# Patient Record
Sex: MALE | Race: WHITE | Employment: OTHER | URBAN - METROPOLITAN AREA
[De-identification: names, ages, dates, MRNs, and addresses within clinical notes are randomized per-mention and may not be internally consistent; named-entity substitution may affect disease eponyms.]

---

## 2020-11-22 ENCOUNTER — APPOINTMENT (OUTPATIENT)
Dept: CT IMAGING | Age: 75
DRG: 291 | End: 2020-11-22
Payer: MEDICARE

## 2020-11-22 ENCOUNTER — APPOINTMENT (OUTPATIENT)
Dept: GENERAL RADIOLOGY | Age: 75
DRG: 291 | End: 2020-11-22
Payer: MEDICARE

## 2020-11-22 ENCOUNTER — HOSPITAL ENCOUNTER (INPATIENT)
Age: 75
LOS: 8 days | Discharge: NURSING, REHAB OR LONG-TERM CARE | DRG: 291 | End: 2020-11-30
Attending: EMERGENCY MEDICINE | Admitting: INTERNAL MEDICINE
Payer: MEDICARE

## 2020-11-22 PROBLEM — Z79.4 CONTROLLED TYPE 2 DIABETES MELLITUS, WITH LONG-TERM CURRENT USE OF INSULIN (HCC): Status: ACTIVE | Noted: 2020-11-22

## 2020-11-22 PROBLEM — J18.9 PNEUMONIA: Status: ACTIVE | Noted: 2020-11-22

## 2020-11-22 PROBLEM — J96.20 ACUTE ON CHRONIC RESPIRATORY FAILURE (HCC): Status: ACTIVE | Noted: 2020-11-22

## 2020-11-22 PROBLEM — J44.1 CHRONIC OBSTRUCTIVE PULMONARY DISEASE WITH ACUTE EXACERBATION (HCC): Status: ACTIVE | Noted: 2020-11-22

## 2020-11-22 PROBLEM — I89.0 LYMPHEDEMA: Status: ACTIVE | Noted: 2020-11-22
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
## 2020-11-22 PROBLEM — N50.89 SCROTAL EDEMA: Status: ACTIVE | Noted: 2020-11-22

## 2020-11-22 PROBLEM — R09.02 HYPOXIA: Status: ACTIVE | Noted: 2020-11-22

## 2020-11-22 PROBLEM — E11.9 CONTROLLED TYPE 2 DIABETES MELLITUS, WITH LONG-TERM CURRENT USE OF INSULIN (HCC): Status: ACTIVE | Noted: 2020-11-22

## 2020-11-22 LAB
ADENOVIRUS BY PCR: NOT DETECTED
ADENOVIRUS BY PCR: NOT DETECTED
ALBUMIN SERPL-MCNC: 3.6 G/DL (ref 3.5–5.2)
ALBUMIN SERPL-MCNC: 3.6 G/DL (ref 3.5–5.2)
ALP BLD-CCNC: 77 U/L (ref 40–129)
ALP BLD-CCNC: 78 U/L (ref 40–129)
ALT SERPL-CCNC: 34 U/L (ref 0–40)
ALT SERPL-CCNC: 37 U/L (ref 0–40)
ANION GAP SERPL CALCULATED.3IONS-SCNC: 7 MMOL/L (ref 7–16)
APTT: 33 SEC (ref 24.5–35.1)
AST SERPL-CCNC: 40 U/L (ref 0–39)
AST SERPL-CCNC: 46 U/L (ref 0–39)
B.E.: 5.5 MMOL/L (ref -3–3)
BACTERIA: ABNORMAL /HPF
BASOPHILS ABSOLUTE: 0 E9/L (ref 0–0.2)
BASOPHILS ABSOLUTE: 0.03 E9/L (ref 0–0.2)
BASOPHILS RELATIVE PERCENT: 0 % (ref 0–2)
BASOPHILS RELATIVE PERCENT: 0.4 % (ref 0–2)
BILIRUB SERPL-MCNC: 0.2 MG/DL (ref 0–1.2)
BILIRUB SERPL-MCNC: 0.3 MG/DL (ref 0–1.2)
BILIRUBIN DIRECT: <0.2 MG/DL (ref 0–0.3)
BILIRUBIN URINE: ABNORMAL
BILIRUBIN, INDIRECT: ABNORMAL MG/DL (ref 0–1)
BLOOD, URINE: ABNORMAL
BORDETELLA PARAPERTUSSIS BY PCR: NOT DETECTED
BORDETELLA PARAPERTUSSIS BY PCR: NOT DETECTED
BORDETELLA PERTUSSIS BY PCR: NOT DETECTED
BORDETELLA PERTUSSIS BY PCR: NOT DETECTED
BUN BLDV-MCNC: 10 MG/DL (ref 8–23)
BUN BLDV-MCNC: 10 MG/DL (ref 8–23)
BURR CELLS: ABNORMAL
C-REACTIVE PROTEIN: 2.8 MG/DL (ref 0–0.4)
CALCIUM SERPL-MCNC: 9.2 MG/DL (ref 8.6–10.2)
CHLAMYDOPHILIA PNEUMONIAE BY PCR: NOT DETECTED
CHLAMYDOPHILIA PNEUMONIAE BY PCR: NOT DETECTED
CHLORIDE BLD-SCNC: 95 MMOL/L (ref 98–107)
CLARITY: CLEAR
CO2: 36 MMOL/L (ref 22–29)
COHB: 1.5 % (ref 0–1.5)
COLOR: YELLOW
COMMENT: ABNORMAL
CORONAVIRUS 229E BY PCR: NOT DETECTED
CORONAVIRUS 229E BY PCR: NOT DETECTED
CORONAVIRUS HKU1 BY PCR: NOT DETECTED
CORONAVIRUS HKU1 BY PCR: NOT DETECTED
CORONAVIRUS NL63 BY PCR: NOT DETECTED
CORONAVIRUS NL63 BY PCR: NOT DETECTED
CORONAVIRUS OC43 BY PCR: NOT DETECTED
CORONAVIRUS OC43 BY PCR: NOT DETECTED
CREAT SERPL-MCNC: 0.9 MG/DL (ref 0.7–1.2)
CREAT SERPL-MCNC: 0.9 MG/DL (ref 0.7–1.2)
CRITICAL: ABNORMAL
D DIMER: 315 NG/ML DDU
DATE ANALYZED: ABNORMAL
DATE OF COLLECTION: ABNORMAL
EKG ATRIAL RATE: 92 BPM
EKG P AXIS: 72 DEGREES
EKG P-R INTERVAL: 204 MS
EKG Q-T INTERVAL: 372 MS
EKG QRS DURATION: 76 MS
EKG QTC CALCULATION (BAZETT): 460 MS
EKG R AXIS: 2 DEGREES
EKG T AXIS: 66 DEGREES
EKG VENTRICULAR RATE: 92 BPM
EOSINOPHILS ABSOLUTE: 0 E9/L (ref 0.05–0.5)
EOSINOPHILS ABSOLUTE: 0.01 E9/L (ref 0.05–0.5)
EOSINOPHILS RELATIVE PERCENT: 0 % (ref 0–6)
EOSINOPHILS RELATIVE PERCENT: 0.1 % (ref 0–6)
FIBRINOGEN: 641 MG/DL (ref 225–540)
GFR AFRICAN AMERICAN: >60
GFR AFRICAN AMERICAN: >60
GFR NON-AFRICAN AMERICAN: >60 ML/MIN/1.73
GFR NON-AFRICAN AMERICAN: >60 ML/MIN/1.73
GLUCOSE BLD-MCNC: 212 MG/DL (ref 74–99)
GLUCOSE URINE: NEGATIVE MG/DL
HBA1C MFR BLD: 8.9 % (ref 4–5.6)
HCO3: 36 MMOL/L (ref 22–26)
HCT VFR BLD CALC: 42.9 % (ref 37–54)
HCT VFR BLD CALC: 47.8 % (ref 37–54)
HEMOGLOBIN: 12.5 G/DL (ref 12.5–16.5)
HEMOGLOBIN: 13.9 G/DL (ref 12.5–16.5)
HHB: 8.5 % (ref 0–5)
HUMAN METAPNEUMOVIRUS BY PCR: NOT DETECTED
HUMAN METAPNEUMOVIRUS BY PCR: NOT DETECTED
HUMAN RHINOVIRUS/ENTEROVIRUS BY PCR: NOT DETECTED
HUMAN RHINOVIRUS/ENTEROVIRUS BY PCR: NOT DETECTED
HYALINE CASTS: ABNORMAL /LPF (ref 0–2)
HYPOCHROMIA: ABNORMAL
IMMATURE GRANULOCYTES #: 0.09 E9/L
IMMATURE GRANULOCYTES %: 1.1 % (ref 0–5)
INFLUENZA A BY PCR: NOT DETECTED
INFLUENZA A BY PCR: NOT DETECTED
INFLUENZA B BY PCR: NOT DETECTED
INFLUENZA B BY PCR: NOT DETECTED
INR BLD: 1.1
KETONES, URINE: ABNORMAL MG/DL
LAB: ABNORMAL
LACTATE DEHYDROGENASE: 282 U/L (ref 135–225)
LEUKOCYTE ESTERASE, URINE: NEGATIVE
LYMPHOCYTES ABSOLUTE: 0.27 E9/L (ref 1.5–4)
LYMPHOCYTES ABSOLUTE: 0.7 E9/L (ref 1.5–4)
LYMPHOCYTES RELATIVE PERCENT: 3.5 % (ref 20–42)
LYMPHOCYTES RELATIVE PERCENT: 8.9 % (ref 20–42)
Lab: ABNORMAL
MCH RBC QN AUTO: 25.7 PG (ref 26–35)
MCH RBC QN AUTO: 25.9 PG (ref 26–35)
MCHC RBC AUTO-ENTMCNC: 29.1 % (ref 32–34.5)
MCHC RBC AUTO-ENTMCNC: 29.1 % (ref 32–34.5)
MCV RBC AUTO: 88.3 FL (ref 80–99.9)
MCV RBC AUTO: 89.2 FL (ref 80–99.9)
METER GLUCOSE: 232 MG/DL (ref 74–99)
METER GLUCOSE: 232 MG/DL (ref 74–99)
METHB: 0.3 % (ref 0–1.5)
MODE: ABNORMAL
MONOCYTES ABSOLUTE: 0 E9/L (ref 0.1–0.95)
MONOCYTES ABSOLUTE: 0.74 E9/L (ref 0.1–0.95)
MONOCYTES RELATIVE PERCENT: 0 % (ref 2–12)
MONOCYTES RELATIVE PERCENT: 9.4 % (ref 2–12)
MYCOPLASMA PNEUMONIAE BY PCR: NOT DETECTED
MYCOPLASMA PNEUMONIAE BY PCR: NOT DETECTED
NEUTROPHILS ABSOLUTE: 6.33 E9/L (ref 1.8–7.3)
NEUTROPHILS ABSOLUTE: 6.5 E9/L (ref 1.8–7.3)
NEUTROPHILS RELATIVE PERCENT: 80.1 % (ref 43–80)
NEUTROPHILS RELATIVE PERCENT: 96.5 % (ref 43–80)
NITRITE, URINE: NEGATIVE
NUCLEATED RED BLOOD CELLS: 0 /100 WBC
O2 CONTENT: 17.7 ML/DL
O2 SATURATION: 91.3 % (ref 92–98.5)
O2HB: 89.7 % (ref 94–97)
OPERATOR ID: 1190
OVALOCYTES: ABNORMAL
PARAINFLUENZA VIRUS 1 BY PCR: NOT DETECTED
PARAINFLUENZA VIRUS 1 BY PCR: NOT DETECTED
PARAINFLUENZA VIRUS 2 BY PCR: NOT DETECTED
PARAINFLUENZA VIRUS 2 BY PCR: NOT DETECTED
PARAINFLUENZA VIRUS 3 BY PCR: NOT DETECTED
PARAINFLUENZA VIRUS 3 BY PCR: NOT DETECTED
PARAINFLUENZA VIRUS 4 BY PCR: NOT DETECTED
PARAINFLUENZA VIRUS 4 BY PCR: NOT DETECTED
PATIENT TEMP: 37 C
PCO2: 85.8 MMHG (ref 35–45)
PDW BLD-RTO: 15.1 FL (ref 11.5–15)
PDW BLD-RTO: 15.1 FL (ref 11.5–15)
PH BLOOD GAS: 7.24 (ref 7.35–7.45)
PH UA: 5.5 (ref 5–9)
PLATELET # BLD: 191 E9/L (ref 130–450)
PLATELET # BLD: 224 E9/L (ref 130–450)
PMV BLD AUTO: 11.9 FL (ref 7–12)
PMV BLD AUTO: 12.5 FL (ref 7–12)
PO2: 70.4 MMHG (ref 75–100)
POIKILOCYTES: ABNORMAL
POLYCHROMASIA: ABNORMAL
POTASSIUM REFLEX MAGNESIUM: 4.6 MMOL/L (ref 3.5–5)
PRO-BNP: 1973 PG/ML (ref 0–450)
PROSTATE SPECIFIC ANTIGEN: 0.27 NG/ML (ref 0–4)
PROTEIN UA: >=300 MG/DL
PROTHROMBIN TIME: 12.1 SEC (ref 9.3–12.4)
RBC # BLD: 4.86 E12/L (ref 3.8–5.8)
RBC # BLD: 5.36 E12/L (ref 3.8–5.8)
RBC UA: ABNORMAL /HPF (ref 0–2)
RESPIRATORY SYNCYTIAL VIRUS BY PCR: NOT DETECTED
RESPIRATORY SYNCYTIAL VIRUS BY PCR: NOT DETECTED
SARS-COV-2, PCR: NOT DETECTED
SARS-COV-2, PCR: NOT DETECTED
SEDIMENTATION RATE, ERYTHROCYTE: 19 MM/HR (ref 0–15)
SODIUM BLD-SCNC: 138 MMOL/L (ref 132–146)
SOURCE, BLOOD GAS: ABNORMAL
SPECIFIC GRAVITY UA: >=1.03 (ref 1–1.03)
THB: 14 G/DL (ref 11.5–16.5)
TIME ANALYZED: 1607
TOTAL CK: 713 U/L (ref 20–200)
TOTAL PROTEIN: 7.2 G/DL (ref 6.4–8.3)
TOTAL PROTEIN: 7.4 G/DL (ref 6.4–8.3)
TROPONIN: 0.03 NG/ML (ref 0–0.03)
UROBILINOGEN, URINE: 0.2 E.U./DL
WBC # BLD: 6.7 E9/L (ref 4.5–11.5)
WBC # BLD: 7.9 E9/L (ref 4.5–11.5)
WBC UA: ABNORMAL /HPF (ref 0–5)

## 2020-11-22 PROCEDURE — 36415 COLL VENOUS BLD VENIPUNCTURE: CPT

## 2020-11-22 PROCEDURE — 94640 AIRWAY INHALATION TREATMENT: CPT

## 2020-11-22 PROCEDURE — 82550 ASSAY OF CK (CPK): CPT

## 2020-11-22 PROCEDURE — 85610 PROTHROMBIN TIME: CPT

## 2020-11-22 PROCEDURE — 6370000000 HC RX 637 (ALT 250 FOR IP): Performed by: STUDENT IN AN ORGANIZED HEALTH CARE EDUCATION/TRAINING PROGRAM

## 2020-11-22 PROCEDURE — 86140 C-REACTIVE PROTEIN: CPT

## 2020-11-22 PROCEDURE — 73521 X-RAY EXAM HIPS BI 2 VIEWS: CPT

## 2020-11-22 PROCEDURE — 70450 CT HEAD/BRAIN W/O DYE: CPT

## 2020-11-22 PROCEDURE — G0103 PSA SCREENING: HCPCS

## 2020-11-22 PROCEDURE — 71045 X-RAY EXAM CHEST 1 VIEW: CPT

## 2020-11-22 PROCEDURE — 87088 URINE BACTERIA CULTURE: CPT

## 2020-11-22 PROCEDURE — 85730 THROMBOPLASTIN TIME PARTIAL: CPT

## 2020-11-22 PROCEDURE — 85025 COMPLETE CBC W/AUTO DIFF WBC: CPT

## 2020-11-22 PROCEDURE — 93010 ELECTROCARDIOGRAM REPORT: CPT | Performed by: INTERNAL MEDICINE

## 2020-11-22 PROCEDURE — 51702 INSERT TEMP BLADDER CATH: CPT

## 2020-11-22 PROCEDURE — APPSS45 APP SPLIT SHARED TIME 31-45 MINUTES: Performed by: CLINICAL NURSE SPECIALIST

## 2020-11-22 PROCEDURE — 85384 FIBRINOGEN ACTIVITY: CPT

## 2020-11-22 PROCEDURE — 94660 CPAP INITIATION&MGMT: CPT

## 2020-11-22 PROCEDURE — 81001 URINALYSIS AUTO W/SCOPE: CPT

## 2020-11-22 PROCEDURE — 99284 EMERGENCY DEPT VISIT MOD MDM: CPT

## 2020-11-22 PROCEDURE — 6370000000 HC RX 637 (ALT 250 FOR IP): Performed by: CLINICAL NURSE SPECIALIST

## 2020-11-22 PROCEDURE — 84520 ASSAY OF UREA NITROGEN: CPT

## 2020-11-22 PROCEDURE — 94664 DEMO&/EVAL PT USE INHALER: CPT

## 2020-11-22 PROCEDURE — 84145 PROCALCITONIN (PCT): CPT

## 2020-11-22 PROCEDURE — 82805 BLOOD GASES W/O2 SATURATION: CPT

## 2020-11-22 PROCEDURE — 83880 ASSAY OF NATRIURETIC PEPTIDE: CPT

## 2020-11-22 PROCEDURE — 83036 HEMOGLOBIN GLYCOSYLATED A1C: CPT

## 2020-11-22 PROCEDURE — 87040 BLOOD CULTURE FOR BACTERIA: CPT

## 2020-11-22 PROCEDURE — 80076 HEPATIC FUNCTION PANEL: CPT

## 2020-11-22 PROCEDURE — 2580000003 HC RX 258: Performed by: STUDENT IN AN ORGANIZED HEALTH CARE EDUCATION/TRAINING PROGRAM

## 2020-11-22 PROCEDURE — 0202U NFCT DS 22 TRGT SARS-COV-2: CPT

## 2020-11-22 PROCEDURE — 99223 1ST HOSP IP/OBS HIGH 75: CPT | Performed by: INTERNAL MEDICINE

## 2020-11-22 PROCEDURE — 83520 IMMUNOASSAY QUANT NOS NONAB: CPT

## 2020-11-22 PROCEDURE — 85378 FIBRIN DEGRADE SEMIQUANT: CPT

## 2020-11-22 PROCEDURE — 83615 LACTATE (LD) (LDH) ENZYME: CPT

## 2020-11-22 PROCEDURE — 93005 ELECTROCARDIOGRAM TRACING: CPT | Performed by: STUDENT IN AN ORGANIZED HEALTH CARE EDUCATION/TRAINING PROGRAM

## 2020-11-22 PROCEDURE — 85651 RBC SED RATE NONAUTOMATED: CPT

## 2020-11-22 PROCEDURE — 2500000003 HC RX 250 WO HCPCS: Performed by: CLINICAL NURSE SPECIALIST

## 2020-11-22 PROCEDURE — 6360000002 HC RX W HCPCS: Performed by: STUDENT IN AN ORGANIZED HEALTH CARE EDUCATION/TRAINING PROGRAM

## 2020-11-22 PROCEDURE — 6360000002 HC RX W HCPCS: Performed by: CLINICAL NURSE SPECIALIST

## 2020-11-22 PROCEDURE — 80053 COMPREHEN METABOLIC PANEL: CPT

## 2020-11-22 PROCEDURE — 82565 ASSAY OF CREATININE: CPT

## 2020-11-22 PROCEDURE — 84484 ASSAY OF TROPONIN QUANT: CPT

## 2020-11-22 PROCEDURE — 2060000000 HC ICU INTERMEDIATE R&B

## 2020-11-22 PROCEDURE — 72125 CT NECK SPINE W/O DYE: CPT

## 2020-11-22 PROCEDURE — 82962 GLUCOSE BLOOD TEST: CPT

## 2020-11-22 RX ORDER — DEXTROSE MONOHYDRATE 25 G/50ML
12.5 INJECTION, SOLUTION INTRAVENOUS PRN
Status: DISCONTINUED | OUTPATIENT
Start: 2020-11-22 | End: 2020-11-30 | Stop reason: HOSPADM

## 2020-11-22 RX ORDER — DEXTROSE MONOHYDRATE 50 MG/ML
100 INJECTION, SOLUTION INTRAVENOUS PRN
Status: DISCONTINUED | OUTPATIENT
Start: 2020-11-22 | End: 2020-11-22 | Stop reason: SDUPTHER

## 2020-11-22 RX ORDER — NICOTINE POLACRILEX 4 MG
15 LOZENGE BUCCAL PRN
Status: DISCONTINUED | OUTPATIENT
Start: 2020-11-22 | End: 2020-11-22 | Stop reason: SDUPTHER

## 2020-11-22 RX ORDER — NICOTINE POLACRILEX 4 MG
15 LOZENGE BUCCAL PRN
Status: DISCONTINUED | OUTPATIENT
Start: 2020-11-22 | End: 2020-11-30 | Stop reason: HOSPADM

## 2020-11-22 RX ORDER — FLUCONAZOLE 2 MG/ML
100 INJECTION, SOLUTION INTRAVENOUS EVERY 24 HOURS
Status: DISCONTINUED | OUTPATIENT
Start: 2020-11-22 | End: 2020-11-30 | Stop reason: HOSPADM

## 2020-11-22 RX ORDER — IPRATROPIUM BROMIDE AND ALBUTEROL SULFATE 2.5; .5 MG/3ML; MG/3ML
1 SOLUTION RESPIRATORY (INHALATION)
Status: COMPLETED | OUTPATIENT
Start: 2020-11-22 | End: 2020-11-22

## 2020-11-22 RX ORDER — LOVASTATIN 40 MG/1
40 TABLET ORAL NIGHTLY
COMMUNITY

## 2020-11-22 RX ORDER — METHYLPREDNISOLONE SODIUM SUCCINATE 125 MG/2ML
125 INJECTION, POWDER, LYOPHILIZED, FOR SOLUTION INTRAMUSCULAR; INTRAVENOUS ONCE
Status: COMPLETED | OUTPATIENT
Start: 2020-11-22 | End: 2020-11-22

## 2020-11-22 RX ORDER — DONEPEZIL HYDROCHLORIDE 5 MG/1
10 TABLET, FILM COATED ORAL NIGHTLY
Status: DISCONTINUED | OUTPATIENT
Start: 2020-11-22 | End: 2020-11-30 | Stop reason: HOSPADM

## 2020-11-22 RX ORDER — ATORVASTATIN CALCIUM 10 MG/1
10 TABLET, FILM COATED ORAL DAILY
Status: DISCONTINUED | OUTPATIENT
Start: 2020-11-22 | End: 2020-11-26

## 2020-11-22 RX ORDER — BENAZEPRIL HYDROCHLORIDE 40 MG/1
40 TABLET, FILM COATED ORAL DAILY
COMMUNITY
End: 2021-01-07 | Stop reason: ALTCHOICE

## 2020-11-22 RX ORDER — AMLODIPINE BESYLATE 5 MG/1
5 TABLET ORAL DAILY
Status: DISCONTINUED | OUTPATIENT
Start: 2020-11-22 | End: 2020-11-25

## 2020-11-22 RX ORDER — DEXTROSE MONOHYDRATE 50 MG/ML
100 INJECTION, SOLUTION INTRAVENOUS PRN
Status: DISCONTINUED | OUTPATIENT
Start: 2020-11-22 | End: 2020-11-30 | Stop reason: HOSPADM

## 2020-11-22 RX ORDER — DEXAMETHASONE SODIUM PHOSPHATE 4 MG/ML
6 INJECTION, SOLUTION INTRA-ARTICULAR; INTRALESIONAL; INTRAMUSCULAR; INTRAVENOUS; SOFT TISSUE EVERY 12 HOURS
Status: DISCONTINUED | OUTPATIENT
Start: 2020-11-22 | End: 2020-11-24

## 2020-11-22 RX ORDER — ESCITALOPRAM OXALATE 10 MG/1
10 TABLET ORAL DAILY
COMMUNITY

## 2020-11-22 RX ORDER — DONEPEZIL HYDROCHLORIDE 10 MG/1
10 TABLET, FILM COATED ORAL NIGHTLY
COMMUNITY

## 2020-11-22 RX ORDER — FUROSEMIDE 10 MG/ML
20 INJECTION INTRAMUSCULAR; INTRAVENOUS ONCE
Status: COMPLETED | OUTPATIENT
Start: 2020-11-22 | End: 2020-11-22

## 2020-11-22 RX ORDER — DEXTROSE MONOHYDRATE 25 G/50ML
12.5 INJECTION, SOLUTION INTRAVENOUS PRN
Status: DISCONTINUED | OUTPATIENT
Start: 2020-11-22 | End: 2020-11-22 | Stop reason: SDUPTHER

## 2020-11-22 RX ORDER — AMLODIPINE BESYLATE 5 MG/1
5 TABLET ORAL DAILY
COMMUNITY
End: 2021-01-07 | Stop reason: ALTCHOICE

## 2020-11-22 RX ORDER — GLIMEPIRIDE 2 MG/1
2 TABLET ORAL
Status: DISCONTINUED | OUTPATIENT
Start: 2020-11-23 | End: 2020-11-22

## 2020-11-22 RX ORDER — DEXAMETHASONE SODIUM PHOSPHATE 4 MG/ML
6 INJECTION, SOLUTION INTRA-ARTICULAR; INTRALESIONAL; INTRAMUSCULAR; INTRAVENOUS; SOFT TISSUE EVERY 24 HOURS
Status: DISCONTINUED | OUTPATIENT
Start: 2020-11-23 | End: 2020-11-22

## 2020-11-22 RX ORDER — ESCITALOPRAM OXALATE 10 MG/1
10 TABLET ORAL DAILY
Status: DISCONTINUED | OUTPATIENT
Start: 2020-11-22 | End: 2020-11-30 | Stop reason: HOSPADM

## 2020-11-22 RX ORDER — GLIMEPIRIDE 2 MG/1
2 TABLET ORAL 2 TIMES DAILY
COMMUNITY

## 2020-11-22 RX ORDER — LISINOPRIL 20 MG/1
40 TABLET ORAL DAILY
Status: DISCONTINUED | OUTPATIENT
Start: 2020-11-22 | End: 2020-11-30 | Stop reason: HOSPADM

## 2020-11-22 RX ADMIN — METFORMIN HYDROCHLORIDE 500 MG: 500 TABLET ORAL at 15:29

## 2020-11-22 RX ADMIN — NYSTATIN 500000 UNITS: 100000 SUSPENSION ORAL at 15:53

## 2020-11-22 RX ADMIN — MICONAZOLE NITRATE: 20 POWDER TOPICAL at 21:29

## 2020-11-22 RX ADMIN — ESCITALOPRAM OXALATE 10 MG: 10 TABLET ORAL at 15:29

## 2020-11-22 RX ADMIN — LISINOPRIL 40 MG: 20 TABLET ORAL at 15:29

## 2020-11-22 RX ADMIN — AMLODIPINE BESYLATE 5 MG: 5 TABLET ORAL at 15:29

## 2020-11-22 RX ADMIN — METHYLPREDNISOLONE SODIUM SUCCINATE 125 MG: 125 INJECTION, POWDER, FOR SOLUTION INTRAMUSCULAR; INTRAVENOUS at 10:31

## 2020-11-22 RX ADMIN — Medication 100 MG: at 15:54

## 2020-11-22 RX ADMIN — NYSTATIN 500000 UNITS: 100000 SUSPENSION ORAL at 15:29

## 2020-11-22 RX ADMIN — IPRATROPIUM BROMIDE AND ALBUTEROL SULFATE 1 AMPULE: 2.5; .5 SOLUTION RESPIRATORY (INHALATION) at 09:01

## 2020-11-22 RX ADMIN — IPRATROPIUM BROMIDE AND ALBUTEROL SULFATE 1 AMPULE: 2.5; .5 SOLUTION RESPIRATORY (INHALATION) at 09:00

## 2020-11-22 RX ADMIN — IPRATROPIUM BROMIDE AND ALBUTEROL SULFATE 1 AMPULE: 2.5; .5 SOLUTION RESPIRATORY (INHALATION) at 09:02

## 2020-11-22 RX ADMIN — CEFTRIAXONE 2 G: 2 INJECTION, POWDER, FOR SOLUTION INTRAMUSCULAR; INTRAVENOUS at 11:12

## 2020-11-22 RX ADMIN — ATORVASTATIN CALCIUM 10 MG: 10 TABLET, FILM COATED ORAL at 15:29

## 2020-11-22 RX ADMIN — FUROSEMIDE 20 MG: 10 INJECTION, SOLUTION INTRAVENOUS at 15:53

## 2020-11-22 RX ADMIN — MICONAZOLE NITRATE: 20 POWDER TOPICAL at 15:29

## 2020-11-22 RX ADMIN — AZITHROMYCIN MONOHYDRATE 500 MG: 500 INJECTION, POWDER, LYOPHILIZED, FOR SOLUTION INTRAVENOUS at 11:16

## 2020-11-22 ASSESSMENT — PAIN SCALES - GENERAL
PAINLEVEL_OUTOF10: 0
PAINLEVEL_OUTOF10: 0

## 2020-11-22 ASSESSMENT — PAIN SCALES - PAIN ASSESSMENT IN ADVANCED DEMENTIA (PAINAD)
FACIALEXPRESSION: 0
NEGVOCALIZATION: 0
BREATHING: 0
TOTALSCORE: 0
CONSOLABILITY: 0
BODYLANGUAGE: 0

## 2020-11-22 NOTE — PROGRESS NOTES
Imaging reviewed. Repeat respiratory panel and CT chest wo contrast.    Continue Azithromycin and Ceftriaxone. We will await further lab work and imaging. Case discussed with Dr. Lance Cruz.     Rowe Habermann  11/22/2020  6:13 PM

## 2020-11-22 NOTE — ED PROVIDER NOTES
The patient is a 73-year-old male with a history of COPD chronically on 3 to 4 L nasal cannula oxygen, lymphedema, chronic wounds of his bilateral lower extremities, dementia, and diabetes who presents to the emergency department via EMS after experiencing a fall at home. Patient is a poor historian, but he states that he was sitting in bed watching television today when he slipped out and fell. He states that he laid on the ground for approximately 1 hour before his wife called EMS. The patient denies any pain or injuries at this time. He denies any fever, chills, headache, chest pain, shortness of breath, abdominal pain, lightheadedness, dizziness, syncope, recent hospitalization, recent illness, or other acute symptoms or concerns. Patient states that he does live at home with his wife and typically walks on his own, but he has been experiencing increased weakness and has been unable to ambulate. He states that he does not have a walker at home or use any other assistance tools to walk typically. The history is provided by the patient. Review of Systems   Unable to perform ROS: Dementia   Constitutional: Positive for fatigue. Neurological: Positive for weakness. All other systems reviewed and are negative. Physical Exam  Vitals signs and nursing note reviewed. Constitutional:       General: He is not in acute distress. Appearance: He is well-developed. He is morbidly obese. He is ill-appearing (Currently ill-appearing but no acute distress). He is not toxic-appearing or diaphoretic. Interventions: Nasal cannula in place. Comments: No conversational dyspnea. Audible wheezing. HENT:      Head: Normocephalic and atraumatic. Nose: Nose normal.      Mouth/Throat:      Lips: Pink. No lesions. Mouth: Mucous membranes are moist.   Eyes:      General: Lids are normal.   Neck:      Musculoskeletal: Normal range of motion and neck supple.    Cardiovascular: Rate and Rhythm: Normal rate and regular rhythm. Heart sounds: Normal heart sounds, S1 normal and S2 normal. No murmur. Comments: Pitting edema of the bilateral lower extremities with chronic wounds and vascular venous stasis changes present bilaterally. There is a small amount of bleeding from the left anterior tibial region. Pulmonary:      Effort: Pulmonary effort is normal. No tachypnea or respiratory distress. Breath sounds: Decreased breath sounds (Diminished breath sounds at the bilateral bases), wheezing (Inspiratory and expiratory wheezing bilaterally) and rhonchi (Scattered coarse breath sounds throughout) present. No rales. Abdominal:      General: Abdomen is protuberant. Bowel sounds are normal.      Palpations: Abdomen is soft. Tenderness: There is no abdominal tenderness. There is no guarding or rebound. Genitourinary:     Comments: Scrotal edema  Musculoskeletal:      Right lower le+ Edema present. Left lower le+ Edema present. Skin:     General: Skin is warm and dry. Neurological:      Mental Status: He is alert and oriented to person, place, and time. Motor: No tremor or abnormal muscle tone. Coordination: Coordination normal.          Procedures     MDM  Number of Diagnoses or Management Options  Elevated brain natriuretic peptide (BNP) level:   Elevated CK: Fall, initial encounter:   Hyperglycemia:   Pneumonia due to organism:   Scrotal edema:   Unable to ambulate:   Diagnosis management comments: The patient is a 73-year-old male who presents to the emergency department complaining of a fall. He is hemodynamically stable, nontoxic, chronically ill in appearance, audible wheezing, but in no acute distress.   Urinalysis shows a large amount of blood but no evidence of acute infection, no leukocytosis, no anemia, no electrolyte abnormalities, glucose elevated at 212, AST slightly elevated at 40, CK was elevated at 713, no evidence of acute kidney injury, BNP 1973, troponin 0.03, CT head did not show acute intracranial hemorrhage or other abnormalities, CT cervical spine did not show any acute abnormalities, and chest x-ray shows multifocal bilateral patchy pulmonary infiltrates. X-ray of the bilateral hips and pelvis did not show any fracture or dislocation. Treated patient with a dose of Solu-Medrol and duo nebs due to the audible wheezing initially. Blood cultures were sent and the patient was also treated with empiric antibiotics including azithromycin and Rocephin to cover for community-acquired pneumonia. X-ray also consistent with patchy bilateral infiltrates which may be secondary to Covid. Respiratory panel was sent and is pending. Consulted with hospitalist who accepted the patient for admission. Discussed results and plan of care with patient he verbalized understanding and agreement to treatment plan and admission. ED Course as of Nov 22 1402   Sun Nov 22, 2020   1025 Consulted with Dr. Jah Mcknight, hospitalist, who accepted the patient for admission. [KG]      ED Course User Index  [KG] Dione Bass DO        EKG: This EKG is signed and interpreted by me. Rate: 92  Rhythm: Sinus  Interpretation: Normal sinus rhythm, normal axis, nonspecific ST changes anterior septal leads, intervals within normal limits, QTC is 460  Comparison: no previous EKG available     ED Course as of Nov 22 1402   Sun Nov 22, 2020 1025 Consulted with Dr. Jah Mcknight, hospitalist, who accepted the patient for admission. [KG]      ED Course User Index  [KG] Dione Bass DO       --------------------------------------------- PAST HISTORY ---------------------------------------------  Past Medical History:  has a past medical history of COPD (chronic obstructive pulmonary disease) ([facility] [address] Dementia ([facility] and Diabetes mellitus ([facility] Past Surgical History:  has no past surgical history on file.     Social History:  reports that he has quit smoking. He has never used smokeless tobacco. He reports previous alcohol use. He reports that he does not use drugs. Family History: family history is not on file. The patients home medications have been reviewed. Allergies: Patient has no known allergies.     -------------------------------------------------- RESULTS -------------------------------------------------    LABS:  Results for orders placed or performed during the hospital encounter of 11/22/20   CBC auto differential   Result Value Ref Range    WBC 7.9 4.5 - 11.5 E9/L    RBC 4.86 3.80 - 5.80 E12/L    Hemoglobin 12.5 12.5 - 16.5 g/dL    Hematocrit 42.9 37.0 - 54.0 %    MCV 88.3 80.0 - 99.9 fL    MCH 25.7 (L) 26.0 - 35.0 pg    MCHC 29.1 (L) 32.0 - 34.5 %    RDW 15.1 (H) 11.5 - 15.0 fL    Platelets 725 951 - 483 E9/L    MPV 11.9 7.0 - 12.0 fL    Neutrophils % 80.1 (H) 43.0 - 80.0 %    Immature Granulocytes % 1.1 0.0 - 5.0 %    Lymphocytes % 8.9 (L) 20.0 - 42.0 %    Monocytes % 9.4 2.0 - 12.0 %    Eosinophils % 0.1 0.0 - 6.0 %    Basophils % 0.4 0.0 - 2.0 %    Neutrophils Absolute 6.33 1.80 - 7.30 E9/L    Immature Granulocytes # 0.09 E9/L    Lymphocytes Absolute 0.70 (L) 1.50 - 4.00 E9/L    Monocytes Absolute 0.74 0.10 - 0.95 E9/L    Eosinophils Absolute 0.01 (L) 0.05 - 0.50 E9/L    Basophils Absolute 0.03 0.00 - 0.20 E9/L   Comprehensive Metabolic Panel w/ Reflex to MG   Result Value Ref Range    Sodium 138 132 - 146 mmol/L    Potassium reflex Magnesium 4.6 3.5 - 5.0 mmol/L    Chloride 95 (L) 98 - 107 mmol/L    CO2 36 (H) 22 - 29 mmol/L    Anion Gap 7 7 - 16 mmol/L    Glucose 212 (H) 74 - 99 mg/dL    BUN 10 8 - 23 mg/dL    CREATININE 0.9 0.7 - 1.2 mg/dL    GFR Non-African American >60 >=60 mL/min/1.73    GFR African American >60     Calcium 9.2 8.6 - 10.2 mg/dL    Total Protein 7.2 6.4 - 8.3 g/dL    Alb 3.6 3.5 - 5.2 g/dL    Total Bilirubin 0.3 0.0 - 1.2 mg/dL    Alkaline Phosphatase 77 40 - 129 U/L    ALT 34 0 - 40 U/L    AST 40 (H) 0 - 39 U/L   CK   Result Value Ref Range    Total  (H) 20 - 200 U/L   Brain Natriuretic Peptide   Result Value Ref Range    Pro-BNP 1,973 (H) 0 - 450 pg/mL   Troponin   Result Value Ref Range    Troponin 0.03 0.00 - 0.03 ng/mL   Urinalysis with Microscopic   Result Value Ref Range    Color, UA Yellow Straw/Yellow    Clarity, UA Clear Clear    Glucose, Ur Negative Negative mg/dL    Bilirubin Urine SMALL (A) Negative    Ketones, Urine TRACE (A) Negative mg/dL    Specific Gravity, UA >=1.030 1.005 - 1.030    Blood, Urine LARGE (A) Negative    pH, UA 5.5 5.0 - 9.0    Protein, UA >=300 (A) Negative mg/dL    Urobilinogen, Urine 0.2 <2.0 E.U./dL    Nitrite, Urine Negative Negative    Leukocyte Esterase, Urine Negative Negative    Hyaline Casts, UA 0-2 0 - 2 /LPF    WBC, UA 0-1 0 - 5 /HPF    RBC, UA 1-3 0 - 2 /HPF    Bacteria, UA RARE (A) None Seen /HPF   EKG 12 Lead   Result Value Ref Range    Ventricular Rate 92 BPM    Atrial Rate 92 BPM    P-R Interval 204 ms    QRS Duration 76 ms    Q-T Interval 372 ms    QTc Calculation (Bazett) 460 ms    P Axis 72 degrees    R Axis 2 degrees    T Axis 66 degrees       RADIOLOGY:  CT HEAD WO CONTRAST   Final Result   1. There is no acute intracranial abnormality. Specifically, there is no   intracranial hemorrhage. 2. Atrophy and periventricular leukomalacia,      CT CERVICAL SPINE WO CONTRAST   Final Result   No acute osseous abnormality of the cervical spine. XR CHEST PORTABLE   Final Result   Multifocal bilateral patchy pulmonary infiltrates. XR HIP BILATERAL W AP PELVIS (2 VIEWS)   Final Result   1. There is no right or left hip fracture dislocation   2. Mild degenerative changes of the hips.        ------------------------- NURSING NOTES AND VITALS REVIEWED ---------------------------  Date / Time Roomed:  11/22/2020  8:26 AM  ED Bed Assignment:  [id number]    The nursing notes within the ED encounter and vital signs as below have been reviewed.      Patient

## 2020-11-22 NOTE — ED NOTES
Bed: 20  Expected date:   Expected time:   Means of arrival:   Comments:  ems     Keith Nunn RN  11/22/20 [id number]

## 2020-11-22 NOTE — PROGRESS NOTES
Date: 11/22/2020    Time: 4:28 PM    Patient Placed On BIPAP/CPAP/ Non-Invasive Ventilation? Yes    If no must comment. Facial area red/color change? No           If YES are Blister/Lesion present? No   If yes must notify nursing staff  BIPAP/CPAP skin barrier?   Yes    Skin barrier type:mepilexlite       Comments:        Kar Colbert          11/22/20 6547   NIV Type   $NIV $Daily Charge   Skin Assessment Clean, dry, & intact   Skin Protection for O2 Device Yes   NIV Started/Stopped On   Equipment Type V60   Mode Bilevel   Mask Type Full face mask   Mask Size Large   Settings/Measurements   IPAP 18 cmH20   CPAP/EPAP 8 cmH2O   Rate Ordered 16   Resp 16   FiO2  50 %   Vt Exhaled 600 mL   Minute Volume 9.8 Liters   Mask Leak (lpm) 53 lpm   Comfort Level Good   Using Accessory Muscles No   SpO2 95

## 2020-11-22 NOTE — H&P
[facility] History and Physical      CHIEF COMPLAINT: Fall at home, unresponsiveness, ongoing weakness shortness of breath in the setting of COPD. History of Present Illness: This is a 52-year-old  man with a past medical history of COPD, venous stasis, lymphedema, chronic wounds, dementia, morbid obesity, chronic hypoxia, history of alcohol and nicotine use, JACEK. Admitted in from the ER today coming in via EMS services apparently the patient was at home resting in the chair watching TV he had been found by his wife on the floor after a fall I am unclear if he actually lost consciousness or fell asleep obtunded. He is got a history of COPD that is oxygen dependent about 3 L follows up at local pulmonary's. As I examined the patient in the ER he is obtunded mainly awake for just a few seconds and falling back asleep. I spoke with the ER provider and the nurse about the above-mentioned story. I did place 2 calls out to the wife but did not make contact just yet. Upon arrival to the ER as above, CT the spine and CT the head unremarkable for acute findings bilateral hip x-rays unremarkable for acute pathology does show degenerative changes. Chest x-ray concerning for bilateral lower infiltrates and groundglass. Patient had blood culture sent and a COVID-19 array sent. BNP shows 1972 CK total 713. He was given azithromycin, ceftriaxone, Solu-Medrol 125 mg and a DuoNeb treatment. He is currently on 6 L nasal cannula oxygen.    -spoke to wife about him over the last few weeks to months -she brought me up to speed on some of the listed comorbidities he had below and the issues brought in today when she had to call 911. She gave the ER nurse the bed list and they are trying to get it corrected at this time.     Informant(s) for H&P: Medical chart, RN present    REVIEW OF SYSTEMS:  A comprehensive review of systems was negative except for: what is in the HPI  -An otherwise very limited review of systems due to the patient's obtunded this and sleepiness    PMH:  Past Medical History:   Diagnosis Date    COPD (chronic obstructive pulmonary disease) ([facility])     Dementia ([facility] [address] [facility]     Diabetes mellitus ([facility]       · Dementia  · Diabetes type 2  · COPD  · History of alcohol tobacco use  · JACEK  · Lymphedema  · Stasis  · Left lower leg wounds    Surgical History:  History reviewed. No pertinent surgical history. · Lower extremity wound debridement    Medications Prior to Admission:    Prior to Admission medications    Not on File       Allergies:    Patient has no known allergies. Social History:    reports that he has quit smoking. He has never used smokeless tobacco. He reports previous alcohol use. He reports that he does not use drugs. Patient is  with 2 children he is a former smoker quit many years ago he only smoked for about 10 years according to his wife. Former alcohol use but quit many years ago during a stressful job he is retired from he was still and retired from [employer] as a supervisor. Family History:   family history is not on file. Mother- around age [de-identified] complications of diabetes  Father- around age [de-identified] with pneumonia and sepsis      PHYSICAL EXAM:  Vitals:  BP (!) 145/92   Pulse 85   Temp 96.7 °F (35.9 °C) (Axillary)   Resp 20   Ht 5' 11\" (1.803 m)   Wt (!) 318 lb 6 oz (144.4 kg)   SpO2 96%   BMI 44.40 kg/m²     General Appearance: Obtunded, awakes briefly for tactile stimulation verbal stimulation then falls right back to sleep. Skin: warm and dry  -Excoriated perigluteal folds abdominal folds.   Cutaneous candidiasis  Head: normocephalic and atraumatic  Eyes: pupils equal, round, and reactive to light, extraocular eye movements intact, conjunctivae normal  Neck: neck supple and non tender without mass   Pulmonary/Chest: clear to auscultation bilaterally- no wheezes, rales or rhonchi, normal air movement, no respiratory distress. 2 L nasal cannula with poor air exchange  Cardiovascular: normal rate, normal S1 and S2 and no carotid bruits  Abdomen: Distended, normal bowel sounds, no masses or organomegaly  -Penis and scrotum 4+ edema with Simms catheter concentrated yellow urine. Penis and scrotum with erythema  Extremities: no cyanosis, no clubbing 4+ lymphedema bilateral lower extremities   -Left second toe amputated. Lower extremities with scattered venous stasis discoloration, scattered superficial venous stasis ulcerations with cracked fissured skin and weeping  -Right second toe metatarsal head 2 cm circular ulceration. Venous stasis discoloration along with fissured venous stasis ulcerations throughout the lower remedy. Neurologic: no cranial nerve deficit and speech normal  Hep-Lock      LABS:  Recent Labs     20  0904      K 4.6   CL 95*   CO2 36*   BUN 10   CREATININE 0.9   GLUCOSE 212*   CALCIUM 9.2       Recent Labs     20  0904   WBC 7.9   RBC 4.86   HGB 12.5   HCT 42.9   MCV 88.3   MCH 25.7*   MCHC 29.1*   RDW 15.1*      MPV 11.9       No results for input(s): POCGLU in the last 72 hours. CK total 713  BNP 1973  Blood culture sent  COVID-19 PCR sent    Radiology:   CT HEAD WO CONTRAST   Final Result   1. There is no acute intracranial abnormality. Specifically, there is no   intracranial hemorrhage. 2. Atrophy and periventricular leukomalacia,      CT CERVICAL SPINE WO CONTRAST   Final Result   No acute osseous abnormality of the cervical spine. XR CHEST PORTABLE   Final Result   Multifocal bilateral patchy pulmonary infiltrates. XR HIP BILATERAL W AP PELVIS (2 VIEWS)   Final Result   1. There is no right or left hip fracture dislocation   2. Mild degenerative changes of the hips.           EK2020 10:03 AM - Miles, Mhy Incoming Ekg Results From [facility]     Component  Value  Ref Range & Units  Status  Collected  Lab    Ventricular Rate  92  BPM  Incomplete 11/22/2020  9:28 AM  [facility]    Atrial Rate  92  BPM  Incomplete  11/22/2020  9:28 AM  [facility]    P-R Interval  204  ms  Incomplete  11/22/2020  9:28 AM  [facility]    QRS Duration  76  ms  Incomplete  11/22/2020  9:28 AM  [facility]    Q-T Interval  372  ms  Incomplete  11/22/2020  9:28 AM  [facility]    QTc Calculation (Bazett)  460  ms  Incomplete  11/22/2020  9:28 AM  [facility]    P Axis  72  degrees  Incomplete  11/22/2020  9:28 AM  [facility]    R Axis  2  degrees  Incomplete  11/22/2020  9:28 AM  [facility]    T Axis  66  degrees  Incomplete  11/22/2020  9:28 AM  [facility]    Testing Performed By     Lab - Abbreviation  Name  Director  Address  Valid Date Range    360-[facility]  [facility]  Unknown  Unknown  04/18/16 0721-Present    Narrative & Impression     Sinus rhythm with premature atrial complexes  Septal infarct , age undetermined  Abnormal ECG  No previous ECGs available         ASSESSMENT:      Active Problems:    Pneumonia    Chronic obstructive pulmonary disease with acute exacerbation (HCC)    Controlled type 2 diabetes mellitus, with long-term current use of insulin (HCC)    Hypoxia    Scrotal edema    Lymphedema    Acute on chronic respiratory failure ([facility])  Resolved Problems:    * No resolved hospital problems. *      PLAN:    1. Acute on chronic hypoxic respiratory failure -history of COPD  -Comes in EMS services with questionable loss of consciousness, fall on the floor while watching TV that was reported.   -Chronic hypoxia  -Differential included COVID-19, community-acquired pneumonia, aspiration pneumonia, viral pneumonitis, fluid volume overload, COPD exacerbation      -Chest x-ray with groundglass and bilateral infiltrates  -Already received azithromycin and ceftriaxone  -Received IV steroids  -DuoNeb  -COVID-19 protocol labs sent  -Consult pulmonary          Fall -apparently was at home sitting in the chair watching TV he had fallen to sleep was noted to been found by his wife on the floor questionable loss of consciousness versus up tenderness and fall. Elevated CK total 713 secondary to above  -          COPD with probable COPD exacerbation  -Consult pulmonary  -ABGs  -Continue IV steroids        Concern for community-acquired pneumonia versus viral pneumonia  -X-ray with multifocal bilateral patchy pulmonary infiltrates with patchy groundglass infiltrates. -ceftriaxone and azithromycin in the ER  -Placed on 3 to 6 L nasal cannula  -      Bilateral lower extremity venous stasis, lymphedema and chronic wounds  -Epic chart shows he has seen local podiatry Dr. Yasmine Howard  -Consult wound care      Fluid volume overload  -Patient BNP 1973  -I will give a one-time dose Lasix for now        Dementia - ongoing 7 + yrs  Had formal neuro opt per wife  -Continue Aricept          Morbid obesity  - diet and liefestyle      Diabetes type 2  Continue home regimen of Metformin and glimepiride      Scrotal edema  -Concerning for scrotal cellulitis, severe lymphedema  -Catheter was placed  -Send UA, cultures        Edwar, gluteal, abdominal excoriation and candidiasis  -Supportive care, nystatin powder  -Start Diflucan            Likely  underlying urine retention   Concern for urinary tract infection  -Send UA, CS        HTN  - home reg Norvasc, lisinopril        Hyperlipidemia  -Home statin        Spoke to wife Nolvia Blum on issues as above best I could   -COVID-19 labs discussed with attending physician here on the admitting team  -Admission med reconciliation with no medications I have the floor call me once ER places         Code Status: Full code---wife wants    DVT prophylaxis: Start Lovenox  Diet low sod, diabatic   Will need SW for placement           NOTE: This report was transcribed using voice recognition software. Every effort was made to ensure accuracy; however, inadvertent computerized transcription errors may be present.   Electronically signed by IKE Carrasco on 11/22/2020 at 12:44 PM

## 2020-11-22 NOTE — PLAN OF CARE
Problem: Falls - Risk of:  Goal: Will remain free from falls  Description: Will remain free from falls  Outcome: Met This Shift     Problem: Skin Integrity:  Goal: Will show no infection signs and symptoms  Description: Will show no infection signs and symptoms  Outcome: Met This Shift     Problem: Injury - Risk of, Physical Injury:  Goal: Will remain free from falls  Description: Will remain free from falls  Outcome: Met This Shift     Problem: Cardiac:  Goal: Hemodynamic stability will improve  Description: Hemodynamic stability will improve  Outcome: Met This Shift     Problem: Airway Clearance - Ineffective:  Goal: Ability to maintain a clear airway will improve  Description: Ability to maintain a clear airway will improve  Outcome: Met This Shift     Problem: Confusion - Acute:  Goal: Absence of continued neurological deterioration signs and symptoms  Description: Absence of continued neurological deterioration signs and symptoms  Outcome: Ongoing

## 2020-11-23 ENCOUNTER — APPOINTMENT (OUTPATIENT)
Dept: CT IMAGING | Age: 75
DRG: 291 | End: 2020-11-23
Payer: MEDICARE

## 2020-11-23 LAB
ALBUMIN SERPL-MCNC: 3.2 G/DL (ref 3.5–5.2)
ALP BLD-CCNC: 68 U/L (ref 40–129)
ALT SERPL-CCNC: 22 U/L (ref 0–40)
ANION GAP SERPL CALCULATED.3IONS-SCNC: 7 MMOL/L (ref 7–16)
AST SERPL-CCNC: 28 U/L (ref 0–39)
BASOPHILIC STIPPLING: ABNORMAL
BASOPHILS ABSOLUTE: 0 E9/L (ref 0–0.2)
BASOPHILS RELATIVE PERCENT: 0 % (ref 0–2)
BILIRUB SERPL-MCNC: 0.3 MG/DL (ref 0–1.2)
BUN BLDV-MCNC: 13 MG/DL (ref 8–23)
CALCIUM SERPL-MCNC: 8.6 MG/DL (ref 8.6–10.2)
CHLORIDE BLD-SCNC: 94 MMOL/L (ref 98–107)
CK MB: 16 NG/ML (ref 0–7.7)
CK MB: 19.6 NG/ML (ref 0–7.7)
CO2: 38 MMOL/L (ref 22–29)
CREAT SERPL-MCNC: 0.9 MG/DL (ref 0.7–1.2)
EOSINOPHILS ABSOLUTE: 0.01 E9/L (ref 0.05–0.5)
EOSINOPHILS RELATIVE PERCENT: 0.2 % (ref 0–6)
GFR AFRICAN AMERICAN: >60
GFR NON-AFRICAN AMERICAN: >60 ML/MIN/1.73
GLUCOSE BLD-MCNC: 270 MG/DL (ref 74–99)
HBA1C MFR BLD: 8.9 % (ref 4–5.6)
HCT VFR BLD CALC: 41.1 % (ref 37–54)
HEMOGLOBIN: 11.8 G/DL (ref 12.5–16.5)
IMMATURE GRANULOCYTES #: 0.05 E9/L
IMMATURE GRANULOCYTES %: 1 % (ref 0–5)
LYMPHOCYTES ABSOLUTE: 0.33 E9/L (ref 1.5–4)
LYMPHOCYTES RELATIVE PERCENT: 6.5 % (ref 20–42)
MAGNESIUM: 2.1 MG/DL (ref 1.6–2.6)
MCH RBC QN AUTO: 25.5 PG (ref 26–35)
MCHC RBC AUTO-ENTMCNC: 28.7 % (ref 32–34.5)
MCV RBC AUTO: 88.8 FL (ref 80–99.9)
METER GLUCOSE: 223 MG/DL (ref 74–99)
METER GLUCOSE: 241 MG/DL (ref 74–99)
METER GLUCOSE: 246 MG/DL (ref 74–99)
MONOCYTES ABSOLUTE: 0.13 E9/L (ref 0.1–0.95)
MONOCYTES RELATIVE PERCENT: 2.6 % (ref 2–12)
NEUTROPHILS ABSOLUTE: 4.56 E9/L (ref 1.8–7.3)
NEUTROPHILS RELATIVE PERCENT: 89.7 % (ref 43–80)
PDW BLD-RTO: 15.1 FL (ref 11.5–15)
PHOSPHORUS: 5.4 MG/DL (ref 2.5–4.5)
PLATELET # BLD: 204 E9/L (ref 130–450)
PMV BLD AUTO: 11.9 FL (ref 7–12)
POTASSIUM SERPL-SCNC: 5 MMOL/L (ref 3.5–5)
POTASSIUM SERPL-SCNC: 5.3 MMOL/L (ref 3.5–5)
RBC # BLD: 4.63 E12/L (ref 3.8–5.8)
RBC # BLD: NORMAL 10*6/UL
SODIUM BLD-SCNC: 139 MMOL/L (ref 132–146)
TOTAL CK: 428 U/L (ref 20–200)
TOTAL CK: 484 U/L (ref 20–200)
TOTAL PROTEIN: 6.4 G/DL (ref 6.4–8.3)
TROPONIN: 0.02 NG/ML (ref 0–0.03)
TROPONIN: 0.02 NG/ML (ref 0–0.03)
WBC # BLD: 5.1 E9/L (ref 4.5–11.5)

## 2020-11-23 PROCEDURE — 87186 SC STD MICRODIL/AGAR DIL: CPT

## 2020-11-23 PROCEDURE — 6360000002 HC RX W HCPCS: Performed by: CLINICAL NURSE SPECIALIST

## 2020-11-23 PROCEDURE — 71275 CT ANGIOGRAPHY CHEST: CPT

## 2020-11-23 PROCEDURE — 82550 ASSAY OF CK (CPK): CPT

## 2020-11-23 PROCEDURE — 2700000000 HC OXYGEN THERAPY PER DAY

## 2020-11-23 PROCEDURE — 94660 CPAP INITIATION&MGMT: CPT

## 2020-11-23 PROCEDURE — 83036 HEMOGLOBIN GLYCOSYLATED A1C: CPT

## 2020-11-23 PROCEDURE — 84100 ASSAY OF PHOSPHORUS: CPT

## 2020-11-23 PROCEDURE — 85025 COMPLETE CBC W/AUTO DIFF WBC: CPT

## 2020-11-23 PROCEDURE — 6370000000 HC RX 637 (ALT 250 FOR IP): Performed by: INTERNAL MEDICINE

## 2020-11-23 PROCEDURE — 6370000000 HC RX 637 (ALT 250 FOR IP): Performed by: CLINICAL NURSE SPECIALIST

## 2020-11-23 PROCEDURE — 6360000002 HC RX W HCPCS: Performed by: INTERNAL MEDICINE

## 2020-11-23 PROCEDURE — APPSS30 APP SPLIT SHARED TIME 16-30 MINUTES: Performed by: NURSE PRACTITIONER

## 2020-11-23 PROCEDURE — 87070 CULTURE OTHR SPECIMN AEROBIC: CPT

## 2020-11-23 PROCEDURE — 84145 PROCALCITONIN (PCT): CPT

## 2020-11-23 PROCEDURE — 6360000004 HC RX CONTRAST MEDICATION: Performed by: RADIOLOGY

## 2020-11-23 PROCEDURE — 36415 COLL VENOUS BLD VENIPUNCTURE: CPT

## 2020-11-23 PROCEDURE — 82553 CREATINE MB FRACTION: CPT

## 2020-11-23 PROCEDURE — 99232 SBSQ HOSP IP/OBS MODERATE 35: CPT | Performed by: INTERNAL MEDICINE

## 2020-11-23 PROCEDURE — 2580000003 HC RX 258: Performed by: INTERNAL MEDICINE

## 2020-11-23 PROCEDURE — 2580000003 HC RX 258

## 2020-11-23 PROCEDURE — 80053 COMPREHEN METABOLIC PANEL: CPT

## 2020-11-23 PROCEDURE — 84132 ASSAY OF SERUM POTASSIUM: CPT

## 2020-11-23 PROCEDURE — 94640 AIRWAY INHALATION TREATMENT: CPT

## 2020-11-23 PROCEDURE — 84484 ASSAY OF TROPONIN QUANT: CPT

## 2020-11-23 PROCEDURE — 82962 GLUCOSE BLOOD TEST: CPT

## 2020-11-23 PROCEDURE — 2060000000 HC ICU INTERMEDIATE R&B

## 2020-11-23 PROCEDURE — 2500000003 HC RX 250 WO HCPCS: Performed by: CLINICAL NURSE SPECIALIST

## 2020-11-23 PROCEDURE — 83735 ASSAY OF MAGNESIUM: CPT

## 2020-11-23 RX ORDER — ARFORMOTEROL TARTRATE 15 UG/2ML
15 SOLUTION RESPIRATORY (INHALATION) 2 TIMES DAILY
Status: DISCONTINUED | OUTPATIENT
Start: 2020-11-23 | End: 2020-11-30 | Stop reason: HOSPADM

## 2020-11-23 RX ORDER — SODIUM CHLORIDE 0.9 % (FLUSH) 0.9 %
SYRINGE (ML) INJECTION
Status: COMPLETED
Start: 2020-11-23 | End: 2020-11-23

## 2020-11-23 RX ORDER — SODIUM CHLORIDE 0.9 % (FLUSH) 0.9 %
10 SYRINGE (ML) INJECTION 2 TIMES DAILY
Status: DISCONTINUED | OUTPATIENT
Start: 2020-11-23 | End: 2020-11-30 | Stop reason: HOSPADM

## 2020-11-23 RX ORDER — FLUTICASONE FUROATE AND VILANTEROL 100; 25 UG/1; UG/1
1 POWDER RESPIRATORY (INHALATION) DAILY
Status: DISCONTINUED | OUTPATIENT
Start: 2020-11-23 | End: 2020-11-23

## 2020-11-23 RX ORDER — SODIUM CHLORIDE 0.9 % (FLUSH) 0.9 %
10 SYRINGE (ML) INJECTION PRN
Status: DISCONTINUED | OUTPATIENT
Start: 2020-11-23 | End: 2020-11-30 | Stop reason: HOSPADM

## 2020-11-23 RX ORDER — BUDESONIDE 0.25 MG/2ML
250 INHALANT ORAL 2 TIMES DAILY
Status: DISCONTINUED | OUTPATIENT
Start: 2020-11-23 | End: 2020-11-30 | Stop reason: HOSPADM

## 2020-11-23 RX ORDER — FUROSEMIDE 10 MG/ML
20 INJECTION INTRAMUSCULAR; INTRAVENOUS DAILY
Status: DISCONTINUED | OUTPATIENT
Start: 2020-11-23 | End: 2020-11-26

## 2020-11-23 RX ADMIN — ESCITALOPRAM OXALATE 10 MG: 10 TABLET ORAL at 10:32

## 2020-11-23 RX ADMIN — DEXAMETHASONE SODIUM PHOSPHATE 6 MG: 4 INJECTION, SOLUTION INTRAMUSCULAR; INTRAVENOUS at 10:29

## 2020-11-23 RX ADMIN — INSULIN LISPRO 2 UNITS: 100 INJECTION, SOLUTION INTRAVENOUS; SUBCUTANEOUS at 17:07

## 2020-11-23 RX ADMIN — DONEPEZIL HYDROCHLORIDE 10 MG: 5 TABLET, FILM COATED ORAL at 21:24

## 2020-11-23 RX ADMIN — SODIUM CHLORIDE, PRESERVATIVE FREE 10 ML: 5 INJECTION INTRAVENOUS at 17:02

## 2020-11-23 RX ADMIN — AZITHROMYCIN 500 MG: 500 INJECTION, POWDER, LYOPHILIZED, FOR SOLUTION INTRAVENOUS at 10:28

## 2020-11-23 RX ADMIN — MICONAZOLE NITRATE: 20 POWDER TOPICAL at 21:30

## 2020-11-23 RX ADMIN — IOPAMIDOL 100 ML: 755 INJECTION, SOLUTION INTRAVENOUS at 14:04

## 2020-11-23 RX ADMIN — CEFTRIAXONE 1 G: 1 INJECTION, POWDER, FOR SOLUTION INTRAMUSCULAR; INTRAVENOUS at 10:27

## 2020-11-23 RX ADMIN — NYSTATIN 500000 UNITS: 100000 SUSPENSION ORAL at 21:24

## 2020-11-23 RX ADMIN — NYSTATIN 500000 UNITS: 100000 SUSPENSION ORAL at 17:02

## 2020-11-23 RX ADMIN — DEXAMETHASONE SODIUM PHOSPHATE 6 MG: 4 INJECTION, SOLUTION INTRAMUSCULAR; INTRAVENOUS at 00:08

## 2020-11-23 RX ADMIN — AMLODIPINE BESYLATE 5 MG: 5 TABLET ORAL at 10:32

## 2020-11-23 RX ADMIN — NYSTATIN 500000 UNITS: 100000 SUSPENSION ORAL at 10:32

## 2020-11-23 RX ADMIN — LISINOPRIL 40 MG: 20 TABLET ORAL at 10:32

## 2020-11-23 RX ADMIN — FUROSEMIDE 20 MG: 10 INJECTION, SOLUTION INTRAMUSCULAR; INTRAVENOUS at 17:01

## 2020-11-23 RX ADMIN — SODIUM CHLORIDE, PRESERVATIVE FREE 10 ML: 5 INJECTION INTRAVENOUS at 10:27

## 2020-11-23 RX ADMIN — INSULIN LISPRO 2 UNITS: 100 INJECTION, SOLUTION INTRAVENOUS; SUBCUTANEOUS at 12:10

## 2020-11-23 RX ADMIN — ARFORMOTEROL TARTRATE 15 MCG: 15 SOLUTION RESPIRATORY (INHALATION) at 21:47

## 2020-11-23 RX ADMIN — MICONAZOLE NITRATE: 20 POWDER TOPICAL at 10:33

## 2020-11-23 RX ADMIN — INSULIN LISPRO 1 UNITS: 100 INJECTION, SOLUTION INTRAVENOUS; SUBCUTANEOUS at 21:32

## 2020-11-23 RX ADMIN — DEXAMETHASONE SODIUM PHOSPHATE 6 MG: 4 INJECTION, SOLUTION INTRAMUSCULAR; INTRAVENOUS at 21:30

## 2020-11-23 RX ADMIN — BUDESONIDE 250 MCG: 0.25 SUSPENSION RESPIRATORY (INHALATION) at 21:47

## 2020-11-23 RX ADMIN — ATORVASTATIN CALCIUM 10 MG: 10 TABLET, FILM COATED ORAL at 10:32

## 2020-11-23 RX ADMIN — Medication 100 MG: at 11:42

## 2020-11-23 RX ADMIN — Medication 10 ML: at 21:25

## 2020-11-23 RX ADMIN — NYSTATIN 500000 UNITS: 100000 SUSPENSION ORAL at 12:15

## 2020-11-23 ASSESSMENT — PAIN SCALES - GENERAL
PAINLEVEL_OUTOF10: 0
PAINLEVEL_OUTOF10: 0

## 2020-11-23 ASSESSMENT — PAIN SCALES - PAIN ASSESSMENT IN ADVANCED DEMENTIA (PAINAD)
TOTALSCORE: 0
FACIALEXPRESSION: 0
CONSOLABILITY: 0
NEGVOCALIZATION: 0
BREATHING: 0
BODYLANGUAGE: 0

## 2020-11-23 NOTE — PROGRESS NOTES
[facility] Progress Note    Admitting Date and Time: 11/22/2020  8:26 AM  Admit Dx: Pneumonia [J18.9]  Pneumonia [J18.9]    Subjective:  Patient is being followed for Pneumonia [J18.9]  Pneumonia [J18.9]     Patient pleasantly confused  Denies complaints  On NC  On 4 L NC           ROS: denies fever, chills, cp, sob, n/v, HA unless stated above     sodium chloride flush  10 mL Intravenous BID    nystatin  5 mL Oral 4x Daily    miconazole   Topical BID    fluconazole  100 mg Intravenous Q24H    amLODIPine  5 mg Oral Daily    donepezil  10 mg Oral Nightly    lisinopril  40 mg Oral Daily    escitalopram  10 mg Oral Daily    atorvastatin  10 mg Oral Daily    dexamethasone  6 mg Intravenous Q12H    cefTRIAXone (ROCEPHIN) IV  1 g Intravenous Q24H    azithromycin  500 mg Intravenous Q24H    insulin lispro  0-6 Units Subcutaneous TID WC    insulin lispro  0-3 Units Subcutaneous Nightly     sodium chloride flush, 10 mL, PRN  iopamidol, 100 mL, ONCE PRN  glucose, 15 g, PRN  dextrose, 12.5 g, PRN  glucagon (rDNA), 1 mg, PRN  dextrose, 100 mL/hr, PRN         Objective:    /84   Pulse 86   Temp 98 °F (36.7 °C) (Oral)   Resp 20   Ht 5' 11\" (1.803 m)   Wt (!) 318 lb 6 oz (144.4 kg)   SpO2 96%   BMI 44.40 kg/m²     General Appearance: alert and oriented to person and place.  Confused to person  Skin: warm and dry  Head: normocephalic and atraumatic  Neck: neck supple and non tender without mass   Pulmonary/Chest: clear to auscultation bilaterally  Cardiovascular: normal rate, normal S1 and S2 and no carotid bruits  Abdomen: soft, non-tender, non-distended, normal bowel sounds  Extremities: no cyanosis, no clubbing and no edema  Neurologic: speech normal   Simms      Recent Labs     11/22/20  0904 11/22/20  1555 11/23/20  0600 11/23/20  1305     --  139  --    K 4.6  --  5.3* 5.0   CL 95*  --  94*  --    CO2 36*  --  38*  --    BUN 10 10 13  --    CREATININE 0.9 0.9 0.9  -- GLUCOSE 212*  --  270*  --    CALCIUM 9.2  --  8.6  --        Recent Labs     11/22/20  0904 11/22/20  1555 11/23/20  0600   WBC 7.9 6.7 5.1   RBC 4.86 5.36 4.63   HGB 12.5 13.9 11.8*   HCT 42.9 47.8 41.1   MCV 88.3 89.2 88.8   MCH 25.7* 25.9* 25.5*   MCHC 29.1* 29.1* 28.7*   RDW 15.1* 15.1* 15.1*    191 204   MPV 11.9 12.5* 11.9       Assessment:    Active Problems:    Pneumonia    Chronic obstructive pulmonary disease with acute exacerbation (HCC)    Controlled type 2 diabetes mellitus, with long-term current use of insulin ([facility])    Hypoxia    Scrotal edema    Lymphedema    Acute on chronic respiratory failure ([facility])    Fall    COPD exacerbation ([facility]    Uncontrolled type 2 diabetes mellitus with hyperglycemia ([facility] [facility]    Dementia without behavioral disturbance ([facility]  Resolved Problems:    * No resolved hospital problems. *      Plan:  1. Acute on chronic respiratory failure with hypoxia/ hypercapnia: pt came in by EMS with questionable LOC. Per wife pt fell on floor while watching TV. Pt wears 3-4 L at baseline. Noted to have hypercapnia on arrival. C02 85. Pt placed on biPAP. Currently on baseline 4 L NC.    2. Pneumonia bacterial vs viral pneumonia: cxr showing multifocal infiltrates. Concern initially for COVID-19. Viral panel x 2 negative. CTA chest ordered and pending. Pulmonology following- appreciate input. Check procal. Continue IV antibiotics for now. Continue IV azithromycin/ IV rocepin. Started on decadron. Continue droplet plus isolation for now. 3. S/p fall: apparently pt was sitting in chair watching TV- he had fallen asleep in chair and was noted to be on the floor. Questionable LOC vs fall. CT head/ neck with no acute findings. 4. Rhabdomyolysis- mild: ? To fall. Noted to be 713 on arrival- trend    5. COPD with probable COPD exacerbation; pulmonology consulted. Steroids/ nebs    6. Bilateral lower ext venous stasis, lymphedema and chronic wounds    7. Fluid overload: probnp 1973. Received lasix x1 on admission    8. Dementia: ongoing 7+ years. On aricept    9. Morbid obesity: per family pt is always wanting to ear     10. DM; continue meds. Check hg a1c    11. Scrotal edema: catheter placed    12. Candidiasis skin fold: pt was previously stated on diflucan    13. Possible UTI; UA / UC sent    14. HLd: statin    15. Metabolic encephalopathy: likely related to infection/ hypercarbia: pt awake/ alert. Oriented to person and place    16. Deconditioning: pt/ ot    17. Foot wounds; podiatry      NOTE: This report was transcribed using voice recognition software. Every effort was made to ensure accuracy; however, inadvertent computerized transcription errors may be present. Electronically signed by JENNIFER Berrios on 11/23/2020 at 1:55 PM  HOSPITALIST ATTENDING PHYSICIAN NOTE 11/23/2020 2323PM:    Details of the evaluation - subjective assessment (including medication profile, past medical, family and social history when applicable), examination, review of lab and test data, diagnostic impressions and medical decision making - performed by JENNIFER Berrios, were discussed with me on the date of service and I agree with clinical information herein unless otherwise noted. The patient has been evaluated by me personally earlier today. Pt reports no fevers, chills,n/v.     Exam: heart reg at rate of 88,lungs cta, abd pos bs soft nt, ext neg for le edema    I agree with the assessment and plan of JENNIFER Berrios. Acute respiratory failure with hypercapnia and hypoxia  Copd exacerbation  Metabolic encephaloapthy  Abnormal cxr pneumonia bacterial vs viral  Dm type 2 uncontrolled  dementia      Electronically signed by Jeneal Halsted, D.O.   Hospitalist  [facility]

## 2020-11-23 NOTE — PROGRESS NOTES
Date: 11/22/2020    Time: 10:46 PM    Patient Placed On BIPAP/CPAP/ Non-Invasive Ventilation? No    If no must comment. Facial area red/color change? No           If YES are Blister/Lesion present? No   If yes must notify nursing staff  BIPAP/CPAP skin barrier? Yes    Skin barrier type:mepilexlite       Comments: Pt remains on BiPAP at this time.         Foster Ely      11/22/20 4904   NIV Type   Mode Bilevel   Mask Type Full face mask   Mask Size Large   Settings/Measurements   IPAP 18 cmH20   CPAP/EPAP 8 cmH2O   Rate Ordered 16   Resp 16   FiO2  50 %   Vt Exhaled 540 mL   Minute Volume 8.8 Liters   Mask Leak (lpm) 73 lpm   Comfort Level Good   Using Accessory Muscles No   SpO2 99

## 2020-11-23 NOTE — PROGRESS NOTES
Per phone call with Dr Sotero Burgos, he is aware of negative COVID-19 test x2 and has reviewed today's CTA chest. Dr Sotero Burgos states that he believes the patient not to have COVID-19 and recommends moving patient to other inpatient unit. Lenard Ingram, hospitalist NP and [facility] informed.

## 2020-11-23 NOTE — CONSULTS
Department of Podiatry   Consult Note        Reason for Consult:  Pre-ulcerative lesion to right first digit    CHIEF COMPLAINT:  B/L LE edema and ulcer to right first digit     HISTORY OF PRESENT ILLNESS:                 is a 77year old male who was evaluated at bedside for a pre-ulcerative lesion to the right first digit. He has a past medical history of CHF, DM COPD, and dementia.  is a poor historian but believes he was watching television at home but when attempting to get out of bed, he fell. He waited for his wife to call 911 after which, he was transported to the hospital. Patient denies any N/V/D/F/C/SOB/CP and has no other pedal complaints at this time. Past Medical History:        Diagnosis Date    COPD (chronic obstructive pulmonary disease) ([facility]     Dementia ([facility]     Diabetes mellitus ([facility]    ·     Past Surgical History:    · History reviewed. No pertinent surgical history. Medications Prior to Admission:    · Medications Prior to Admission: metFORMIN (GLUCOPHAGE) 500 MG tablet, Take 500 mg by mouth 2 times daily (with meals)  · glimepiride (AMARYL) 2 MG tablet, Take 2 mg by mouth every morning (before breakfast)  · amLODIPine (NORVASC) 5 MG tablet, Take 5 mg by mouth daily  · donepezil (ARICEPT) 10 MG tablet, Take 10 mg by mouth nightly  · lovastatin (MEVACOR) 40 MG tablet, Take 40 mg by mouth nightly  · escitalopram (LEXAPRO) 10 MG tablet, Take 10 mg by mouth daily  · benazepril (LOTENSIN) 40 MG tablet, Take 40 mg by mouth daily    Allergies:  Patient has no known allergies. Social History:   · TOBACCO:   reports that he has quit smoking. He has never used smokeless tobacco.  · ETOH:   reports previous alcohol use. DRUGS:   Social History     Substance and Sexual Activity   Drug Use Never   ·     Family History:   · History reviewed. No pertinent family history.       REVIEW OF SYSTEMS:    All pertinent positives and negatives as stated in the HPI       LOWER EXTREMITY EXAMINATION     VASCULAR:  DP and PT pulses are  faint. CFT delayed B/L. Warm to warm from the tibial tuberosity to the distal aspect of the digits dorsally. No hair growth noted to the distal aspects dorsally. NEUROLOGIC:  Protective sensation is diminished b/l     DERM:  Small, pre-ulcerative lesion measuring about 0.5x0.5x0.2cm was noted to the plantar aspect of the first right digit. Hyperkeratotic skin noted surrounding the lesion. No noted tunneling, active bleeding or purulence expressed. Does not probe to bone. No noted edema or erythema surrounding the wound. Pitting edema noted diffusely to the LEs L>R. Hyperpigmentation noted to the LLE and ankle of RLE. Superficial abrasion with minor drainage noted         MUSCULOSKELETAL:  4/5 Gross Muscle strength in all 4 quadrants.  No posterior calf pain b/l        Wound Care:   Wound #1: right plantar first digit    Size - 0.5x0.5x0.2cm    Appearance - stable/ pre-ulcerative    Drainage - None   Odor - None        CONSULTS:  IP CONSULT TO PRIMARY CARE PROVIDER  IP CONSULT TO PULMONOLOGY  IP CONSULT TO IV TEAM  IP CONSULT TO PODIATRY  IP CONSULT TO SOCIAL WORK    MEDICATION:  Scheduled Meds:   sodium chloride flush  10 mL Intravenous BID    nystatin  5 mL Oral 4x Daily    miconazole   Topical BID    fluconazole  100 mg Intravenous Q24H    amLODIPine  5 mg Oral Daily    donepezil  10 mg Oral Nightly    lisinopril  40 mg Oral Daily    escitalopram  10 mg Oral Daily    atorvastatin  10 mg Oral Daily    dexamethasone  6 mg Intravenous Q12H    cefTRIAXone (ROCEPHIN) IV  1 g Intravenous Q24H    azithromycin  500 mg Intravenous Q24H    insulin lispro  0-6 Units Subcutaneous TID     insulin lispro  0-3 Units Subcutaneous Nightly     Continuous Infusions:   dextrose       PRN Meds:.sodium chloride flush **AND** sodium chloride flush, iopamidol, glucose, dextrose, glucagon (rDNA), dextrose    RADIOLOGY:  CT HEAD WO CONTRAST   Final Result 1. There is no acute intracranial abnormality. Specifically, there is no   intracranial hemorrhage. 2. Atrophy and periventricular leukomalacia,      CT CERVICAL SPINE WO CONTRAST   Final Result   No acute osseous abnormality of the cervical spine. XR CHEST PORTABLE   Final Result   Multifocal bilateral patchy pulmonary infiltrates. XR HIP BILATERAL W AP PELVIS (2 VIEWS)   Final Result   1. There is no right or left hip fracture dislocation   2. Mild degenerative changes of the hips. CTA CHEST W CONTRAST    (Results Pending)       Vitals:    /84   Pulse 86   Temp 98 °F (36.7 °C) (Oral)   Resp 20   Ht 5' 11\" (1.803 m)   Wt (!) 318 lb 6 oz (144.4 kg)   SpO2 96%   BMI 44.40 kg/m²     LABS:   Recent Labs     11/22/20  1555 11/23/20  0600   WBC 6.7 5.1   HGB 13.9 11.8*   HCT 47.8 41.1    204     Recent Labs     11/23/20  0600 11/23/20  1305     --    K 5.3* 5.0   CL 94*  --    CO2 38*  --    PHOS 5.4*  --    BUN 13  --    CREATININE 0.9  --      Recent Labs     11/22/20  1555 11/23/20  0600   PROT 7.4 6.4   INR 1.1  --    APTT 33.0  --        ASSESSMENTS:   Active Problems:  1. Venous stasis  2. Pre-ulcerative lesion to right first digit  3. Chronic edema  4. DM II  5. Lymphedema        Pneumonia    Chronic obstructive pulmonary disease with acute exacerbation (HCC)    Controlled type 2 diabetes mellitus, with long-term current use of insulin ([facility])    Hypoxia    Scrotal edema    Lymphedema    Acute on chronic respiratory failure ([address]    Fall    COPD exacerbation ([facility]    Uncontrolled type 2 diabetes mellitus with hyperglycemia ([facility] [address]    Dementia without behavioral disturbance (HCC)         PLAN:  - Patient's labs, charts and images were reviewed today   - Antibiotics: Azithromycin, Ceftriaxone, Fluconazole   - Cultures were obtained from left leg. Results pending  - Dressing was applied today: Adaptic, DSD, ACE b/l. Daily dressing changes.   - WBC- 5.1, SED RATE -19  - Discussed patient with Dr. Vianey Jose   - Will continue to follow patient while they are in-house. Thank you for the opportunity to take part in the patient's care. Please do not hesitate to call for any questions or concerns.

## 2020-11-23 NOTE — CARE COORDINATION
CASE MANAGEMENT. .. COVID NEG 11/22/2020. Chart reviewed. Patient with dementia. Called wife, Manisha Orantes [phone], to discuss hospital stay and discharge needs. Had an extensive conversation regarding patients worsening dementia, food addiction (50# wt gain), increased weakness. Mrs Justine Vargas states that she patient requires assistance with his ADL's. Voices having a step in shower. Denies having walker or cane, but does have transport wheelchair to use prn. She is interested in [facility] at discharge. Choices provided. PT/OT have been ordered. Await input. Currently requiring 4lnc. Mrs Justine Vargas states that patient wears 3lnc cont. Has concentrator and portable tank through [address] ([facility]). Confirmed with Crystal from [facility] [place] that order reads 2lnc cont. Nursing to wean as tolerated. Patient continues on iv zithromax, iv rocephin, iv decadron and iv diflucan. Will follow along with ss and assist with needs accordingly. The Plan for Transition of Care is related to the following treatment goals: DISCHARGE PLAN    The Patient and/or patient representative was provided with a choice of provider and agrees   with the discharge plan. [x] Yes [] No    Freedom of choice list was provided with basic dialogue that supports the patient's individualized plan of care/goals, treatment preferences and shares the quality data associated with the providers.  [x] Yes [] No

## 2020-11-23 NOTE — CONSULTS
Pulmonary Consultation    Admit Date: 11/22/2020  Requesting Physician: Susan Jimenez MD    CC:  Acute on chronic respiratory failure  HPI:  77years old obese male well-known to our practice due to history of severe COPD, morbid obesity, JACEK, and current respiratory failure 3 to 5 L of O2 and noncompliant with noninvasive ventilation. Present after episode of fall and loss of consciousness while at home. Patient was taken to ER by his family members and patient was found to have acute on chronic respiratory acidosis. The patient is  Unfortunately a very poor  Historian. Denies fever, chills, hemoptysis or chest discomfort    PMH:    Past Medical History:   Diagnosis Date    COPD (chronic obstructive pulmonary disease) (HCC)     Dementia (HCC)     Diabetes mellitus ([facility] [address]      PSH: History reviewed. No pertinent surgical history. Respiratory ROS: no cough, shortness of breath, or wheezing Otherwise, a complete review of systems is undertaken and is negative.     Social History:  · Alcohol:   Social drinker History of Alcohol abuse  · Tobacco: Former heavy smoker  · Employment: no silica or asbestos exposure  Medications:     dextrose        sodium chloride flush  10 mL Intravenous BID    nystatin  5 mL Oral 4x Daily    miconazole   Topical BID    fluconazole  100 mg Intravenous Q24H    amLODIPine  5 mg Oral Daily    donepezil  10 mg Oral Nightly    lisinopril  40 mg Oral Daily    escitalopram  10 mg Oral Daily    atorvastatin  10 mg Oral Daily    dexamethasone  6 mg Intravenous Q12H    cefTRIAXone (ROCEPHIN) IV  1 g Intravenous Q24H    azithromycin  500 mg Intravenous Q24H    insulin lispro  0-6 Units Subcutaneous TID WC    insulin lispro  0-3 Units Subcutaneous Nightly         Vitals:  Tmax:  VITALS:  /84   Pulse 86   Temp 98 °F (36.7 °C) (Oral)   Resp 20   Ht 5' 11\" (1.803 m)   Wt (!) 318 lb 6 oz (144.4 kg)   SpO2 96%   BMI 44.40 kg/m²   24HR INTAKE/OUTPUT:      Intake/Output Summary (Last 24 hours) at 2020 1534  Last data filed at 2020 1028  Gross per 24 hour   Intake 0 ml   Output 1325 ml   Net -1325 ml     CURRENT PULSE OXIMETRY:  SpO2: 96 %  24HR PULSE OXIMETRY RANGE:  SpO2  Av %  Min: 96 %  Max: 99 %        EXAM:  General: No distress. Alert. Morbid obese  Eyes: PERRL. No sclera icterus. No conjunctival injection. ENT: No discharge. Pharynx clear. Neck: Trachea midline. Normal thyroid. Resp: No accessory muscle use. No crackles. No wheezing. No rhonchi. Decreased breath sounds at bases  CV: Regular rate. Regular rhythm. No mumur or rub. ABD: Non-tender. Non-distended. No masses. No organmegaly. Normal bowel sounds. Skin: Warm and dry. No nodule on exposed extremities. No rash on exposed extremities. Surgical dressing both legs  Lymph: No cervical LAD. No supraclavicular LAD. Ext: No joint deformity. No clubbing. No cyanosis. Lymphedema  Neuro: Awake. Follows commands. Positive pupils/gag/corneals. Normal pain response. Lab Results:  CBC:   Recent Labs     20  0904 20  1555 20  0600   WBC 7.9 6.7 5.1   HGB 12.5 13.9 11.8*   HCT 42.9 47.8 41.1   MCV 88.3 89.2 88.8    191 204     BMP:   Recent Labs     20  0904 20  1555 20  0600 20  1305     --  139  --    K 4.6  --  5.3* 5.0   CL 95*  --  94*  --    CO2 36*  --  38*  --    PHOS  --   --  5.4*  --    BUN 10 10 13  --    CREATININE 0.9 0.9 0.9  --       ALB:3,BILIDIR:3,BILITOT:3,ALKPHOS:3)@  PT/INR:   Recent Labs     20  1555   PROTIME 12.1   INR 1.1     Cultures:  -  Respiratory panel negative  ABG: noted  Films:  CXR : Questionable congestion, body habitus may affect view. CT scan of the chest with contrast: Did not show pulmonary embolism. Showed bilateral mild congestion with groundglass. Left upper lobe groundglass infiltrate. Pleural base left upper aspect of the chest opacity. Bilateral small pleural effusions.   No old films to

## 2020-11-23 NOTE — PROGRESS NOTES
Date: 11/22/2020    Time: 8:51 PM    Patient Placed On BIPAP/CPAP/ Non-Invasive Ventilation? No    If no must comment. Facial area red/color change? No           If YES are Blister/Lesion present? No   If yes must notify nursing staff  BIPAP/CPAP skin barrier? Yes    Skin barrier type:mepilexlite       Comments: Pt remains on BiPAP at this time, mepielx in place.         Pepito Valerio      11/22/20 2050   NIV Type   Mode Bilevel   Mask Type Full face mask   Mask Size Large   Settings/Measurements   IPAP 18 cmH20   CPAP/EPAP 8 cmH2O   Rate Ordered 16   Resp 18   FiO2  50 %   Vt Exhaled 545 mL   Minute Volume 10.8 Liters   Mask Leak (lpm) 66 lpm   Comfort Level Good   Using Accessory Muscles No

## 2020-11-24 LAB
ALBUMIN SERPL-MCNC: 3.1 G/DL (ref 3.5–5.2)
ALP BLD-CCNC: 63 U/L (ref 40–129)
ALT SERPL-CCNC: 25 U/L (ref 0–40)
ANION GAP SERPL CALCULATED.3IONS-SCNC: 5 MMOL/L (ref 7–16)
AST SERPL-CCNC: 21 U/L (ref 0–39)
B.E.: 10.6 MMOL/L (ref -3–0)
BASOPHILS ABSOLUTE: 0 E9/L (ref 0–0.2)
BASOPHILS RELATIVE PERCENT: 0 % (ref 0–2)
BILIRUB SERPL-MCNC: 0.2 MG/DL (ref 0–1.2)
BUN BLDV-MCNC: 24 MG/DL (ref 8–23)
CALCIUM SERPL-MCNC: 8.7 MG/DL (ref 8.6–10.2)
CHLORIDE BLD-SCNC: 94 MMOL/L (ref 98–107)
CO2: 37 MMOL/L (ref 22–29)
CREAT SERPL-MCNC: 0.9 MG/DL (ref 0.7–1.2)
CRITICAL NOTIFICATION: YES
DELIVERY SYSTEMS: ABNORMAL
DEVICE: ABNORMAL
EOSINOPHILS ABSOLUTE: 0.01 E9/L (ref 0.05–0.5)
EOSINOPHILS RELATIVE PERCENT: 0.1 % (ref 0–6)
FIO2 ARTERIAL: 3
GFR AFRICAN AMERICAN: >60
GFR NON-AFRICAN AMERICAN: >60 ML/MIN/1.73
GLUCOSE BLD-MCNC: 361 MG/DL (ref 74–99)
HCO3 ARTERIAL: 38.7 MMOL/L (ref 22–26)
HCT VFR BLD CALC: 38.1 % (ref 37–54)
HEMOGLOBIN: 11.3 G/DL (ref 12.5–16.5)
IMMATURE GRANULOCYTES #: 0.07 E9/L
IMMATURE GRANULOCYTES %: 0.9 % (ref 0–5)
LV EF: 38 %
LVEF MODALITY: NORMAL
LYMPHOCYTES ABSOLUTE: 0.4 E9/L (ref 1.5–4)
LYMPHOCYTES RELATIVE PERCENT: 5 % (ref 20–42)
MCH RBC QN AUTO: 25.7 PG (ref 26–35)
MCHC RBC AUTO-ENTMCNC: 29.7 % (ref 32–34.5)
MCV RBC AUTO: 86.6 FL (ref 80–99.9)
METER GLUCOSE: 284 MG/DL (ref 74–99)
METER GLUCOSE: 289 MG/DL (ref 74–99)
METER GLUCOSE: 300 MG/DL (ref 74–99)
METER GLUCOSE: 314 MG/DL (ref 74–99)
MONOCYTES ABSOLUTE: 0.22 E9/L (ref 0.1–0.95)
MONOCYTES RELATIVE PERCENT: 2.7 % (ref 2–12)
NEUTROPHILS ABSOLUTE: 7.38 E9/L (ref 1.8–7.3)
NEUTROPHILS RELATIVE PERCENT: 91.3 % (ref 43–80)
O2 SATURATION: 90.1 % (ref 92–98.5)
OPERATOR ID: 1067
OVALOCYTES: ABNORMAL
PCO2 ARTERIAL: 67.4 MMHG (ref 35–45)
PDW BLD-RTO: 15.4 FL (ref 11.5–15)
PH BLOOD GAS: 7.37 (ref 7.35–7.45)
PLATELET # BLD: 216 E9/L (ref 130–450)
PMV BLD AUTO: 12.3 FL (ref 7–12)
PO2 ARTERIAL: 63.6 MMHG (ref 60–80)
POIKILOCYTES: ABNORMAL
POTASSIUM SERPL-SCNC: 4.7 MMOL/L (ref 3.5–5)
PROCALCITONIN: 0.06 NG/ML (ref 0–0.08)
PROCALCITONIN: 0.07 NG/ML (ref 0–0.08)
RBC # BLD: 4.4 E12/L (ref 3.8–5.8)
SODIUM BLD-SCNC: 136 MMOL/L (ref 132–146)
SOURCE, BLOOD GAS: ABNORMAL
TOTAL PROTEIN: 6.2 G/DL (ref 6.4–8.3)
URINE CULTURE, ROUTINE: NORMAL
WBC # BLD: 8.1 E9/L (ref 4.5–11.5)

## 2020-11-24 PROCEDURE — 6360000002 HC RX W HCPCS: Performed by: INTERNAL MEDICINE

## 2020-11-24 PROCEDURE — 94660 CPAP INITIATION&MGMT: CPT

## 2020-11-24 PROCEDURE — 82962 GLUCOSE BLOOD TEST: CPT

## 2020-11-24 PROCEDURE — 36415 COLL VENOUS BLD VENIPUNCTURE: CPT

## 2020-11-24 PROCEDURE — 94640 AIRWAY INHALATION TREATMENT: CPT

## 2020-11-24 PROCEDURE — 6370000000 HC RX 637 (ALT 250 FOR IP): Performed by: INTERNAL MEDICINE

## 2020-11-24 PROCEDURE — 85025 COMPLETE CBC W/AUTO DIFF WBC: CPT

## 2020-11-24 PROCEDURE — 6370000000 HC RX 637 (ALT 250 FOR IP): Performed by: CLINICAL NURSE SPECIALIST

## 2020-11-24 PROCEDURE — 99232 SBSQ HOSP IP/OBS MODERATE 35: CPT | Performed by: INTERNAL MEDICINE

## 2020-11-24 PROCEDURE — 6360000004 HC RX CONTRAST MEDICATION: Performed by: NURSE PRACTITIONER

## 2020-11-24 PROCEDURE — 6360000002 HC RX W HCPCS: Performed by: CLINICAL NURSE SPECIALIST

## 2020-11-24 PROCEDURE — 2580000003 HC RX 258: Performed by: INTERNAL MEDICINE

## 2020-11-24 PROCEDURE — 84145 PROCALCITONIN (PCT): CPT

## 2020-11-24 PROCEDURE — 97161 PT EVAL LOW COMPLEX 20 MIN: CPT

## 2020-11-24 PROCEDURE — 80053 COMPREHEN METABOLIC PANEL: CPT

## 2020-11-24 PROCEDURE — 82803 BLOOD GASES ANY COMBINATION: CPT

## 2020-11-24 PROCEDURE — 2060000000 HC ICU INTERMEDIATE R&B

## 2020-11-24 PROCEDURE — 97530 THERAPEUTIC ACTIVITIES: CPT

## 2020-11-24 PROCEDURE — 93306 TTE W/DOPPLER COMPLETE: CPT

## 2020-11-24 PROCEDURE — 2700000000 HC OXYGEN THERAPY PER DAY

## 2020-11-24 RX ORDER — DOXYCYCLINE HYCLATE 100 MG/1
100 CAPSULE ORAL EVERY 12 HOURS SCHEDULED
Status: DISCONTINUED | OUTPATIENT
Start: 2020-11-24 | End: 2020-11-30 | Stop reason: HOSPADM

## 2020-11-24 RX ADMIN — ESCITALOPRAM OXALATE 10 MG: 10 TABLET ORAL at 09:07

## 2020-11-24 RX ADMIN — LISINOPRIL 40 MG: 20 TABLET ORAL at 09:07

## 2020-11-24 RX ADMIN — NYSTATIN 500000 UNITS: 100000 SUSPENSION ORAL at 16:17

## 2020-11-24 RX ADMIN — BUDESONIDE 250 MCG: 0.25 SUSPENSION RESPIRATORY (INHALATION) at 21:25

## 2020-11-24 RX ADMIN — DONEPEZIL HYDROCHLORIDE 10 MG: 5 TABLET, FILM COATED ORAL at 20:28

## 2020-11-24 RX ADMIN — FUROSEMIDE 20 MG: 10 INJECTION, SOLUTION INTRAMUSCULAR; INTRAVENOUS at 09:06

## 2020-11-24 RX ADMIN — DOXYCYCLINE HYCLATE 100 MG: 100 CAPSULE ORAL at 20:28

## 2020-11-24 RX ADMIN — INSULIN LISPRO 4 UNITS: 100 INJECTION, SOLUTION INTRAVENOUS; SUBCUTANEOUS at 06:39

## 2020-11-24 RX ADMIN — ARFORMOTEROL TARTRATE 15 MCG: 15 SOLUTION RESPIRATORY (INHALATION) at 21:25

## 2020-11-24 RX ADMIN — Medication 100 MG: at 13:00

## 2020-11-24 RX ADMIN — AZITHROMYCIN 500 MG: 500 INJECTION, POWDER, LYOPHILIZED, FOR SOLUTION INTRAVENOUS at 11:46

## 2020-11-24 RX ADMIN — NYSTATIN 500000 UNITS: 100000 SUSPENSION ORAL at 20:28

## 2020-11-24 RX ADMIN — INSULIN LISPRO 4 UNITS: 100 INJECTION, SOLUTION INTRAVENOUS; SUBCUTANEOUS at 16:09

## 2020-11-24 RX ADMIN — MICONAZOLE NITRATE: 20 POWDER TOPICAL at 09:07

## 2020-11-24 RX ADMIN — INSULIN LISPRO 2 UNITS: 100 INJECTION, SOLUTION INTRAVENOUS; SUBCUTANEOUS at 20:28

## 2020-11-24 RX ADMIN — PERFLUTREN 1.65 MG: 6.52 INJECTION, SUSPENSION INTRAVENOUS at 15:45

## 2020-11-24 RX ADMIN — Medication 10 ML: at 09:09

## 2020-11-24 RX ADMIN — MICONAZOLE NITRATE: 20 POWDER TOPICAL at 20:28

## 2020-11-24 RX ADMIN — CEFTRIAXONE 1 G: 1 INJECTION, POWDER, FOR SOLUTION INTRAMUSCULAR; INTRAVENOUS at 11:17

## 2020-11-24 RX ADMIN — NYSTATIN 500000 UNITS: 100000 SUSPENSION ORAL at 09:08

## 2020-11-24 RX ADMIN — BUDESONIDE 250 MCG: 0.25 SUSPENSION RESPIRATORY (INHALATION) at 10:09

## 2020-11-24 RX ADMIN — DEXAMETHASONE SODIUM PHOSPHATE 6 MG: 4 INJECTION, SOLUTION INTRAMUSCULAR; INTRAVENOUS at 09:08

## 2020-11-24 RX ADMIN — AMLODIPINE BESYLATE 5 MG: 5 TABLET ORAL at 09:07

## 2020-11-24 RX ADMIN — ARFORMOTEROL TARTRATE 15 MCG: 15 SOLUTION RESPIRATORY (INHALATION) at 10:09

## 2020-11-24 RX ADMIN — ATORVASTATIN CALCIUM 10 MG: 10 TABLET, FILM COATED ORAL at 09:07

## 2020-11-24 RX ADMIN — Medication 10 ML: at 20:28

## 2020-11-24 RX ADMIN — INSULIN LISPRO 3 UNITS: 100 INJECTION, SOLUTION INTRAVENOUS; SUBCUTANEOUS at 11:18

## 2020-11-24 RX ADMIN — NYSTATIN 500000 UNITS: 100000 SUSPENSION ORAL at 11:17

## 2020-11-24 ASSESSMENT — PAIN SCALES - GENERAL
PAINLEVEL_OUTOF10: 0

## 2020-11-24 NOTE — PROGRESS NOTES
Date: 11/23/2020    Time: 11:39 PM    Patient Placed On BIPAP/CPAP/ Non-Invasive Ventilation? Yes    If no must comment. Facial area red/color change? No           If YES are Blister/Lesion present? No   If yes must notify nursing staff  BIPAP/CPAP skin barrier?   Yes    Skin barrier type:mepilexlite       Comments:        Lizet Estrada

## 2020-11-24 NOTE — PLAN OF CARE
Problem: Falls - Risk of:  Goal: Will remain free from falls  Description: Will remain free from falls  11/24/2020 1208 by He Durham  Outcome: Met This Shift  11/24/2020 0005 by Gautam Flores RN  Outcome: Met This Shift  Goal: Absence of physical injury  Description: Absence of physical injury  11/24/2020 1208 by He Durham  Outcome: Met This Shift  11/24/2020 0005 by Gautam Flores RN  Outcome: Met This Shift     Problem: Skin Integrity:  Goal: Will show no infection signs and symptoms  Description: Will show no infection signs and symptoms  11/24/2020 1208 by He Durham  Outcome: Met This Shift  11/24/2020 0005 by Gautam Flores RN  Outcome: Met This Shift  Goal: Absence of new skin breakdown  Description: Absence of new skin breakdown  11/24/2020 1208 by He Durham  Outcome: Met This Shift  11/24/2020 0005 by Gautam Flores RN  Outcome: Met This Shift     Problem: Confusion - Acute:  Goal: Absence of continued neurological deterioration signs and symptoms  Description: Absence of continued neurological deterioration signs and symptoms  11/24/2020 1208 by He Durham  Outcome: Met This Shift  11/24/2020 0005 by Gautam Flores RN  Outcome: Met This Shift  Goal: Mental status will be restored to baseline  Description: Mental status will be restored to baseline  11/24/2020 1208 by He Durham  Outcome: Met This Shift  11/24/2020 0005 by Gautam Flores RN  Outcome: Met This Shift     Problem: Discharge Planning:  Goal: Ability to perform activities of daily living will improve  Description: Ability to perform activities of daily living will improve  11/24/2020 1208 by He Durham  Outcome: Met This Shift  11/24/2020 0005 by Gautam Flores RN  Outcome: Met This Shift  Goal: Participates in care planning  Description: Participates in care planning  11/24/2020 1208 by He Durham  Outcome: Met This Shift  11/24/2020 0005 by Gautam Flores RN  Outcome: Met This Shift     Problem: Injury - Risk of, Physical Injury:  Goal: Will remain free from falls  Description: Will remain free from falls  11/24/2020 1208 by Arlene Floress  Outcome: Met This Shift  11/24/2020 0005 by Sinai Clark RN  Outcome: Met This Shift  Goal: Absence of physical injury  Description: Absence of physical injury  11/24/2020 1208 by Arlene Floress  Outcome: Met This Shift  11/24/2020 0005 by Sinai Clark RN  Outcome: Met This Shift     Problem: Mood - Altered:  Goal: Mood stable  Description: Mood stable  11/24/2020 1208 by Arlene Hipps  Outcome: Met This Shift  11/24/2020 0005 by Sinai Clark RN  Outcome: Met This Shift  Goal: Absence of abusive behavior  Description: Absence of abusive behavior  11/24/2020 1208 by Arlene Hipps  Outcome: Met This Shift  11/24/2020 0005 by Sinai Clark RN  Outcome: Met This Shift  Goal: Verbalizations of feeling emotionally comfortable while being cared for will increase  Description: Verbalizations of feeling emotionally comfortable while being cared for will increase  11/24/2020 1208 by Arlene Hipps  Outcome: Met This Shift  11/24/2020 0005 by Sinai Clark RN  Outcome: Met This Shift     Problem: Psychomotor Activity - Altered:  Goal: Absence of psychomotor disturbance signs and symptoms  Description: Absence of psychomotor disturbance signs and symptoms  11/24/2020 1208 by Arlene Hipps  Outcome: Met This Shift  11/24/2020 0005 by Sinai Clark RN  Outcome: Met This Shift     Problem: Sensory Perception - Impaired:  Goal: Demonstrations of improved sensory functioning will increase  Description: Demonstrations of improved sensory functioning will increase  11/24/2020 1208 by Arlene Hipps  Outcome: Met This Shift  11/24/2020 0005 by Sinai Clark RN  Outcome: Met This Shift  Goal: Decrease in sensory misperception frequency  Description: Decrease in sensory misperception frequency  11/24/2020 1208 by Arlene Hipps  Outcome: Met This Shift  11/24/2020 0005 by Marie Araujo RN  Outcome: Met This Shift  Goal: Able to refrain from responding to false sensory perceptions  Description: Able to refrain from responding to false sensory perceptions  11/24/2020 1208 by Tj Marvin  Outcome: Met This Shift  11/24/2020 0005 by Marie Araujo RN  Outcome: Met This Shift  Goal: Demonstrates accurate environmental perceptions  Description: Demonstrates accurate environmental perceptions  11/24/2020 1208 by Tj Marvin  Outcome: Met This Shift  11/24/2020 0005 by Marie Araujo RN  Outcome: Met This Shift  Goal: Able to distinguish between reality-based and nonreality-based thinking  Description: Able to distinguish between reality-based and nonreality-based thinking  11/24/2020 1208 by Tj Marvin  Outcome: Met This Shift  11/24/2020 0005 by Marie Araujo RN  Outcome: Met This Shift  Goal: Able to interrupt nonreality-based thinking  Description: Able to interrupt nonreality-based thinking  11/24/2020 1208 by Tj Marvin  Outcome: Met This Shift  11/24/2020 0005 by Marie Araujo RN  Outcome: Met This Shift     Problem: Sleep Pattern Disturbance:  Goal: Appears well-rested  Description: Appears well-rested  11/24/2020 1208 by Tj Marvin  Outcome: Met This Shift  11/24/2020 0005 by Marie Araujo RN  Outcome: Met This Shift     Problem:  Activity:  Goal: Ability to tolerate increased activity will improve  Description: Ability to tolerate increased activity will improve  11/24/2020 1208 by Tj Marvin  Outcome: Met This Shift  11/24/2020 0005 by Marie Araujo RN  Outcome: Met This Shift     Problem: Cardiac:  Goal: Hemodynamic stability will improve  Description: Hemodynamic stability will improve  11/24/2020 1208 by Tj Marvin  Outcome: Met This Shift  11/24/2020 0005 by Marie Araujo RN  Outcome: Met This Shift  Goal: Complications related to the disease process, condition or treatment will be avoided or minimized  Description: Complications related to the disease process, condition or treatment will be avoided or minimized  11/24/2020 1208 by Anabel Simpson  Outcome: Met This Shift  11/24/2020 0005 by Tamela Mireles RN  Outcome: Met This Shift  Goal: Cerebral tissue perfusion will improve  Description: Cerebral tissue perfusion will improve  11/24/2020 1208 by Anabel Simpson  Outcome: Met This Shift  11/24/2020 0005 by Tamela Mireles RN  Outcome: Met This Shift     Problem: Coping:  Goal: Ability to identify and develop effective coping behavior will improve  Description: Ability to identify and develop effective coping behavior will improve  11/24/2020 1208 by Anabel Simpson  Outcome: Met This Shift  11/24/2020 0005 by Tamela Mireles RN  Outcome: Met This Shift     Problem: Health Behavior:  Goal: Identification of resources available to assist in meeting health care needs will improve  Description: Identification of resources available to assist in meeting health care needs will improve  11/24/2020 1208 by Anabel Simpson  Outcome: Met This Shift  11/24/2020 0005 by Tamela Mireles RN  Outcome: Met This Shift     Problem: Nutritional:  Goal: Ability to identify appropriate dietary choices will improve  Description: Ability to identify appropriate dietary choices will improve  11/24/2020 1208 by Anabel Simpson  Outcome: Met This Shift  11/24/2020 0005 by Tamela Mireles RN  Outcome: Met This Shift     Problem: Airway Clearance - Ineffective:  Goal: Ability to maintain a clear airway will improve  Description: Ability to maintain a clear airway will improve  11/24/2020 1208 by Anabel Simpson  Outcome: Met This Shift  11/24/2020 0005 by Tamela Mireles RN  Outcome: Met This Shift

## 2020-11-24 NOTE — PROGRESS NOTES
[facility] Progress Note    Admitting Date and Time: 11/22/2020  8:26 AM  Admit Dx: Pneumonia [J18.9]  Pneumonia [J18.9]    Subjective:  Patient is being followed for Pneumonia [J18.9]  Pneumonia [J18.9]     Patient awake, alert, resting in bed in no acute distress watching TV  Pleasantly confused  On 3 L NC  Denies sob  No issues per nursing            ROS: denies fever, chills, cp, sob, n/v, HA unless stated above.  sodium chloride flush  10 mL Intravenous BID    furosemide  20 mg Intravenous Daily    budesonide  250 mcg Nebulization BID    Arformoterol Tartrate  15 mcg Nebulization BID    nystatin  5 mL Oral 4x Daily    miconazole   Topical BID    fluconazole  100 mg Intravenous Q24H    amLODIPine  5 mg Oral Daily    donepezil  10 mg Oral Nightly    lisinopril  40 mg Oral Daily    escitalopram  10 mg Oral Daily    atorvastatin  10 mg Oral Daily    cefTRIAXone (ROCEPHIN) IV  1 g Intravenous Q24H    azithromycin  500 mg Intravenous Q24H    insulin lispro  0-6 Units Subcutaneous TID WC    insulin lispro  0-3 Units Subcutaneous Nightly     sodium chloride flush, 10 mL, PRN  glucose, 15 g, PRN  dextrose, 12.5 g, PRN  glucagon (rDNA), 1 mg, PRN  dextrose, 100 mL/hr, PRN         Objective:    BP (!) 147/83   Pulse 84   Temp 97.4 °F (36.3 °C) (Oral)   Resp 18   Ht 5' 11\" (1.803 m)   Wt (!) 365 lb 8 oz (165.8 kg)   SpO2 94%   BMI 50.98 kg/m²   General Appearance: alert and oriented to person and place.  Confused to person  Skin: warm and dry  Head: normocephalic and atraumatic  Neck: neck supple and non tender without mass   Pulmonary/Chest: clear to auscultation bilaterally  Cardiovascular: normal rate, normal S1 and S2 and no carotid bruits  Abdomen: soft, non-tender, non-distended, normal bowel sounds  Extremities: no cyanosis, no clubbing and no edema, foot wounds with DSd  Neurologic: speech normal   Simms      Recent Labs     11/22/20  0904 11/22/20  1555 11/23/20  0600 11/23/20  1305 11/24/20  0405     --  139  --  136   K 4.6  --  5.3* 5.0 4.7   CL 95*  --  94*  --  94*   CO2 36*  --  38*  --  37*   BUN 10 10 13  --  24*   CREATININE 0.9 0.9 0.9  --  0.9   GLUCOSE 212*  --  270*  --  361*   CALCIUM 9.2  --  8.6  --  8.7       Recent Labs     11/22/20  1555 11/23/20  0600 11/24/20  0405   WBC 6.7 5.1 8.1   RBC 5.36 4.63 4.40   HGB 13.9 11.8* 11.3*   HCT 47.8 41.1 38.1   MCV 89.2 88.8 86.6   MCH 25.9* 25.5* 25.7*   MCHC 29.1* 28.7* 29.7*   RDW 15.1* 15.1* 15.4*    204 216   MPV 12.5* 11.9 12.3*           Assessment:    Active Problems:    Pneumonia    Chronic obstructive pulmonary disease with acute exacerbation (HCC)    Controlled type 2 diabetes mellitus, with long-term current use of insulin (HCC)    Hypoxia    Scrotal edema    Lymphedema    Acute on chronic respiratory failure (HCC)    Fall    COPD exacerbation ([facility] [address]    Uncontrolled type 2 diabetes mellitus with hyperglycemia ([facility])    Dementia without behavioral disturbance ([facility] [address]  Resolved Problems:    * No resolved hospital problems. *      Plan:  1. Acute on chronic respiratory failure with hypoxia/ hypercapnia: pt came in by EMS with questionable LOC. Per wife pt fell on floor while watching TV. Pt wears 3-4 L at baseline. Noted to have hypercapnia on arrival. C02 85. Pulmonology consulted. Pt placed on biPAP. Currently on baseline 4 L NC. BiPAP prn. On 3 L NC this am.     2. Pneumonia vs fluid overload:  cxr showing multifocal infiltrates. Concern initially for COVID-19. Viral panel x 2 negative. CTA chest ordered and reviewed. Pulmonology following- appreciate input. Check procal-pending. Continue IV antibiotics for now. Continue IV azithromycin/ IV rocephin. Stop decadron. Pulmonology feel low suspicion for COVID- 19- droplet plus isolation discontinued. Check echo. Started on IV diuresis.      3. S/p fall- unwitnessed: apparently pt was sitting in chair watching TV- he had fallen asleep in chair and was noted to be on the floor. Questionable LOC vs fall. CT head/ neck with no acute findings.     4. Rhabdomyolysis- mild: ? To fall. Noted to be 713 on arrival- trend     5. COPD with probable COPD exacerbation; pulmonology consulted. nebs     6. Bilateral lower ext venous stasis, lymphedema and chronic wounds: wound care     7. Fluid overload: probnp 1973. On furosemide IV daily. Intake./output FR. Per wife pt had been gaining weight at home. Check echo. Neg 5 L     8. Dementia: ongoing 7+ years. On aricept     9. Morbid obesity: per family pt is always wanting to ear      10. DM; continue meds. Check hg a1c 8.9     11. Scrotal edema: catheter placed     12. Candidiasis skin fold: pt was previously stated on diflucan     13. Possible UTI; UA / UC sent     14. HLd: statin      15. Metabolic encephalopathy: likely related to infection/ hypercarbia: pt awake/ alert. Oriented to person and place     16. Deconditioning: pt/ ot     17. Foot wounds; podiatry      Dispo: per nursing wife has voided to staff that she feels overwhelmed with care of pt. PT/OT- ? JEFFREY on dc      Discussed with attending. Pt follows with Dr. Vera Bearden. He was admitted to [facility] due to conern for COVID-19 which was ruled out. Nursing did touch base with Dr. Scotty Carey who is covering Dr. Vera Bearden. They will resume care of pt tomorrow. NOTE: This report was transcribed using voice recognition software. Every effort was made to ensure accuracy; however, inadvertent computerized transcription errors may be present.   Electronically signed by JENNIFER Paz on 11/24/2020 at 12:41 PM  HOSPITALIST ATTENDING PHYSICIAN NOTE 11/24/2020 2332PM:    Details of the evaluation - subjective assessment (including medication profile, past medical, family and social history when applicable), examination, review of lab and test data, diagnostic impressions and medical decision making - performed by JENNIFER Paz, were discussed with me on the date of service and I agree with clinical information herein unless otherwise noted. The patient has been evaluated by me personally earlier today. Pt reports no fevers, chills,n/v.     Exam: heart reg at rate of 88,lungs cta, abd pos bs soft nt, ext neg for le edmea    I agree with the assessment and plan of JENNIFER Simpson. Acute respiratory failure with hypercapnia and hypoxia  Copd exacerbation  Metabolic encephaloapthy  Abnormal cxr pneumonia bacterial vs viral  Dm type 2 uncontrolled  dementia        Electronically signed by Arsalan Sheets D.O.   Hospitalist  [facility] Hospitalist [facility] at [facility]

## 2020-11-24 NOTE — PROGRESS NOTES
Alert.  Morbid obese  Eyes: PERRL. No sclera icterus. No conjunctival injection. ENT: No discharge. Pharynx clear. Neck: Trachea midline. Normal thyroid. Resp: No accessory muscle use. Faint exp wheeze bibasilar. No crackles. No rhonchi. Decreased breath sounds at bases  CV: Regular rate. Regular rhythm. No mumur or rub. ABD: Non-tender. Non-distended. No masses. No organmegaly. Normal bowel sounds. Skin: Warm and dry. No nodule on exposed extremities. No rash on exposed extremities. Surgical dressing both legs  Lymph: No cervical LAD. No supraclavicular LAD. Ext: No joint deformity. No clubbing. No cyanosis. Lymphedema  Neuro: Awake. Follows commands. Positive pupils/gag/corneals. Normal pain response. Lab Results:  CBC:   Recent Labs     11/22/20  1555 11/23/20  0600 11/24/20  0405   WBC 6.7 5.1 8.1   HGB 13.9 11.8* 11.3*   HCT 47.8 41.1 38.1   MCV 89.2 88.8 86.6    204 216     BMP:   Recent Labs     11/22/20  0904 11/22/20  1555 11/23/20  0600 11/23/20  1305 11/24/20  0405     --  139  --  136   K 4.6  --  5.3* 5.0 4.7   CL 95*  --  94*  --  94*   CO2 36*  --  38*  --  37*   PHOS  --   --  5.4*  --   --    BUN 10 10 13  --  24*   CREATININE 0.9 0.9 0.9  --  0.9      ALB:3,BILIDIR:3,BILITOT:3,ALKPHOS:3)@  PT/INR:   Recent Labs     11/22/20  1555   PROTIME 12.1   INR 1.1     Cultures:  -  Respiratory panel negative  ABG: noted  Films:  CXR : Questionable congestion, body habitus may affect view. CT scan of the chest with contrast: Did not show pulmonary embolism. Showed bilateral mild congestion with groundglass. Left upper lobe groundglass infiltrate. Pleural base left upper aspect of the chest opacity. Bilateral small pleural effusions.   No old films to compare      Assessment:  #1 acute on chronic respiratory failure, hypercapnia  #2 volume overload  #3 less likely to be an infectious process  #4 history of severe COPD  #5 history of JACEK  #6 history of morbid obesity  #7 abnormal CT scan of the chest with left-sided pleural-based opacity and small left upper lobe groundglass opacity      Plan:  · Will order procalcitonin. If within normal limits, D/C the Rocephin. continue Zithromax  · Diuretics as indicated, ProBNP 1973  · Continue Brovana/Pulmicort nebs  · Noninvasive ventilation 4 hours on and 4 hours off, ABG with respiratory acidosis but pt refusing BIPAP. CO2 improved 85-->67   · CT/PET scan as an outpatient 6 to 8 weeks unless able to find prior CT scan of the chest and  compare. Chris Polk CNP    Seen and evaluated, agree with above assessment and plan  Follow procalcitonin. Because staph aureus infection in the wound will DC Zithromax and change to doxycycline.   May change BiPAP to nightly and as needed

## 2020-11-24 NOTE — PROGRESS NOTES
Occupational Therapy  Date:11/24/2020  Patient Name: Myranda Condon  Room: 6820/1268-E     Occupational Therapy (OT) order received, patient's medical record reviewed, and OT evaluation attempted this date; patient unavailable secondary to undergoing bedside test. OT evaluation to be re-attempted at later date, as able/appropriate. Massiel Shanks, OTR/L  License Number: [id number]

## 2020-11-24 NOTE — PLAN OF CARE
Problem: Falls - Risk of:  Goal: Will remain free from falls  Description: Will remain free from falls  Outcome: Met This Shift  Goal: Absence of physical injury  Description: Absence of physical injury  Outcome: Met This Shift     Problem: Skin Integrity:  Goal: Will show no infection signs and symptoms  Description: Will show no infection signs and symptoms  Outcome: Met This Shift  Goal: Absence of new skin breakdown  Description: Absence of new skin breakdown  Outcome: Met This Shift     Problem: Confusion - Acute:  Goal: Absence of continued neurological deterioration signs and symptoms  Description: Absence of continued neurological deterioration signs and symptoms  Outcome: Met This Shift  Goal: Mental status will be restored to baseline  Description: Mental status will be restored to baseline  Outcome: Met This Shift     Problem: Discharge Planning:  Goal: Ability to perform activities of daily living will improve  Description: Ability to perform activities of daily living will improve  Outcome: Met This Shift  Goal: Participates in care planning  Description: Participates in care planning  Outcome: Met This Shift     Problem: Injury - Risk of, Physical Injury:  Goal: Will remain free from falls  Description: Will remain free from falls  Outcome: Met This Shift  Goal: Absence of physical injury  Description: Absence of physical injury  Outcome: Met This Shift     Problem: Mood - Altered:  Goal: Mood stable  Description: Mood stable  Outcome: Met This Shift  Goal: Absence of abusive behavior  Description: Absence of abusive behavior  Outcome: Met This Shift  Goal: Verbalizations of feeling emotionally comfortable while being cared for will increase  Description: Verbalizations of feeling emotionally comfortable while being cared for will increase  Outcome: Met This Shift     Problem: Psychomotor Activity - Altered:  Goal: Absence of psychomotor disturbance signs and symptoms  Description: Absence of psychomotor disturbance signs and symptoms  Outcome: Met This Shift     Problem: Sensory Perception - Impaired:  Goal: Demonstrations of improved sensory functioning will increase  Description: Demonstrations of improved sensory functioning will increase  Outcome: Met This Shift  Goal: Decrease in sensory misperception frequency  Description: Decrease in sensory misperception frequency  Outcome: Met This Shift  Goal: Able to refrain from responding to false sensory perceptions  Description: Able to refrain from responding to false sensory perceptions  Outcome: Met This Shift  Goal: Demonstrates accurate environmental perceptions  Description: Demonstrates accurate environmental perceptions  Outcome: Met This Shift  Goal: Able to distinguish between reality-based and nonreality-based thinking  Description: Able to distinguish between reality-based and nonreality-based thinking  Outcome: Met This Shift  Goal: Able to interrupt nonreality-based thinking  Description: Able to interrupt nonreality-based thinking  Outcome: Met This Shift     Problem: Sleep Pattern Disturbance:  Goal: Appears well-rested  Description: Appears well-rested  Outcome: Met This Shift     Problem:  Activity:  Goal: Ability to tolerate increased activity will improve  Description: Ability to tolerate increased activity will improve  Outcome: Met This Shift     Problem: Cardiac:  Goal: Hemodynamic stability will improve  Description: Hemodynamic stability will improve  Outcome: Met This Shift  Goal: Complications related to the disease process, condition or treatment will be avoided or minimized  Description: Complications related to the disease process, condition or treatment will be avoided or minimized  Outcome: Met This Shift  Goal: Cerebral tissue perfusion will improve  Description: Cerebral tissue perfusion will improve  Outcome: Met This Shift     Problem: Coping:  Goal: Ability to identify and develop effective coping behavior will improve  Description: Ability to identify and develop effective coping behavior will improve  Outcome: Met This Shift     Problem: Health Behavior:  Goal: Identification of resources available to assist in meeting health care needs will improve  Description: Identification of resources available to assist in meeting health care needs will improve  Outcome: Met This Shift     Problem: Nutritional:  Goal: Ability to identify appropriate dietary choices will improve  Description: Ability to identify appropriate dietary choices will improve  Outcome: Met This Shift     Problem: Airway Clearance - Ineffective:  Goal: Ability to maintain a clear airway will improve  Description: Ability to maintain a clear airway will improve  Outcome: Met This Shift

## 2020-11-24 NOTE — PROGRESS NOTES
0.5x0.5x0.2cm was noted to the plantar aspect of the first right digit. Hyperkeratotic skin noted surrounding the lesion. No noted tunneling, active bleeding or purulence expressed. Does not probe to bone. No noted edema or erythema surrounding the wound. Pitting edema noted diffusely to the LEs L>R. Hyperpigmentation noted to the LLE and ankle of RLE. Superficial abrasion with minor drainage noted         MUSCULOSKELETAL:  4/5 Gross Muscle strength in all 4 quadrants. No posterior calf pain b/l        Wound Care:   Wound #1: right plantar first digit    Size - 0.5x0.5x0.2cm    Appearance - stable/ pre-ulcerative    Drainage - None   Odor - None        CONSULTS:  IP CONSULT TO PRIMARY CARE PROVIDER  IP CONSULT TO PULMONOLOGY  IP CONSULT TO IV TEAM  IP CONSULT TO PODIATRY  IP CONSULT TO SOCIAL WORK    MEDICATION:  Scheduled Meds:   sodium chloride flush  10 mL Intravenous BID    furosemide  20 mg Intravenous Daily    budesonide  250 mcg Nebulization BID    Arformoterol Tartrate  15 mcg Nebulization BID    nystatin  5 mL Oral 4x Daily    miconazole   Topical BID    fluconazole  100 mg Intravenous Q24H    amLODIPine  5 mg Oral Daily    donepezil  10 mg Oral Nightly    lisinopril  40 mg Oral Daily    escitalopram  10 mg Oral Daily    atorvastatin  10 mg Oral Daily    cefTRIAXone (ROCEPHIN) IV  1 g Intravenous Q24H    azithromycin  500 mg Intravenous Q24H    insulin lispro  0-6 Units Subcutaneous TID WC    insulin lispro  0-3 Units Subcutaneous Nightly     Continuous Infusions:   dextrose       PRN Meds:.sodium chloride flush **AND** sodium chloride flush, glucose, dextrose, glucagon (rDNA), dextrose    RADIOLOGY:  CTA CHEST W CONTRAST   Final Result   1. Very vague ground-glass diffuse pulmonary infiltration suggestive of CHF. 2. 1.5 cm opacity within the left upper lobe noted on axial image 37. This   finding could represent pneumonia.   Follow-up CT scan is recommended in 4-6   weeks to exclude an underlying lesion. 3. Bilateral pleural effusions, left greater than right small in size. 4. Minimal fluid is seen within the right major fissure. 5. 4.2 x 1.9 cm fatty lobule seen along the anterior chest wall. This could   represent focal intercostal fatty hyperplasia or lipoma. RECOMMENDATIONS:   Follow-up unenhanced CT scan of the thorax in 4-6 weeks. CT HEAD WO CONTRAST   Final Result   1. There is no acute intracranial abnormality. Specifically, there is no   intracranial hemorrhage. 2. Atrophy and periventricular leukomalacia,      CT CERVICAL SPINE WO CONTRAST   Final Result   No acute osseous abnormality of the cervical spine. XR CHEST PORTABLE   Final Result   Multifocal bilateral patchy pulmonary infiltrates. XR HIP BILATERAL W AP PELVIS (2 VIEWS)   Final Result   1. There is no right or left hip fracture dislocation   2. Mild degenerative changes of the hips. Vitals:    BP (!) 147/83   Pulse 84   Temp 97.4 °F (36.3 °C) (Oral)   Resp 18   Ht 5' 11\" (1.803 m)   Wt (!) 365 lb 8 oz (165.8 kg)   SpO2 94%   BMI 50.98 kg/m²     LABS:   Recent Labs     11/23/20  0600 11/24/20  0405   WBC 5.1 8.1   HGB 11.8* 11.3*   HCT 41.1 38.1    216     Recent Labs     11/23/20  0600 11/24/20  0405     --  136   K 5.3*   < > 4.7   CL 94*  --  94*   CO2 38*  --  37*   PHOS 5.4*  --   --    BUN 13  --  24*   CREATININE 0.9  --  0.9    < > = values in this interval not displayed. Recent Labs     11/22/20  1555 11/23/20  0600 11/24/20  0405   PROT 7.4 6.4 6.2*   INR 1.1  --   --    APTT 33.0  --   --        ASSESSMENTS:   Active Problems:  1. Venous stasis  2. Pre-ulcerative lesion to right first digit  3. Chronic edema  4. DM II  5.  Lymphedema        Pneumonia    Chronic obstructive pulmonary disease with acute exacerbation (HCC)    Controlled type 2 diabetes mellitus, with long-term current use of insulin (HCC)    Hypoxia    Scrotal edema    Lymphedema    Acute on chronic respiratory failure ([facility] [address]    Fall    COPD exacerbation ([facility] [address]    Uncontrolled type 2 diabetes mellitus with hyperglycemia ([facility] [address]    Dementia without behavioral disturbance (HCC)         PLAN:  - Patient's labs, charts and images were reviewed today   - Wound cultures show heavy growth of staph aureus.  - Antibiotics: azithromycin and rocephin   - Dressings: xeroform, DSD, ACE b/l. Daily dressing changes.  - No immediate podiatric surgical intervention indicated at this time. - Continue local wound care   - Patient is stable for discharge from podiatric standpoint once medically clear. - Patient to follow up with Dr. Olivia Torres in clinic 1 week after discharge     - Discussed patient with Dr. Olivia Torres   - Will continue to follow patient while they are in-house. Thank you for the opportunity to take part in the patient's care. Please do not hesitate to call for any questions or concerns.

## 2020-11-24 NOTE — PROGRESS NOTES
BIPAP alarm going off in pt room; pt had removed BIPAP. States \"I can't wear that. \". Allowed nasal cannula to be put on.

## 2020-11-24 NOTE — CARE COORDINATION
11/24/2020  Social Work Discharge Planning:SW  Discussed JEFFREY choices with spouse and spouse chose either [facility] or [facility]. Referral was made to [facility]. Waiting reply. Electronically signed by WILMAR Bocanegra on 11/24/2020 at 2:20 PM

## 2020-11-24 NOTE — PROGRESS NOTES
Physical Therapy    Facility/Department: [address] INTERNAL MEDICINE 2  Initial Assessment    NAME: Lawrence Norman  : 1945  MRN: [id number]    Date of Service: 2020      Attending Provider:  Melanie Yap DO    Evaluating PT:  Toribio Hsu P.T. Room #:  0891/7666-J  Diagnosis:  PNA, fell at home  Pertinent PMHx/PSHx:  dementia  Precautions:  falls  Equipment Needs: Wide wheeled walker    SUBJECTIVE:    Pt lives with wife in a 1 story home with 2-3 stairs and 1 rail to enter. Pt ambulated with no AD PTA. OBJECTIVE:   Initial Evaluation  Date: 20 Treatment Short Term/ Long Term   Goals   Was pt agreeable to Eval/treatment? yes     Does pt have pain? No c/o pain     Bed Mobility  Rolling: SBA  Supine to sit: MIN A  Sit to supine: MIN A  Scooting: MIN A  Independent    Transfers Sit to stand: MIN A  Stand to sit: MIN A  Stand pivot: MIN A with ww  supervision   Ambulation   15 feet x 2 reps with wide ww MIN A  150 feet with wide ww supervision   Stair negotiation: ascended and descended NA  4 steps with 1 rail SBA   AM-PAC 6 Clicks 98/20       BLE ROM is WFL. BLE strength is grossly 4-/5 to 4/5. Sensation:  Pt denies numbness and tingling to extremities  Balance: sitting is supervision and standing with ww is SBA  Endurance: fair    Patient education  Pt educated on gait with ww    Patient response to education:   Pt verbalized understanding Pt demonstrated skill Pt requires further education in this area   yes yes yes     ASSESSMENT:    Comments:  Pt was in bed and agreeable to PT. He sat EOB and had no c/o light headedness. He was on 3L O2 throughout PT session. Pt stood with wide ww and was steady on his feet. He side stepped 4 steps to R with MIN A and then 1 step back to the bed and sat down. While sitting EOB he reported the need to use BR. He stood up with wide ww and walked to the BR and transferred on/off commode with MIN A using grab bar.   Pt was successful at [facility] minutes  [] Neuromuscular reeducation [id number] ** minutes     Jose Alberto Cardenas., P.T.   License Number: [id number]

## 2020-11-24 NOTE — PROGRESS NOTES
Consult for Bilateral lower leg venous stasis ulcerations and lymphedema   Podiatry following . Dressings and ace wrap ordered. Will defer to them  Olvin Arguello.  Meek Taylor, CNS, Wound Care

## 2020-11-25 ENCOUNTER — APPOINTMENT (OUTPATIENT)
Dept: INTERVENTIONAL RADIOLOGY/VASCULAR | Age: 75
DRG: 291 | End: 2020-11-25
Payer: MEDICARE

## 2020-11-25 LAB
ALBUMIN SERPL-MCNC: 3.5 G/DL (ref 3.5–5.2)
ALP BLD-CCNC: 67 U/L (ref 40–129)
ALT SERPL-CCNC: 32 U/L (ref 0–40)
ANION GAP SERPL CALCULATED.3IONS-SCNC: 6 MMOL/L (ref 7–16)
AST SERPL-CCNC: 27 U/L (ref 0–39)
BASOPHILS ABSOLUTE: 0 E9/L (ref 0–0.2)
BASOPHILS RELATIVE PERCENT: 0 % (ref 0–2)
BILIRUB SERPL-MCNC: 0.3 MG/DL (ref 0–1.2)
BUN BLDV-MCNC: 23 MG/DL (ref 8–23)
CALCIUM SERPL-MCNC: 9.1 MG/DL (ref 8.6–10.2)
CHLORIDE BLD-SCNC: 92 MMOL/L (ref 98–107)
CO2: 39 MMOL/L (ref 22–29)
CREAT SERPL-MCNC: 1 MG/DL (ref 0.7–1.2)
EOSINOPHILS ABSOLUTE: 0.04 E9/L (ref 0.05–0.5)
EOSINOPHILS RELATIVE PERCENT: 0.4 % (ref 0–6)
GFR AFRICAN AMERICAN: >60
GFR NON-AFRICAN AMERICAN: >60 ML/MIN/1.73
GLUCOSE BLD-MCNC: 260 MG/DL (ref 74–99)
HCT VFR BLD CALC: 43 % (ref 37–54)
HEMOGLOBIN: 12.3 G/DL (ref 12.5–16.5)
HYPOCHROMIA: ABNORMAL
IMMATURE GRANULOCYTES #: 0.16 E9/L
IMMATURE GRANULOCYTES %: 1.6 % (ref 0–5)
LYMPHOCYTES ABSOLUTE: 1.01 E9/L (ref 1.5–4)
LYMPHOCYTES RELATIVE PERCENT: 10.1 % (ref 20–42)
MCH RBC QN AUTO: 25.5 PG (ref 26–35)
MCHC RBC AUTO-ENTMCNC: 28.6 % (ref 32–34.5)
MCV RBC AUTO: 89.2 FL (ref 80–99.9)
METER GLUCOSE: 199 MG/DL (ref 74–99)
METER GLUCOSE: 227 MG/DL (ref 74–99)
METER GLUCOSE: 242 MG/DL (ref 74–99)
METER GLUCOSE: 250 MG/DL (ref 74–99)
MONOCYTES ABSOLUTE: 0.81 E9/L (ref 0.1–0.95)
MONOCYTES RELATIVE PERCENT: 8.1 % (ref 2–12)
NEUTROPHILS ABSOLUTE: 7.95 E9/L (ref 1.8–7.3)
NEUTROPHILS RELATIVE PERCENT: 79.8 % (ref 43–80)
PDW BLD-RTO: 15.3 FL (ref 11.5–15)
PLATELET # BLD: 234 E9/L (ref 130–450)
PMV BLD AUTO: 12.1 FL (ref 7–12)
POTASSIUM SERPL-SCNC: 4.5 MMOL/L (ref 3.5–5)
RBC # BLD: 4.82 E12/L (ref 3.8–5.8)
SODIUM BLD-SCNC: 137 MMOL/L (ref 132–146)
TOTAL PROTEIN: 6.9 G/DL (ref 6.4–8.3)
VACUOLATED NEUTROPHILS: ABNORMAL
WBC # BLD: 10 E9/L (ref 4.5–11.5)

## 2020-11-25 PROCEDURE — 6360000002 HC RX W HCPCS: Performed by: INTERNAL MEDICINE

## 2020-11-25 PROCEDURE — 92526 ORAL FUNCTION THERAPY: CPT | Performed by: SPEECH-LANGUAGE PATHOLOGIST

## 2020-11-25 PROCEDURE — 6360000002 HC RX W HCPCS: Performed by: CLINICAL NURSE SPECIALIST

## 2020-11-25 PROCEDURE — 94640 AIRWAY INHALATION TREATMENT: CPT

## 2020-11-25 PROCEDURE — 85025 COMPLETE CBC W/AUTO DIFF WBC: CPT

## 2020-11-25 PROCEDURE — 36415 COLL VENOUS BLD VENIPUNCTURE: CPT

## 2020-11-25 PROCEDURE — 82962 GLUCOSE BLOOD TEST: CPT

## 2020-11-25 PROCEDURE — 6370000000 HC RX 637 (ALT 250 FOR IP): Performed by: NURSE PRACTITIONER

## 2020-11-25 PROCEDURE — 6370000000 HC RX 637 (ALT 250 FOR IP): Performed by: INTERNAL MEDICINE

## 2020-11-25 PROCEDURE — 93923 UPR/LXTR ART STDY 3+ LVLS: CPT

## 2020-11-25 PROCEDURE — 2580000003 HC RX 258: Performed by: INTERNAL MEDICINE

## 2020-11-25 PROCEDURE — 99223 1ST HOSP IP/OBS HIGH 75: CPT | Performed by: INTERNAL MEDICINE

## 2020-11-25 PROCEDURE — 6370000000 HC RX 637 (ALT 250 FOR IP): Performed by: CLINICAL NURSE SPECIALIST

## 2020-11-25 PROCEDURE — 2060000000 HC ICU INTERMEDIATE R&B

## 2020-11-25 PROCEDURE — 80053 COMPREHEN METABOLIC PANEL: CPT

## 2020-11-25 PROCEDURE — 92610 EVALUATE SWALLOWING FUNCTION: CPT | Performed by: SPEECH-LANGUAGE PATHOLOGIST

## 2020-11-25 PROCEDURE — 94660 CPAP INITIATION&MGMT: CPT

## 2020-11-25 PROCEDURE — APPSS60 APP SPLIT SHARED TIME 46-60 MINUTES: Performed by: NURSE PRACTITIONER

## 2020-11-25 PROCEDURE — 2700000000 HC OXYGEN THERAPY PER DAY

## 2020-11-25 RX ORDER — CARVEDILOL 6.25 MG/1
6.25 TABLET ORAL 2 TIMES DAILY WITH MEALS
Status: DISCONTINUED | OUTPATIENT
Start: 2020-11-25 | End: 2020-11-27

## 2020-11-25 RX ADMIN — NYSTATIN 500000 UNITS: 100000 SUSPENSION ORAL at 10:58

## 2020-11-25 RX ADMIN — LISINOPRIL 40 MG: 20 TABLET ORAL at 10:48

## 2020-11-25 RX ADMIN — DOXYCYCLINE HYCLATE 100 MG: 100 CAPSULE ORAL at 10:49

## 2020-11-25 RX ADMIN — ARFORMOTEROL TARTRATE 15 MCG: 15 SOLUTION RESPIRATORY (INHALATION) at 20:55

## 2020-11-25 RX ADMIN — BUDESONIDE 250 MCG: 0.25 SUSPENSION RESPIRATORY (INHALATION) at 20:55

## 2020-11-25 RX ADMIN — Medication 10 ML: at 10:49

## 2020-11-25 RX ADMIN — SODIUM CHLORIDE, PRESERVATIVE FREE 10 ML: 5 INJECTION INTRAVENOUS at 12:45

## 2020-11-25 RX ADMIN — CARVEDILOL 6.25 MG: 6.25 TABLET, FILM COATED ORAL at 10:50

## 2020-11-25 RX ADMIN — MICONAZOLE NITRATE: 20 POWDER TOPICAL at 10:50

## 2020-11-25 RX ADMIN — INSULIN LISPRO 2 UNITS: 100 INJECTION, SOLUTION INTRAVENOUS; SUBCUTANEOUS at 12:45

## 2020-11-25 RX ADMIN — Medication 100 MG: at 12:44

## 2020-11-25 RX ADMIN — ATORVASTATIN CALCIUM 10 MG: 10 TABLET, FILM COATED ORAL at 10:49

## 2020-11-25 RX ADMIN — INSULIN LISPRO 2 UNITS: 100 INJECTION, SOLUTION INTRAVENOUS; SUBCUTANEOUS at 06:28

## 2020-11-25 RX ADMIN — MICONAZOLE NITRATE: 20 POWDER TOPICAL at 20:19

## 2020-11-25 RX ADMIN — ARFORMOTEROL TARTRATE 15 MCG: 15 SOLUTION RESPIRATORY (INHALATION) at 09:07

## 2020-11-25 RX ADMIN — ESCITALOPRAM OXALATE 10 MG: 10 TABLET ORAL at 10:49

## 2020-11-25 RX ADMIN — NYSTATIN 500000 UNITS: 100000 SUSPENSION ORAL at 17:38

## 2020-11-25 RX ADMIN — CARVEDILOL 6.25 MG: 6.25 TABLET, FILM COATED ORAL at 17:45

## 2020-11-25 RX ADMIN — BUDESONIDE 250 MCG: 0.25 SUSPENSION RESPIRATORY (INHALATION) at 09:07

## 2020-11-25 RX ADMIN — NYSTATIN 500000 UNITS: 100000 SUSPENSION ORAL at 12:44

## 2020-11-25 RX ADMIN — NYSTATIN 500000 UNITS: 100000 SUSPENSION ORAL at 20:18

## 2020-11-25 RX ADMIN — FUROSEMIDE 20 MG: 10 INJECTION, SOLUTION INTRAMUSCULAR; INTRAVENOUS at 10:49

## 2020-11-25 RX ADMIN — INSULIN LISPRO 1 UNITS: 100 INJECTION, SOLUTION INTRAVENOUS; SUBCUTANEOUS at 17:39

## 2020-11-25 RX ADMIN — Medication 10 ML: at 20:18

## 2020-11-25 RX ADMIN — DONEPEZIL HYDROCHLORIDE 10 MG: 5 TABLET, FILM COATED ORAL at 20:18

## 2020-11-25 RX ADMIN — INSULIN LISPRO 2 UNITS: 100 INJECTION, SOLUTION INTRAVENOUS; SUBCUTANEOUS at 20:23

## 2020-11-25 RX ADMIN — DOXYCYCLINE HYCLATE 100 MG: 100 CAPSULE ORAL at 20:18

## 2020-11-25 ASSESSMENT — PAIN SCALES - GENERAL
PAINLEVEL_OUTOF10: 0

## 2020-11-25 NOTE — PROGRESS NOTES
Occupational Therapy  Date:11/25/2020  Patient Name: Amy Pickard  Room: 5553/9022-E     Occupational Therapy (OT) order received, patient's medical record reviewed, and OT evaluation attempted this date; patient unavailable due to being off of unit. OT evaluation to be re-attempted at later time/date, as able/appropriate. Massiel López, OTR/L  License Number: [id number]

## 2020-11-25 NOTE — CONSULTS
Inpatient Cardiology Consultation      Reason for Consult: Cardiomyopathy, volume overload    Consulting Physician: Dr. Reid Degree    Requesting Physician:  Dr. Dioni Gandhi    Date of Consultation: 11/25/2020    HISTORY OF PRESENT ILLNESS:   Mr. Niraj Rahman is a 77year old male who is not known to [facility] cardiology physicians. Denies prior stress test, ECHO or cardiac catheterization. PMH: Diabetes mellitus, dementia, COPD (on chronic O2 3-4L NC), former smoker, morbid obesity, Hx of JACEK (noncompliant with BiPAP), HTN, HLD, depression and possible chronic lymphedema. [facility]-ED on 11/22/2020 with complaints of mechanical fall. Patient is a limited historian but stated that he was sitting at the side of his bed watching TV when he slid off\" and fell. He believes he laid on the ground for approximately 1 hour before his wife called for an ambulance. He denied any symptoms prior to the fall. He denies using a walker or cane for ambulation normally and has been in his normal state of health. Per records on admission, he was noted to be ill-appearing and had an audible wheeze, with 1+ lower extremity edema along with scrotal edema. Upon arrival to the ED: Blood pressure 119/94, heart rate 94, afebrile, 93% on 3 L nasal cannula. Labs on admission: Sodium 138, potassium 4.6, BUN 10, creatinine 0.9, CO2 36, blood glucose 212, proBNP 1973 (no prior proBNP for comparison), troponin 0.03 (total ), second troponin  0.02 (total , CK-MB 19.6), third troponin 0.02 (total , CK-MB 16.0). WBC 7.9, H/H stable. Blood cultures: No 24-hour growth. Respiratory panel: Negative. Covid-19: Negative. UA: Rare bacteria. Procalcitonin 0.07. Hemoglobin A1c 8.9.     CTA chest with contrast: Very vague ground glass diffuse pulmonary infiltration suggestive of CHF, 1.5 cm opacity within the left upper lobe, could represent pneumonia, bilateral pleural effusions, left greater than right small in size, minimal fluid is seen within the right major fissure, 4.2 x 1.9 cm fatty lobule seen along the anterior chest.  Patient was started on antibiotics for pneumonia per primary. Wound care consulted for lower extremity venous stasis changes, lymphedema and chronic wounds. Pulmonary was consulted for acute on chronic respiratory failure with hypercapnia and volume overload --> questionable pneumonia. Due to abnormal CT scan of the chest with left-sided pleural-based opacity and small left upper lobe groundglass opacity CT/PET scan as outpatient in 6 to 8 weeks was recommended. Patient was placed on IV Lasix 20 mg daily on 11/23/2020 (responding well with net output -7 L )and cardiology was consulted for cardiomyopathy (LVEF 35-40% on TTE 11/24/2020). Currently, patient is sitting up in bed and appears to be in no acute distress. He remains on 3 L nasal cannula with an oxygen saturation of 95%. Patient remains afebrile and a blood pressure ranging from 141//97. Please note: past medical records were reviewed per electronic medical record (EMR) - see detailed reports under Past Medical/ Surgical History. Past Medical History:    1. COPD with chronic home oxygen (3-4 L). 2.  Former smoker  3. Dementia: On Aricept  4. NIDDM   5.  Morbid obesity: BMI 50.9  6. History of JACEK: Refuses BiPAP  7. Cardiomyopathy: Ischemic versus nonischemic ? · TTE: 11/24/2020: No wall motion abnormality, moderately reduced left ventricular systolic dysfunction, EF 74-13%, indeterminate diastolic function, moderately dilated right ventricle, right ventricle global systolic function is reduced (TAPSE 1.6 cm). Intra-atrial septum appears intact, mild MR, mild TR, mild pulmonary hypertension, RVSP 45 mmHg, no evidence of pericardial effusion. Dilatation of ascending aorta (3.9 cm)  8. Hyperlipidemia: On Lipitor  9. Hypertension (on Norvasc and Benzapril prior to admission)  10.   Depression    Medications Prior to admit:  Prior to Admission medications    Medication Sig Start Date End Date Taking?  Authorizing Provider   metFORMIN (GLUCOPHAGE) 500 MG tablet Take 500 mg by mouth 2 times daily (with meals)   Yes Historical Provider, MD   glimepiride (AMARYL) 2 MG tablet Take 2 mg by mouth every morning (before breakfast)   Yes Historical Provider, MD   amLODIPine (NORVASC) 5 MG tablet Take 5 mg by mouth daily   Yes Historical Provider, MD   donepezil (ARICEPT) 10 MG tablet Take 10 mg by mouth nightly   Yes Historical Provider, MD   lovastatin (MEVACOR) 40 MG tablet Take 40 mg by mouth nightly   Yes Historical Provider, MD   escitalopram (LEXAPRO) 10 MG tablet Take 10 mg by mouth daily   Yes Historical Provider, MD   benazepril (LOTENSIN) 40 MG tablet Take 40 mg by mouth daily   Yes Historical Provider, MD       Current Medications:    Current Facility-Administered Medications: doxycycline hyclate (VIBRAMYCIN) capsule 100 mg, 100 mg, Oral, 2 times per day  sodium chloride flush 0.9 % injection 10 mL, 10 mL, Intravenous, BID **AND** sodium chloride flush 0.9 % injection 10 mL, 10 mL, Intravenous, PRN  furosemide (LASIX) injection 20 mg, 20 mg, Intravenous, Daily  budesonide (PULMICORT) nebulizer suspension 250 mcg, 250 mcg, Nebulization, BID  Arformoterol Tartrate (BROVANA) nebulizer solution 15 mcg, 15 mcg, Nebulization, BID  nystatin (MYCOSTATIN) 936926 UNIT/ML suspension 500,000 Units, 5 mL, Oral, 4x Daily  miconazole (MICOTIN) 2 % powder, , Topical, BID  fluconazole (DIFLUCAN) in 0.9 % sodium chloride IVPB 100 mg, 100 mg, Intravenous, Q24H  amLODIPine (NORVASC) tablet 5 mg, 5 mg, Oral, Daily  donepezil (ARICEPT) tablet 10 mg, 10 mg, Oral, Nightly  lisinopril (PRINIVIL;ZESTRIL) tablet 40 mg, 40 mg, Oral, Daily  escitalopram (LEXAPRO) tablet 10 mg, 10 mg, Oral, Daily  atorvastatin (LIPITOR) tablet 10 mg, 10 mg, Oral, Daily  cefTRIAXone (ROCEPHIN) 1 g in sterile water 10 mL IV syringe, 1 g, Intravenous, Q24H  insulin lispro (HUMALOG) injection vial 0-6 Units, 0-6 Units, Subcutaneous, TID WC  insulin lispro (HUMALOG) injection vial 0-3 Units, 0-3 Units, Subcutaneous, Nightly  glucose (GLUTOSE) 40 % oral gel 15 g, 15 g, Oral, PRN  dextrose 50 % IV solution, 12.5 g, Intravenous, PRN  glucagon (rDNA) injection 1 mg, 1 mg, Intramuscular, PRN  dextrose 5 % solution, 100 mL/hr, Intravenous, PRN    Allergies:  Patient has no known allergies. Social History: Former smoker: 2 PPD x 20 years; quit 30 years ago. Denies alcohol and illicit drug use. Lives with his wife. Family History:   Noncontributory due to advanced age. REVIEW OF SYSTEMS:   Unable to fully assess due to the patient is a limited historian. · Constitutional: +Fatigue. Denies fevers, chills or night sweats  · Eyes: Denies visual changes or drainage  · ENT: Denies headaches or hearing loss. No mouth sores or sore throat. No epistaxis   · Cardiovascular: Denies chest pain, pressure or palpitations. + lower extremity swelling. · Respiratory: + DE SANTIAGO, + dry cough, +orthopnea + PND. No hemoptysis   · Gastrointestinal: Denies hematemesis or anorexia. No hematochezia or melena    · Genitourinary: Denies urgency, dysuria or hematuria. · Musculoskeletal: Denies gait disturbance. Generalized weakness . Denies joint complaints  · Integumentary: Denies rash, hives or pruritis   · Neurological: Denies dizziness, headaches or seizures. No numbness or tingling  · Psychiatric: Denies anxiety or depression. · Endocrine: Denies temperature intolerance. No recent weight change. .  · Hematologic/Lymphatic: Denies abnormal bruising or bleeding. No swollen lymph nodes    PHYSICAL EXAM:   BP (!) 141/85   Pulse 88   Temp 98 °F (36.7 °C) (Oral)   Resp 16   Ht 5' 11\" (1.803 m)   Wt (!) 365 lb 8 oz (165.8 kg)   SpO2 95%   BMI 50.98 kg/m²   CONST:  Well developed, well nourished elderly male who appears of stated age. Awake, alert and cooperative but appears to be confused. No apparent distress.    HEENT: Head- Normocephalic, atraumatic   Eyes- Conjunctivae pink, anicteric  Throat- Oral mucosa pink and moist  Neck-  No stridor, trachea midline, no jugular venous distention. No carotid bruit. CHEST: Chest symmetrical and non-tender to palpation. No accessory muscle use or intercostal retractions  RESPIRATORY: Lung sounds - coarse breath sounds. On supplemental O2 NC 3 L nasal cannula). CARDIOVASCULAR:     Heart Inspection- shows no noted pulsations  Heart Palpation- no heaves or thrills; PMI is non-displaced   Heart Ausculation- Regular rate and rhythm, no murmur. No s3, s4 or rub   PV: + Bilateral lower extremity Ace wraps, chronic lymphedema. No varicosities. Pedal pulses palpable, no clubbing or cyanosis   ABDOMEN: Soft, obese, non-tender to light palpation. Bowel sounds present. No palpable masses no organomegaly; no abdominal bruit  MS: Good muscle strength and tone. No atrophy or abnormal movements. : Simms catheter draining clear yellow urine. SKIN: Warm and dry no statis dermatitis or ulcers   NEURO / PSYCH: Oriented to person only. Patient is awake and alert and able to follow commands. He is sitting up in bed eating breakfast and appears to be confused. DATA:    ECG: See HPI. Tele strips: Sinus rhythm. Diagnostic:      Intake/Output Summary (Last 24 hours) at 11/25/2020 0730  Last data filed at 11/25/2020 0636  Gross per 24 hour   Intake 240 ml   Output 3625 ml   Net -3385 ml       Labs:   CBC:   Recent Labs     11/24/20  0405 11/25/20  0450   WBC 8.1 10.0   HGB 11.3* 12.3*   HCT 38.1 43.0    234     BMP:   Recent Labs     11/24/20  0405 11/25/20  0450    137   K 4.7 4.5   CO2 37* 39*   BUN 24* 23   CREATININE 0.9 1.0   LABGLOM >60 >60   CALCIUM 8.7 9.1     Mag:   Recent Labs     11/23/20  0600   MG 2.1     Phos:   Recent Labs     11/23/20  0600   PHOS 5.4*     TSH: No results for input(s): TSH in the last 72 hours.   HgA1c:   Lab Results   Component Value Date    LABA1C 8.9 (H) 11/23/2020     No results found for: EAG  proBNP:   Recent Labs     11/22/20  0904   PROBNP 1,973*     PT/INR:   Recent Labs     11/22/20  1555   PROTIME 12.1   INR 1.1     APTT:  Recent Labs     11/22/20  1555   APTT 33.0     CARDIAC ENZYMES:  Recent Labs     11/22/20  0904 11/23/20  0010 11/23/20  0600   CKTOTAL 713* 484* 428*   CKMB  --  19.6* 16.0*   TROPONINI 0.03 0.02 0.02     LIVER PROFILE:  Recent Labs     11/24/20  0405 11/25/20  0450   AST 21 27   ALT 25 32   LABALBU 3.1* 3.5       X-ray bilateral hip: 11/22/2020  1. There is no right or left hip fracture dislocation    2. Mild degenerative changes of the hips. Chest x-ray: 11/22/2020 multifocal bilateral patchy pulmonary infiltrates    CT head without contrast: 11/22/2020  1. There is no acute intracranial abnormality.  Specifically, there is no    intracranial hemorrhage. 2. Atrophy and periventricular leukomalacia,      CT cervical spine: 11/22/2020  No acute osseous abnormality of the cervical spine.           CTA chest with contrast: 11/23/2020  1. Very vague ground-glass diffuse pulmonary infiltration suggestive of CHF. 2. 1.5 cm opacity within the left upper lobe noted on axial image 37.  This    finding could represent pneumonia.  Follow-up CT scan is recommended in 4-6    weeks to exclude an underlying lesion. 3. Bilateral pleural effusions, left greater than right small in size. 4. Minimal fluid is seen within the right major fissure. 5. 4.2 x 1.9 cm fatty lobule seen along the anterior chest wall.  This could    represent focal intercostal fatty hyperplasia or lipoma. RECOMMENDATIONS:    Follow-up unenhanced CT scan of the thorax in 4-6 weeks. Assessment:  1. Admitted with mechanical fall   2. Acute combined systolic and diastolic HFrEF: Ischemic vs nonischemic etiology ? Chris Draper  Probable Ischemic etiology with evidence of coronary calcifications on CTA Chest.   · proBNP 1973   · Bilateral pleural effusions L > R on CTA chest.  · Diuresing well. Net output -7 L since admission with no rise in renal function. 3.  Acute on chronic respiratory failure with hypercapnia. 4.  Abnormal CT scan of chest with left-sided pleural-based opacity and small left upper lobe groundglass opacity --pulmonary recommending outpatient CT/PET scan 6 to 8 weeks. 5.  Mild MR, Mild TR, mild pulmonary hypertension on TTE 11/24/2020  6. Hypertension  7. Hyperlipidemia  8. Morbid obesity: BMI 50.9  9. COPD on chronic home oxygen  10. Former smoker: 40 pack years; 30 years ago  6. Dementia  12. NIDDM  13. Wound culture: + Staph aureus  14. ? UTI    Plan:  1. Continue IV diuresis   2. Monitor renal function, strict I/O's, daily weights. 3.  Stop Norvasc   4. Start Coreg 6.25 mg twice daily  5. Continue lisinopril /consider changing to Restrepo-Florian  6. Consider starting Aldactone  7. Consider cardiac catheterization once adequately diuresed. Will discuss with patient and family. 8.  Recommend outpatient sleep evaluation  9. Further recommendations pending above clinical course    Above assessment and plan discussed with Dr. Renetta Price. Electronically signed by IKE Leger CNP on 11/25/20 at 10:22 AM EST    ______________________________________________________________________  Cardiology attending attestation:  I have independently interviewed and examined the patient. I personally reviewed pertinent laboratory values and diagnostic testing (including, if applicable, chest xray, electrocardiogram, telemetry, echocardiogram, stress testing, and coronary angiography). I have reviewed the above documentation completed by IKE Sanchez CNP including past medical, social, and family history and agree with the findings, assessment and plan except where noted. Plan formulated under my direct supervision. I participated in all aspects of the medical decision making.   Please see my additional contributions to the HPI, physical exam, and assessment / medical decision making:  _______________________________________________________________________    Morbidly obese 52-year-old male, dementia, poor historian, lethargic during interview and cannot provide much history. JACEK intolerant to CPAP. Came in with a fall, found to have evidence of respiratory failure and evidence of CHF. Echo showed moderately reduced LV systolic function with an EF of 35 to 40%. He was diuresed 7 L with low-dose furosemide. He denies complaints at this time. CT chest personally reviewed showing severe three-vessel coronary calcification. Physical Exam:  BP (!) 140/75   Pulse 81   Temp 97.5 °F (36.4 °C) (Oral)   Resp 18   Ht 5' 11\" (1.803 m)   Wt (!) 365 lb 8 oz (165.8 kg)   SpO2 97%   BMI 50.98 kg/m²   General appearance: Morbidly obese, lethargic  Skin: Intact, no rash  ENMT: Moist mucous membranes  Neck: Supple, no carotid bruits. Normal jugular venous pressure  Lungs: Diminished  Cardiac: Regular rhythm with a normal rate, normal S1&S2, no murmurs apparent  Abdomen: Soft, positive bowel sounds, nontender  Extremities: Moves all extremities x 4, 1+ lower extremity edema  Neurologic: No focal motor deficits apparent, normal mood and affect    Telemetry: Sinus rhythm  EKG: Sinus rhythm 92 bpm with PACs. Normal axis normal intervals. Nonspecific ST changes. Possible prior septal infarct. Impression:   1. New onset heart failure with reduced ejection fraction  2. Cardiomyopathy EF 35 to 40%, likely ischemic  3. CAD with three-vessel coronary calcification noted on chest CT  4. Morbid obese  5.  Remainder as above    Recommendations:     Continue diuresis, probably transition to oral furosemide tomorrow   Consider ischemic evaluation, depends on CODE STATUS which is not documented or readily apparent from discussion with patient as he continuously falls asleep during evaluation; not sure how functional he is or if he will be a good candidate for any sort of invasive evaluation   Add beta-blocker therapy   Change diet to low-sodium as he was eating sparks this morning   Risk factor modification, sleep apnea treatment    Thank you for the consultation. Please do not hesitate to call with questions.     Kiarra Locke MD, [id number] Cardiology

## 2020-11-25 NOTE — PROGRESS NOTES
Internal Medicine  Progress Note  Marlyn Lanes, MD     Subjective:     Patient is alert. Patient reports OK. Tolerating diet well no other c/o.     Scheduled Meds:   doxycycline hyclate  100 mg Oral 2 times per day    sodium chloride flush  10 mL Intravenous BID    furosemide  20 mg Intravenous Daily    budesonide  250 mcg Nebulization BID    Arformoterol Tartrate  15 mcg Nebulization BID    nystatin  5 mL Oral 4x Daily    miconazole   Topical BID    fluconazole  100 mg Intravenous Q24H    amLODIPine  5 mg Oral Daily    donepezil  10 mg Oral Nightly    lisinopril  40 mg Oral Daily    escitalopram  10 mg Oral Daily    atorvastatin  10 mg Oral Daily    cefTRIAXone (ROCEPHIN) IV  1 g Intravenous Q24H    insulin lispro  0-6 Units Subcutaneous TID WC    insulin lispro  0-3 Units Subcutaneous Nightly     Continuous Infusions:   dextrose       PRN Meds:sodium chloride flush **AND** sodium chloride flush, glucose, dextrose, glucagon (rDNA), dextrose    Objective:      Physical Exam:  Vitals:   BP (!) 141/85   Pulse 88   Temp 98 °F (36.7 °C) (Oral)   Resp 16   Ht 5' 11\" (1.803 m)   Wt (!) 365 lb 8 oz (165.8 kg)   SpO2 95%   BMI 50.98 kg/m²   I/O's    Intake/Output Summary (Last 24 hours) at 11/25/2020 8213  Last data filed at 11/24/2020 2247  Gross per 24 hour   Intake 240 ml   Output 2475 ml   Net -2235 ml     Exam:  General appearance: alert, appears stated age and cooperative  Head: Normocephalic, without obvious abnormality, atraumatic  Eyes: negative  Throat: normal findings: lips normal without lesions  Neck: no adenopathy, no carotid bruit, no JVD and supple, symmetrical, trachea midline  Back: negative  Lungs: clear to auscultation bilaterally  Chest wall: no tenderness  Heart: regular rate and rhythm  Abdomen: soft, non-tender; bowel sounds normal; no masses,  no organomegaly  Extremities: extremities normal, atraumatic, no cyanosis or edema  Pulses: 2+ and symmetric  Skin: Skin color, texture, turgor normal. No rashes or lesions  Lymph nodes: Cervical, supraclavicular, and axillary nodes normal.  Neurologic: Grossly normal      Imaging     Chest  Xray:  No results found for this or any previous visit. Results for orders placed during the hospital encounter of 11/22/20   XR CHEST PORTABLE    Narrative EXAMINATION:  ONE XRAY VIEW OF THE CHEST  11/22/2020 9:45 am  COMPARISON:  None. HISTORY:  ORDERING SYSTEM PROVIDED HISTORY: Shortness of breath  TECHNOLOGIST PROVIDED HISTORY:  Reason for exam:->Shortness of breath  FINDINGS:  The heart is enlarged. Multifocal bilateral patchy ground-glass infiltrates are noted. There is no  pleural effusion. There is no pneumothorax. Impression Multifocal bilateral patchy pulmonary infiltrates.        Additional Imaging:  none    Lab Review   Recent Results (from the past 24 hour(s))   POCT Glucose    Collection Time: 11/24/20  6:37 AM   Result Value Ref Range    Meter Glucose 314 (H) 74 - 99 mg/dL   Procalcitonin    Collection Time: 11/24/20  7:06 AM   Result Value Ref Range    Procalcitonin 0.06 0.00 - 0.08 ng/mL   POCT Glucose    Collection Time: 11/24/20 11:06 AM   Result Value Ref Range    Meter Glucose 284 (H) 74 - 99 mg/dL   Procalcitonin    Collection Time: 11/24/20  1:37 PM   Result Value Ref Range    Procalcitonin 0.06 0.00 - 0.08 ng/mL   POCT Glucose    Collection Time: 11/24/20  4:02 PM   Result Value Ref Range    Meter Glucose 300 (H) 74 - 99 mg/dL   POCT Glucose    Collection Time: 11/24/20  8:26 PM   Result Value Ref Range    Meter Glucose 289 (H) 74 - 99 mg/dL   CBC Auto Differential    Collection Time: 11/25/20  4:50 AM   Result Value Ref Range    WBC 10.0 4.5 - 11.5 E9/L    RBC 4.82 3.80 - 5.80 E12/L    Hemoglobin 12.3 (L) 12.5 - 16.5 g/dL    Hematocrit 43.0 37.0 - 54.0 %    MCV 89.2 80.0 - 99.9 fL    MCH 25.5 (L) 26.0 - 35.0 pg    MCHC 28.6 (L) 32.0 - 34.5 %    RDW 15.3 (H) 11.5 - 15.0 fL    Platelets 568 013 - 710 E9/L    MPV 12.1 (H) 7.0 - 12.0 fL    Vacuolated Neutrophils 1+     Hypochromia 1+    Comprehensive Metabolic Panel    Collection Time: 11/25/20  4:50 AM   Result Value Ref Range    Sodium 137 132 - 146 mmol/L    Potassium 4.5 3.5 - 5.0 mmol/L    Chloride 92 (L) 98 - 107 mmol/L    CO2 39 (H) 22 - 29 mmol/L    Anion Gap 6 (L) 7 - 16 mmol/L    Glucose 260 (H) 74 - 99 mg/dL    BUN 23 8 - 23 mg/dL    CREATININE 1.0 0.7 - 1.2 mg/dL    GFR Non-African American >60 >=60 mL/min/1.73    GFR African American >60     Calcium 9.1 8.6 - 10.2 mg/dL    Total Protein 6.9 6.4 - 8.3 g/dL    Alb 3.5 3.5 - 5.2 g/dL    Total Bilirubin 0.3 0.0 - 1.2 mg/dL    Alkaline Phosphatase 67 40 - 129 U/L    ALT 32 0 - 40 U/L    AST 27 0 - 39 U/L     Assessment:     Active Problems:    Pneumonia    Chronic obstructive pulmonary disease with acute exacerbation (HCC)    Controlled type 2 diabetes mellitus, with long-term current use of insulin (HCC)    Hypoxia    Scrotal edema    Lymphedema    Acute on chronic respiratory failure (HCC)    Fall    COPD exacerbation ([facility] [address]    Uncontrolled type 2 diabetes mellitus with hyperglycemia (HCC)    Dementia without behavioral disturbance (HCC)  Resolved Problems:    * No resolved hospital problems. *      Plan:   Initial COVID R/O pt of Dr Rianna Jewell was (-) X2  [facility] staff asked us if we'd like to assume car and we did  Looks as thouh we got to the party as it was just ending and everyone going home. .. including pt  Procalcitonin (-) looks as joseph maybe was volume overload and not pneumonia  Seems as though he can go real soon when OK with all others involved  Farzad Jim  11/25/2020  6:27 AM

## 2020-11-25 NOTE — CARE COORDINATION
11/25/2020  Social Work Discharge Planning:SW was informed by Misha Padilla that [facility] is able to accept Pt when medically ready,. SW notified nurse. N-17 generated, hens completed and transport form is in chart. Electronically signed by WILMAR Ruiz on 11/25/2020 at 8:26 AM

## 2020-11-25 NOTE — DISCHARGE INSTR - COC
Continuity of Care Form    Patient Name: Adam Alegre   :    MRN:  [id number]    Admit date:  2020  Discharge date:  2020    Code Status Order: No Order   Advance Directives:   Advance Care Flowsheet Documentation       Date/Time Healthcare Directive Type of Healthcare Directive Copy in [address] Agent's Name Healthcare Agent's Phone Number    20 1984  Yes, patient has an advance directive for healthcare treatment  Living will;Health care treatment directive  No, copy requested from [facility] power of   Richelle Barthel  [phone]            Admitting Physician:  Tracie Jordan MD  PCP: Grace Beavers MD    Discharging Nurse: ABELARDO iGllis RN  [address] Unit/Room#: 5164/9073-P  Discharging Unit Phone Number: [phone]    Emergency Contact:   Extended Emergency Contact Information  Primary Emergency Contact: Isa Feliz  Address: [address] [place] [address] Phone: [phone]  Mobile Phone: [phone]  Relation: Spouse    Past Surgical History:  History reviewed. No pertinent surgical history. Immunization History: There is no immunization history on file for this patient. Active Problems:  Patient Active Problem List   Diagnosis Code    Pneumonia J18.9    Chronic obstructive pulmonary disease with acute exacerbation (HCC) J44.1    Controlled type 2 diabetes mellitus, with long-term current use of insulin ([address] E11.9, Z79.4    Hypoxia R09.02    Scrotal edema N50.89    Lymphedema I89.0    Acute on chronic respiratory failure ([address] J96.20    Fall [facility]. [facility]    COPD exacerbation ([address] J44.1    Uncontrolled type 2 diabetes mellitus with hyperglycemia (HCC) E11.65    Dementia without behavioral disturbance (HCC) F03.90       Isolation/Infection:   Isolation            No Isolation          Patient Infection Status       Infection Onset Added Last Indicated Last Indicated By Review Planned Expiration Resolved Resolved By    None active    Resolved    COVID-19 Rule Out 11/22/20 11/22/20 11/22/20 Respiratory Panel, Molecular, with COVID-19 (Restricted: peds pts or suitable admitted adults) (Ordered)   11/22/20 Rule-Out Test Resulted    COVID-19 Rule Out 11/22/20 11/22/20 11/22/20 Respiratory Panel, Molecular, with COVID-19 (Restricted: peds pts or suitable admitted adults) (Ordered)   11/22/20 Rule-Out Test Resulted            Nurse Assessment:  Last Vital Signs: BP (!) 141/85   Pulse 88   Temp 98 °F (36.7 °C) (Oral)   Resp 16   Ht 5' 11\" (1.803 m)   Wt (!) 365 lb 8 oz (165.8 kg)   SpO2 95%   BMI 50.98 kg/m²     Last documented pain score (0-10 scale): Pain Level: 0  Last Weight:   Wt Readings from Last 1 Encounters:   11/24/20 (!) 365 lb 8 oz (165.8 kg)     Mental Status:  disoriented and alert    IV Access:  - None    Nursing Mobility/ADLs:  Walking   Assisted  Transfer  Assisted  Bathing  Assisted  Dressing  Assisted  Toileting  Assisted  Feeding  Independent  Med Admin  Independent  Med Delivery   whole    Wound Care Documentation and Therapy:  Wound 11/23/20 Toe (Comment  which one) Anterior;Right;Plantar dry, off-white center with black surround (Active)   Dressing Status Clean;Dry; Intact 11/24/20 2015   Dressing/Treatment Ace wrap 11/24/20 2015   Wound Length (cm) 1.6 cm 11/23/20 1000   Wound Width (cm) 1.8 cm 11/23/20 1000   Wound Surface Area (cm^2) 2.88 cm^2 11/23/20 1000   Drainage Amount None 11/24/20 2015   Odor None 11/24/20 2015   Lianne-wound Assessment Other (Comment) 11/24/20 2015   Number of days: 1       Wound 11/23/20 Leg Left (Active)   Dressing Status Clean;Dry; Intact 11/24/20 2015   Dressing/Treatment Ace wrap 11/24/20 2015   Wound Assessment Other (Comment) 11/24/20 2015   Drainage Amount None 11/24/20 2015   Lianne-wound Assessment Other (Comment) 11/24/20 2015   Number of days: 1        Elimination:  Continence:   · Bowel: no  · Bladder: no  Urinary Catheter: Removal Date 11/30/2020 [id number]  Colostomy/Ileostomy/Ileal Conduit: No       Date of Last BM: 11/30/2020    Intake/Output Summary (Last 24 hours) at 11/25/2020 0626  Last data filed at 11/24/2020 2247  Gross per 24 hour   Intake 240 ml   Output 2475 ml   Net -2235 ml     I/O last 3 completed shifts: In: 540 [P.O.:540]  Out: 3625 [Urine:3625]    Safety Concerns:     History of Falls (last 30 days)    Impairments/Disabilities:      None    Nutrition Therapy:  Current Nutrition Therapy:   - Oral Diet:  Carb Control 4 carbs/meal (1800kcals/day) and Low Sodium (2gm)    Routes of Feeding: Oral  Liquids: Thin Liquids  Daily Fluid Restriction: yes - amount 2000 ml  Last Modified Barium Swallow with Video (Video Swallowing Test): not done    Treatments at the Time of Hospital Discharge:   Respiratory Treatments: See [facility]  Oxygen Therapy:  is on oxygen at 3 L/min per nasal cannula.   Ventilator:    - No ventilator support    Rehab Therapies: Physical Therapy and Occupational Therapy  Weight Bearing Status/Restrictions: No weight bearing restirctions  Other Medical Equipment (for information only, NOT a DME order):  walker  Other Treatments: bilateral lower dressing changes daily (Xeroform,4x4, kerlix and ace wrap)    Patient's personal belongings (please select all that are sent with patient):  None    RN SIGNATURE:  Electronically signed by Altagracia Flood RN on 11/30/20 at 10:10 AM EST    CASE MANAGEMENT/SOCIAL WORK SECTION    Inpatient Status Date: ***    Readmission Risk Assessment Score:  Readmission Risk              Risk of Unplanned Readmission:        12           Discharging to Facility/ Agency   · Name: [facility] [id number] [facility]  · [address]  · Phone:[phone]  · Fax:[phone]    Dialysis Facility (if applicable)   · Name:  · Address:  · Dialysis Schedule:  · Phone:  · Fax:    / signature: Electronically signed by WILMAR Dillon on 11/25/2020 at 8:28 AM      PHYSICIAN SECTION    Prognosis: Fair    Condition at Discharge: Stable    Rehab Potential (if transferring to Rehab): Fair    Recommended Labs or Other Treatments After Discharge: ***    Physician Certification: I certify the above information and transfer of Rekha Real  is necessary for the continuing treatment of the diagnosis listed and that he requires skilled snf for {LESS:[id number]} 30 days. Update Admission H&P: No change in H&P    PHYSICIAN SIGNATURE:  Electronically signed by Dakotah Mays MD on 11/25/20 at 6:26 AM EST.

## 2020-11-25 NOTE — PROGRESS NOTES
[facility] Pulmonary Progress Note    Admit Date: 2020  Requesting Physician: Ino Calderon MD     SUBJECTIVE:  Pt feels the same, no SOB. Not wearing Bipap. PMH:    Past Medical History:   Diagnosis Date    COPD (chronic obstructive pulmonary disease) ([facility] [address]     Dementia (HCC)     Diabetes mellitus ([facility] [address]      PSH: History reviewed. No pertinent surgical history. Respiratory ROS: no cough, shortness of breath, or wheezing. Otherwise, a complete review of systems is undertaken and is negative. Social History:  · Alcohol:   Social drinker History of Alcohol abuse  · Tobacco: Former heavy smoker  · Employment: no silica or asbestos exposure  Medications:     dextrose        carvedilol  6.25 mg Oral BID WC    doxycycline hyclate  100 mg Oral 2 times per day    sodium chloride flush  10 mL Intravenous BID    furosemide  20 mg Intravenous Daily    budesonide  250 mcg Nebulization BID    Arformoterol Tartrate  15 mcg Nebulization BID    nystatin  5 mL Oral 4x Daily    miconazole   Topical BID    fluconazole  100 mg Intravenous Q24H    donepezil  10 mg Oral Nightly    lisinopril  40 mg Oral Daily    escitalopram  10 mg Oral Daily    atorvastatin  10 mg Oral Daily    cefTRIAXone (ROCEPHIN) IV  1 g Intravenous Q24H    insulin lispro  0-6 Units Subcutaneous TID WC    insulin lispro  0-3 Units Subcutaneous Nightly         Vitals:  Tmax:  VITALS:  BP (!) 140/75   Pulse 81   Temp 97.5 °F (36.4 °C) (Oral)   Resp 18   Ht 5' 11\" (1.803 m)   Wt (!) 365 lb 8 oz (165.8 kg)   SpO2 97%   BMI 50.98 kg/m²   24HR INTAKE/OUTPUT:      Intake/Output Summary (Last 24 hours) at 2020 0957  Last data filed at 2020 0636  Gross per 24 hour   Intake 120 ml   Output 3625 ml   Net -3505 ml     CURRENT PULSE OXIMETRY:  SpO2: 97 %  24HR PULSE OXIMETRY RANGE:  SpO2  Av %  Min: 92 %  Max: 97 %        EXAM:  General: No distress. Alert. Morbid obese  Eyes: PERRL. No sclera icterus.  No conjunctival injection. ENT: No discharge. Pharynx clear. Neck: Trachea midline. Normal thyroid. Resp: No accessory muscle use. Diminished, no wheezing. No crackles. No rhonchi. Decreased breath sounds at bases  CV: Regular rate. Regular rhythm. No mumur or rub. ABD: Non-tender. Non-distended. No masses. No organmegaly. Normal bowel sounds. Skin: Warm and dry. No nodule on exposed extremities. No rash on exposed extremities. Surgical dressing both legs  Lymph: No cervical LAD. No supraclavicular LAD. Ext: No joint deformity. No clubbing. No cyanosis. Lymphedema  Neuro: Awake. Follows commands. Positive pupils/gag/corneals. Normal pain response. Lab Results:  CBC:   Recent Labs     11/23/20  0600 11/24/20  0405 11/25/20  0450   WBC 5.1 8.1 10.0   HGB 11.8* 11.3* 12.3*   HCT 41.1 38.1 43.0   MCV 88.8 86.6 89.2    216 234     BMP:   Recent Labs     11/23/20  0600 11/23/20  1305 11/24/20  0405 11/25/20  0450     --  136 137   K 5.3* 5.0 4.7 4.5   CL 94*  --  94* 92*   CO2 38*  --  37* 39*   PHOS 5.4*  --   --   --    BUN 13  --  24* 23   CREATININE 0.9  --  0.9 1.0      ALB:3,BILIDIR:3,BILITOT:3,ALKPHOS:3)@  PT/INR:   Recent Labs     11/22/20  1555   PROTIME 12.1   INR 1.1     Cultures:  -  Respiratory panel negative  ABG: noted  Films:  CXR : Questionable congestion, body habitus may affect view. CT scan of the chest with contrast: Did not show pulmonary embolism. Showed bilateral mild congestion with groundglass. Left upper lobe groundglass infiltrate. Pleural base left upper aspect of the chest opacity. Bilateral small pleural effusions.   No old films to compare      Assessment:  #1 acute on chronic respiratory failure, hypercapnia  #2 volume overload  #3 less likely to be an infectious process  #4 history of severe COPD  #5 history of AJCEK  #6 history of morbid obesity  #7 abnormal CT scan of the chest with left-sided pleural-based opacity and small left upper lobe groundglass opacity      Plan:  · Procal remains low 0.06,  D/C Rocephin  · Zithromax changed to Doxy  · Diflucan for Candidiasis skin fold  · Lasix IV daily , ProBNP 1973  · Continue Brovana/Pulmicort nebs  · Bipap HS PRN  · CT/PET scan as an outpatient 6 to 8 weeks unless able to find prior CT scan of the chest and  compare.       Tim Centeno CNP    Seen as evaluated, agree with above assessment and plan  Pulmonary will sign off  Okay to be discharged for rehab  Follow-up as an outpatient in 6 to 8 weeks with CT PET scan

## 2020-11-25 NOTE — PROGRESS NOTES
SPEECH/LANGUAGE PATHOLOGY  CLINICAL ASSESSMENT OF SWALLOWING FUNCTION    PATIENT NAME:  Bacilio Avitia      :  1945      TODAY'S DATE:  2020  ROOM:  2733/6118-W    SUMMARY OF EVALUATION    Chart reviewed including chest radiograph/CT. Pt denies swallowing dificulty. DYSPHAGIA DIAGNOSIS:  Difficult to assess bedside due to cough noted before, during and after evaluation. However, consistent cough noted after thin liquid presentations. Recommend video swallow eval to further assess possible aspiration. DIET RECOMMENDATIONS: regular diet, nectar thick liquids until video swallow eval completed to further assess     FEEDING RECOMMENDATIONS:       Assistance level:  No assistance is needed      Compensatory strategies recommended: Small bites/sips    THERAPY RECOMMENDATIONS:        A Video Swallow Study (MBSS) is recommended and requires a physician order                 PROCEDURE     Consistencies Administered During the Evaluation   Liquids: thin liquid and nectar thick liquid   Solids:  pureed foods and solid foods      Method of Intake:   cup, straw, spoon  Self fed, Fed by clinician      Position:   Seated, upright                  RESULTS     Oral Stage: The oral stage of swallowing was within functional limits      Pharyngeal Stage:      Inconsistent throat clearing note throughout evaluation, Immediate wet cough was noted after presentation of thin liquid and Latent wet cough was noted after presentation of all consistencies administered, however cough noted before eval initiated. The Speech Language Pathologist (SLP) completed education with the patient regarding results of evaluation. Explained that Speech Pathology intervention is warranted  at this time   Prognosis for improvements is fair +     This plan will be re-evaluated and revised in 1 week  if warranted.     Patient stated goals: Agreed with above,   Treatment goals discussed with Patient   The Patient understand(s) the diagnosis, prognosis and plan of care         INTERVENTION/EDUCATION    Pt educated on above results and plan of care. Pt trained on compensatory strategies for safe swallow with good outcome. Pt encouraged to engage in question/answer session. All questions answered and pt verbalized understanding of above. CPT code:  98692  bedside swallow eval, 25713 dysphagia therapy 15 mins      [x]The admitting diagnosis and active problem list, as listed below have been reviewed prior to initiation of this evaluation.      ADMITTING DIAGNOSIS: Pneumonia [J18.9]  Pneumonia [J18.9]     ACTIVE PROBLEM LIST:   Patient Active Problem List   Diagnosis    Pneumonia    Chronic obstructive pulmonary disease with acute exacerbation ([address]    Controlled type 2 diabetes mellitus, with long-term current use of insulin ([address]    Hypoxia    Scrotal edema    Lymphedema    Acute on chronic respiratory failure ([address]    Fall    COPD exacerbation ([address]    Uncontrolled type 2 diabetes mellitus with hyperglycemia ([address]    Dementia without behavioral disturbance ([address]

## 2020-11-25 NOTE — PROGRESS NOTES
Spoke with Dr Isai Carlin, states if patient is not agreeable to a peg if needed then Barium swallow not needed.

## 2020-11-26 LAB
ALBUMIN SERPL-MCNC: 3.3 G/DL (ref 3.5–5.2)
ALP BLD-CCNC: 65 U/L (ref 40–129)
ALT SERPL-CCNC: 41 U/L (ref 0–40)
ANION GAP SERPL CALCULATED.3IONS-SCNC: 2 MMOL/L (ref 7–16)
AST SERPL-CCNC: 30 U/L (ref 0–39)
BASOPHILIC STIPPLING: ABNORMAL
BASOPHILS ABSOLUTE: 0.01 E9/L (ref 0–0.2)
BASOPHILS RELATIVE PERCENT: 0.1 % (ref 0–2)
BILIRUB SERPL-MCNC: 0.4 MG/DL (ref 0–1.2)
BUN BLDV-MCNC: 22 MG/DL (ref 8–23)
CALCIUM SERPL-MCNC: 8.9 MG/DL (ref 8.6–10.2)
CHLORIDE BLD-SCNC: 93 MMOL/L (ref 98–107)
CO2: 45 MMOL/L (ref 22–29)
CREAT SERPL-MCNC: 0.9 MG/DL (ref 0.7–1.2)
EOSINOPHILS ABSOLUTE: 0.11 E9/L (ref 0.05–0.5)
EOSINOPHILS RELATIVE PERCENT: 1.1 % (ref 0–6)
GFR AFRICAN AMERICAN: >60
GFR NON-AFRICAN AMERICAN: >60 ML/MIN/1.73
GLUCOSE BLD-MCNC: 183 MG/DL (ref 74–99)
HCT VFR BLD CALC: 44.8 % (ref 37–54)
HEMOGLOBIN: 12.7 G/DL (ref 12.5–16.5)
IMMATURE GRANULOCYTES #: 0.07 E9/L
IMMATURE GRANULOCYTES %: 0.7 % (ref 0–5)
LYMPHOCYTES ABSOLUTE: 1.59 E9/L (ref 1.5–4)
LYMPHOCYTES RELATIVE PERCENT: 16.1 % (ref 20–42)
MCH RBC QN AUTO: 25.3 PG (ref 26–35)
MCHC RBC AUTO-ENTMCNC: 28.3 % (ref 32–34.5)
MCV RBC AUTO: 89.4 FL (ref 80–99.9)
METER GLUCOSE: 174 MG/DL (ref 74–99)
METER GLUCOSE: 227 MG/DL (ref 74–99)
METER GLUCOSE: 237 MG/DL (ref 74–99)
METER GLUCOSE: 243 MG/DL (ref 74–99)
MONOCYTES ABSOLUTE: 1.19 E9/L (ref 0.1–0.95)
MONOCYTES RELATIVE PERCENT: 12 % (ref 2–12)
NEUTROPHILS ABSOLUTE: 6.91 E9/L (ref 1.8–7.3)
NEUTROPHILS RELATIVE PERCENT: 70 % (ref 43–80)
ORGANISM: ABNORMAL
OVALOCYTES: ABNORMAL
PDW BLD-RTO: 15.3 FL (ref 11.5–15)
PLATELET # BLD: 195 E9/L (ref 130–450)
PMV BLD AUTO: 11.6 FL (ref 7–12)
POIKILOCYTES: ABNORMAL
POLYCHROMASIA: ABNORMAL
POTASSIUM SERPL-SCNC: 4.1 MMOL/L (ref 3.5–5)
RBC # BLD: 5.01 E12/L (ref 3.8–5.8)
SODIUM BLD-SCNC: 140 MMOL/L (ref 132–146)
TEAR DROP CELLS: ABNORMAL
TOTAL PROTEIN: 6.2 G/DL (ref 6.4–8.3)
WBC # BLD: 9.9 E9/L (ref 4.5–11.5)
WOUND/ABSCESS: ABNORMAL
WOUND/ABSCESS: ABNORMAL

## 2020-11-26 PROCEDURE — 2060000000 HC ICU INTERMEDIATE R&B

## 2020-11-26 PROCEDURE — 94640 AIRWAY INHALATION TREATMENT: CPT

## 2020-11-26 PROCEDURE — 36415 COLL VENOUS BLD VENIPUNCTURE: CPT

## 2020-11-26 PROCEDURE — 6370000000 HC RX 637 (ALT 250 FOR IP): Performed by: CLINICAL NURSE SPECIALIST

## 2020-11-26 PROCEDURE — 2580000003 HC RX 258: Performed by: INTERNAL MEDICINE

## 2020-11-26 PROCEDURE — 82962 GLUCOSE BLOOD TEST: CPT

## 2020-11-26 PROCEDURE — 6360000002 HC RX W HCPCS: Performed by: CLINICAL NURSE SPECIALIST

## 2020-11-26 PROCEDURE — 6370000000 HC RX 637 (ALT 250 FOR IP): Performed by: NURSE PRACTITIONER

## 2020-11-26 PROCEDURE — 80053 COMPREHEN METABOLIC PANEL: CPT

## 2020-11-26 PROCEDURE — 6370000000 HC RX 637 (ALT 250 FOR IP): Performed by: INTERNAL MEDICINE

## 2020-11-26 PROCEDURE — 85025 COMPLETE CBC W/AUTO DIFF WBC: CPT

## 2020-11-26 PROCEDURE — 94660 CPAP INITIATION&MGMT: CPT

## 2020-11-26 PROCEDURE — 6360000002 HC RX W HCPCS: Performed by: INTERNAL MEDICINE

## 2020-11-26 PROCEDURE — 99233 SBSQ HOSP IP/OBS HIGH 50: CPT | Performed by: INTERNAL MEDICINE

## 2020-11-26 PROCEDURE — 2700000000 HC OXYGEN THERAPY PER DAY

## 2020-11-26 RX ORDER — ASPIRIN 81 MG/1
81 TABLET ORAL DAILY
Status: DISCONTINUED | OUTPATIENT
Start: 2020-11-26 | End: 2020-11-30 | Stop reason: HOSPADM

## 2020-11-26 RX ORDER — ATORVASTATIN CALCIUM 20 MG/1
20 TABLET, FILM COATED ORAL DAILY
Status: DISCONTINUED | OUTPATIENT
Start: 2020-11-26 | End: 2020-11-30 | Stop reason: HOSPADM

## 2020-11-26 RX ADMIN — Medication 10 ML: at 09:39

## 2020-11-26 RX ADMIN — ASPIRIN 81 MG: 81 TABLET, COATED ORAL at 09:38

## 2020-11-26 RX ADMIN — ARFORMOTEROL TARTRATE 15 MCG: 15 SOLUTION RESPIRATORY (INHALATION) at 08:22

## 2020-11-26 RX ADMIN — NYSTATIN 500000 UNITS: 100000 SUSPENSION ORAL at 21:59

## 2020-11-26 RX ADMIN — DOXYCYCLINE HYCLATE 100 MG: 100 CAPSULE ORAL at 09:38

## 2020-11-26 RX ADMIN — INSULIN LISPRO 2 UNITS: 100 INJECTION, SOLUTION INTRAVENOUS; SUBCUTANEOUS at 12:00

## 2020-11-26 RX ADMIN — CARVEDILOL 6.25 MG: 6.25 TABLET, FILM COATED ORAL at 12:00

## 2020-11-26 RX ADMIN — MICONAZOLE NITRATE: 20 POWDER TOPICAL at 21:57

## 2020-11-26 RX ADMIN — NYSTATIN 500000 UNITS: 100000 SUSPENSION ORAL at 09:39

## 2020-11-26 RX ADMIN — BUDESONIDE 250 MCG: 0.25 SUSPENSION RESPIRATORY (INHALATION) at 08:22

## 2020-11-26 RX ADMIN — DOXYCYCLINE HYCLATE 100 MG: 100 CAPSULE ORAL at 22:00

## 2020-11-26 RX ADMIN — LISINOPRIL 40 MG: 20 TABLET ORAL at 09:38

## 2020-11-26 RX ADMIN — ESCITALOPRAM OXALATE 10 MG: 10 TABLET ORAL at 09:38

## 2020-11-26 RX ADMIN — BUDESONIDE 250 MCG: 0.25 SUSPENSION RESPIRATORY (INHALATION) at 19:46

## 2020-11-26 RX ADMIN — INSULIN LISPRO 1 UNITS: 100 INJECTION, SOLUTION INTRAVENOUS; SUBCUTANEOUS at 22:01

## 2020-11-26 RX ADMIN — INSULIN LISPRO 2 UNITS: 100 INJECTION, SOLUTION INTRAVENOUS; SUBCUTANEOUS at 18:06

## 2020-11-26 RX ADMIN — ARFORMOTEROL TARTRATE 15 MCG: 15 SOLUTION RESPIRATORY (INHALATION) at 19:46

## 2020-11-26 RX ADMIN — MICONAZOLE NITRATE: 20 POWDER TOPICAL at 09:40

## 2020-11-26 RX ADMIN — DONEPEZIL HYDROCHLORIDE 10 MG: 5 TABLET, FILM COATED ORAL at 22:04

## 2020-11-26 RX ADMIN — Medication 100 MG: at 13:00

## 2020-11-26 RX ADMIN — Medication 10 ML: at 21:59

## 2020-11-26 RX ADMIN — INSULIN LISPRO 1 UNITS: 100 INJECTION, SOLUTION INTRAVENOUS; SUBCUTANEOUS at 06:32

## 2020-11-26 RX ADMIN — CARVEDILOL 6.25 MG: 6.25 TABLET, FILM COATED ORAL at 18:05

## 2020-11-26 RX ADMIN — ATORVASTATIN CALCIUM 20 MG: 20 TABLET, FILM COATED ORAL at 09:38

## 2020-11-26 ASSESSMENT — PAIN SCALES - GENERAL: PAINLEVEL_OUTOF10: 0

## 2020-11-26 NOTE — PLAN OF CARE
Problem: Falls - Risk of:  Goal: Will remain free from falls  Description: Will remain free from falls  11/25/2020 2156 by Emmie Novak RN  Outcome: Met This Shift  11/25/2020 2012 by Nba Mcdermott RN  Outcome: Met This Shift  Goal: Absence of physical injury  Description: Absence of physical injury  11/25/2020 2156 by Emmie Novak RN  Outcome: Met This Shift  11/25/2020 2012 by Nba Mcdermott RN  Outcome: Met This Shift     Problem: Skin Integrity:  Goal: Will show no infection signs and symptoms  Description: Will show no infection signs and symptoms  11/25/2020 2156 by Emmie Novak RN  Outcome: Met This Shift  11/25/2020 2012 by Nba Mcdermott RN  Outcome: Met This Shift  Goal: Absence of new skin breakdown  Description: Absence of new skin breakdown  11/25/2020 2156 by Emmie Novak RN  Outcome: Met This Shift  11/25/2020 2012 by Nba Mcdermott RN  Outcome: Met This Shift     Problem: Confusion - Acute:  Goal: Absence of continued neurological deterioration signs and symptoms  Description: Absence of continued neurological deterioration signs and symptoms  Outcome: Met This Shift  Goal: Mental status will be restored to baseline  Description: Mental status will be restored to baseline  Outcome: Met This Shift     Problem: Discharge Planning:  Goal: Ability to perform activities of daily living will improve  Description: Ability to perform activities of daily living will improve  Outcome: Met This Shift  Goal: Participates in care planning  Description: Participates in care planning  Outcome: Met This Shift     Problem: Injury - Risk of, Physical Injury:  Goal: Will remain free from falls  Description: Will remain free from falls  11/25/2020 2156 by Emmie Novak RN  Outcome: Met This Shift  11/25/2020 2012 by Nba Mcdermott RN  Outcome: Met This Shift  Goal: Absence of physical injury  Description: Absence of physical injury  11/25/2020 2156 by Emmie Novak RN  Outcome: Met This Shift  11/25/2020 2012 by Mihaela Noonan RN  Outcome: Met This Shift     Problem: Mood - Altered:  Goal: Mood stable  Description: Mood stable  Outcome: Met This Shift  Goal: Absence of abusive behavior  Description: Absence of abusive behavior  Outcome: Met This Shift  Goal: Verbalizations of feeling emotionally comfortable while being cared for will increase  Description: Verbalizations of feeling emotionally comfortable while being cared for will increase  Outcome: Met This Shift     Problem: Psychomotor Activity - Altered:  Goal: Absence of psychomotor disturbance signs and symptoms  Description: Absence of psychomotor disturbance signs and symptoms  Outcome: Met This Shift     Problem: Sensory Perception - Impaired:  Goal: Demonstrations of improved sensory functioning will increase  Description: Demonstrations of improved sensory functioning will increase  Outcome: Met This Shift  Goal: Decrease in sensory misperception frequency  Description: Decrease in sensory misperception frequency  Outcome: Met This Shift  Goal: Able to refrain from responding to false sensory perceptions  Description: Able to refrain from responding to false sensory perceptions  Outcome: Met This Shift  Goal: Demonstrates accurate environmental perceptions  Description: Demonstrates accurate environmental perceptions  Outcome: Met This Shift  Goal: Able to distinguish between reality-based and nonreality-based thinking  Description: Able to distinguish between reality-based and nonreality-based thinking  Outcome: Met This Shift  Goal: Able to interrupt nonreality-based thinking  Description: Able to interrupt nonreality-based thinking  Outcome: Met This Shift     Problem: Sleep Pattern Disturbance:  Goal: Appears well-rested  Description: Appears well-rested  Outcome: Met This Shift     Problem:  Activity:  Goal: Ability to tolerate increased activity will improve  Description: Ability to tolerate increased activity will improve  Outcome: Met This Shift     Problem: Cardiac:  Goal: Hemodynamic stability will improve  Description: Hemodynamic stability will improve  Outcome: Met This Shift  Goal: Complications related to the disease process, condition or treatment will be avoided or minimized  Description: Complications related to the disease process, condition or treatment will be avoided or minimized  Outcome: Met This Shift  Goal: Cerebral tissue perfusion will improve  Description: Cerebral tissue perfusion will improve  Outcome: Met This Shift     Problem: Coping:  Goal: Ability to identify and develop effective coping behavior will improve  Description: Ability to identify and develop effective coping behavior will improve  Outcome: Met This Shift     Problem: Health Behavior:  Goal: Identification of resources available to assist in meeting health care needs will improve  Description: Identification of resources available to assist in meeting health care needs will improve  Outcome: Met This Shift     Problem: Nutritional:  Goal: Ability to identify appropriate dietary choices will improve  Description: Ability to identify appropriate dietary choices will improve  Outcome: Met This Shift     Problem: Airway Clearance - Ineffective:  Goal: Ability to maintain a clear airway will improve  Description: Ability to maintain a clear airway will improve  Outcome: Met This Shift

## 2020-11-26 NOTE — PROGRESS NOTES
INPATIENT CARDIOLOGY FOLLOW-UP    Name: Adam Alegre    Age: 77 y.o. Date of Admission: 11/22/2020  8:26 AM    Date of Service: 11/26/2020    Primary Cardiologist: Known to me from this admission    Chief Complaint: Follow-up for CHF, cardiomyopathy    Interim History:  States feeling okay. Denies chest pain or shortness of breath. Diuresing with a 2.7 L, total net -10 L.   Serum bicarb is up to 45 today    Review of Systems:   Negative except as described above    Problem List:  Patient Active Problem List   Diagnosis    Pneumonia    [facility] obstructive [facility] disease [facility]ation [facility]    Controlled type 2 diabetes mellitus, with long-term current use of insulin ([facility]    Hypoxia    Scrotal edema    Lymphedema    Acute on chronic respiratory failure ([facility] [facility]    Fall    COPD exacerbation ([facility] [facility]    Uncontrolled type 2 diabetes mellitus with hyperglycemia ([facility] [address] [facility]    [facility]       Current Medications:    Current Facility-Administered Medications:     aspirin EC tablet 81 mg, 81 mg, Oral, Daily, Mynor Sam MD    atorvastatin (LIPITOR) tablet 20 mg, 20 mg, Oral, Daily, Mynor Sam MD    carvedilol (COREG) tablet 6.25 mg, 6.25 mg, Oral, BID SUSAN, IKE Carson - CNP, 6.25 mg at 11/25/20 1745    doxycycline hyclate (VIBRAMYCIN) capsule 100 mg, 100 mg, Oral, 2 times per day, Dimitris Baker MD, 100 mg at 11/25/20 2018    sodium chloride flush 0.9 % injection 10 mL, 10 mL, Intravenous, BID, 10 mL at 11/25/20 2018 **AND** sodium chloride flush 0.9 % injection 10 mL, 10 mL, Intravenous, PRN, Jose Alberto Hric, DO, 10 mL at 11/25/20 1245    budesonide (PULMICORT) nebulizer suspension 250 mcg, 250 mcg, Nebulization, BID, Dimitris Baker MD, 250 mcg at 11/26/20 5780    Arformoterol Tartrate (BROVANA) nebulizer solution 15 mcg, 15 mcg, Nebulization, BID, Dimitris Baker MD, 15 mcg at 11/26/20 2317    nystatin (MYCOSTATIN) 353559 UNIT/ML suspension 500,000 Units, 5 mL, Oral, 4x Daily, Modesta Lin APRN - CNS, 500,000 Units at 11/25/20 2018    miconazole (MICOTIN) 2 % powder, , Topical, BID, Modesta Lin APRBECKY - CNS    fluconazole (DIFLUCAN) in 0.9 % sodium chloride IVPB 100 mg, 100 mg, Intravenous, Q24H, Modesta Lin APRN - CNS, Last Rate: 100 mL/hr at 11/25/20 1244, 100 mg at 11/25/20 1244    donepezil (ARICEPT) tablet 10 mg, 10 mg, Oral, Nightly, Modesta Lin APRN - CNS, 10 mg at 11/25/20 2018    lisinopril (PRINIVIL;ZESTRIL) tablet 40 mg, 40 mg, Oral, Daily, Modesta Lin APRN - CNS, 40 mg at 11/25/20 1048    escitalopram (LEXAPRO) tablet 10 mg, 10 mg, Oral, Daily, Modesta LinIKE - CNS, 10 mg at 11/25/20 1049    insulin lispro (HUMALOG) injection vial 0-6 Units, 0-6 Units, Subcutaneous, TID WC, Jose Alberto Hric, DO, 1 Units at 11/26/20 4911    insulin lispro (HUMALOG) injection vial 0-3 Units, 0-3 Units, Subcutaneous, Nightly, Jose Alberto Hric, DO, 2 Units at 11/25/20 2023    glucose (GLUTOSE) 40 % oral gel 15 g, 15 g, Oral, PRN, Jose Alberto Hric, DO    dextrose 50 % IV solution, 12.5 g, Intravenous, PRN, Jose Alberto Hric, DO    glucagon (rDNA) injection 1 mg, 1 mg, Intramuscular, PRN, Jose Alberto Hric, DO    dextrose 5 % solution, 100 mL/hr, Intravenous, PRN, Jose Alberto Hric, DO    Physical Exam:  BP (!) 149/93   Pulse 72   Temp 98.1 °F (36.7 °C) (Oral)   Resp 16   Ht 5' 11\" (1.803 m)   Wt (!) 352 lb 1.6 oz (159.7 kg)   SpO2 95%   BMI 49.11 kg/m²   Wt Readings from Last 3 Encounters:   11/26/20 (!) 352 lb 1.6 oz (159.7 kg)     Appearance:  Morbidly obese male, awake, alert, no acute respiratory distress, on nasal cannula  Skin: Intact, no rash  Head: Normocephalic, atraumatic  Eyes: EOMI, no conjunctival erythema  ENMT: No pharyngeal erythema, MMM, no rhinorrhea  Neck: Supple, no elevated JVP, no carotid bruits  Lungs: Diminished  Cardiac: PMI nondisplaced, Regular rhythm with a normal rate, S1 & S2 normal, no murmurs  Abdomen: Soft, nontender, +bowel sounds  Extremities: Moves all extremities x 4, trace lower extremity edema  Neurologic: No focal motor deficits apparent, normal mood and affect  Peripheral Pulses: Intact posterior tibial pulses bilaterally    Intake/Output:    Intake/Output Summary (Last 24 hours) at 11/26/2020 0849  Last data filed at 11/25/2020 2203  Gross per 24 hour   Intake 300 ml   Output 2950 ml   Net -2650 ml     No intake/output data recorded. Laboratory Tests:  Recent Labs     11/24/20  0405 11/25/20  0450 11/26/20  0655    137 140   K 4.7 4.5 4.1   CL 94* 92* 93*   CO2 37* 39* 45*   BUN 24* 23 22   CREATININE 0.9 1.0 0.9   GLUCOSE 361* 260* 183*   CALCIUM 8.7 9.1 8.9     Lab Results   Component Value Date    MG 2.1 11/23/2020     Recent Labs     11/24/20 0405 11/25/20  0450 11/26/20  0655   ALKPHOS 63 67 65   ALT 25 32 41*   AST 21 27 30   PROT 6.2* 6.9 6.2*   BILITOT 0.2 0.3 0.4   LABALBU 3.1* 3.5 3.3*     Recent Labs     11/24/20 0405 11/25/20  0450 11/26/20  0655   WBC 8.1 10.0 9.9   RBC 4.40 4.82 5.01   HGB 11.3* 12.3* 12.7   HCT 38.1 43.0 44.8   MCV 86.6 89.2 89.4   MCH 25.7* 25.5* 25.3*   MCHC 29.7* 28.6* 28.3*   RDW 15.4* 15.3* 15.3*    234 195   MPV 12.3* 12.1* 11.6     Lab Results   Component Value Date    CKTOTAL 428 (H) 11/23/2020    CKMB 16.0 (H) 11/23/2020    TROPONINI 0.02 11/23/2020    TROPONINI 0.02 11/23/2020    TROPONINI 0.03 11/22/2020     Lab Results   Component Value Date    INR 1.1 11/22/2020    PROTIME 12.1 11/22/2020     No results found for: TSHFT4, TSH  Lab Results   Component Value Date    LABA1C 8.9 (H) 11/23/2020       Cardiac Tests:    EKG: Sinus rhythm 92 bpm with PACs. Normal axis normal intervals. Nonspecific ST changes. Possible prior septal infarct. Telemetry: Sinus rhythm 80s with PACs and PVCs    CTA chest: 11/23/2020, personally reviewed demonstrating three-vessel coronary calcification  Impression:          1.  Very vague ground-glass diffuse pulmonary infiltration suggestive of CHF. 2. 1.5 cm opacity within the left upper lobe noted on axial image 37.  This   finding could represent pneumonia.  Follow-up CT scan is recommended in 4-6   weeks to exclude an underlying lesion. 3. Bilateral pleural effusions, left greater than right small in size. 4. Minimal fluid is seen within the right major fissure. 5. 4.2 x 1.9 cm fatty lobule seen along the anterior chest wall.  This could   represent focal intercostal fatty hyperplasia or lipoma. RECOMMENDATIONS:   Follow-up unenhanced CT scan of the thorax in 4-6 weeks. Echocardiogram:   11/24/2020     Summary   Technically suboptimal and limited study. Mildly dilated left ventricle. Micro-bubble contrast injected to enhance left ventricular visualization. No regional wall motion abnormalities seen. Moderately reduced left ventricular systolic dysfunction, EF 35 to 40%   The left atrium is borderline dilated. Moderately dilated right ventricle. Right ventricle global systolic function is reduced . Mildly enlarged right atrium size. Mild mitral annular calcification. Mild mitral regurgitation is present. The aortic valve appears moderately sclerotic. Mild tricuspid regurgitation. Pulmonary hypertension is mild . Dilation of the ascending aorta. Stress test:      Cardiac catheterization:     ----------------------------------------------------------------------------------------------------------------------------------------------------------------  IMPRESSION:  1. New onset acute heart failure with reduced ejection fraction. Improving, net -10 L. 2. Cardiomyopathy, EF 35 to 40% likely ischemic  3. Coronary artery disease with three-vessel coronary calcification noted on chest CT  4. Metabolic alkalosis from diuresis  5. Acute on chronic hypoxic/hypercapnic respiratory failure  6. Left-sided pleural opacity in left upper lobe groundglass opacity  7.  Morbid obesity, BMI 50 kg/m²  8. Poorly controlled diabetes, A1c 8.9%  9. Hypertension, running high  10. Dementia  11. JACEK      RECOMMENDATIONS:     Discontinue IV furosemide   Poor candidate for left heart catheterization   Pharmacologic stress test tomorrow for risk stratification   Increase carvedilol to 12.5 twice daily   Continue lisinopril   Add low-dose aspirin and increase atorvastatin   Aggressive risk factor modification, weight loss, sleep apnea treatment    Jerod Messina MD, [facility] Cardiology    NOTE: This report was transcribed using voice recognition software. Every effort was made to ensure accuracy; however, inadvertent computerized transcription errors may be present.

## 2020-11-26 NOTE — PROGRESS NOTES
lesion. No noted tunneling, active bleeding or purulence expressed. Does not probe to bone. No noted edema or erythema surrounding the wound. Pitting edema noted diffusely to the LEs L>R. Hyperpigmentation noted to the LLE and ankle of RLE. Superficial abrasion with minor drainage noted         MUSCULOSKELETAL:  4/5 Gross Muscle strength in all 4 quadrants. No posterior calf pain b/l        Wound Care:   Wound #1: right plantar first digit    Size - 0.5x0.5x0.2cm    Appearance - stable/ pre-ulcerative    Drainage - None   Odor - None        CONSULTS:  IP CONSULT TO PRIMARY CARE PROVIDER  IP CONSULT TO PULMONOLOGY  IP CONSULT TO IV TEAM  IP CONSULT TO PODIATRY  IP CONSULT TO SOCIAL WORK  IP CONSULT TO CARDIOLOGY    MEDICATION:  Scheduled Meds:   carvedilol  6.25 mg Oral BID WC    doxycycline hyclate  100 mg Oral 2 times per day    sodium chloride flush  10 mL Intravenous BID    furosemide  20 mg Intravenous Daily    budesonide  250 mcg Nebulization BID    Arformoterol Tartrate  15 mcg Nebulization BID    nystatin  5 mL Oral 4x Daily    miconazole   Topical BID    fluconazole  100 mg Intravenous Q24H    donepezil  10 mg Oral Nightly    lisinopril  40 mg Oral Daily    escitalopram  10 mg Oral Daily    atorvastatin  10 mg Oral Daily    insulin lispro  0-6 Units Subcutaneous TID WC    insulin lispro  0-3 Units Subcutaneous Nightly     Continuous Infusions:   dextrose       PRN Meds:.sodium chloride flush **AND** sodium chloride flush, glucose, dextrose, glucagon (rDNA), dextrose    RADIOLOGY:  VL LOWER EXTREMITY ARTERIAL SEGMENTAL PRESSURES W PPG BILATERAL   Final Result   1. Normal left ankle brachial index measuring 1. 14. The right PARAS could not   be calculated due to heart noncompressible vessels. Toe brachial indices   also could not be calculated. 2. Monophasic waveforms at the anterior tibial level bilaterally.   Pulse   volume recordings are unremarkable apart from mildly blunted waveforms at the   ankle level bilaterally. 3. Toe waveforms are normal apart from moderate blunting in the right great   toe and severe blunting in the left 2nd toe. 4. If clinically indicated, duplex arterial sonography could be done for   further evaluation. CTA CHEST W CONTRAST   Final Result   1. Very vague ground-glass diffuse pulmonary infiltration suggestive of CHF. 2. 1.5 cm opacity within the left upper lobe noted on axial image 37. This   finding could represent pneumonia. Follow-up CT scan is recommended in 4-6   weeks to exclude an underlying lesion. 3. Bilateral pleural effusions, left greater than right small in size. 4. Minimal fluid is seen within the right major fissure. 5. 4.2 x 1.9 cm fatty lobule seen along the anterior chest wall. This could   represent focal intercostal fatty hyperplasia or lipoma. RECOMMENDATIONS:   Follow-up unenhanced CT scan of the thorax in 4-6 weeks. CT HEAD WO CONTRAST   Final Result   1. There is no acute intracranial abnormality. Specifically, there is no   intracranial hemorrhage. 2. Atrophy and periventricular leukomalacia,      CT CERVICAL SPINE WO CONTRAST   Final Result   No acute osseous abnormality of the cervical spine. XR CHEST PORTABLE   Final Result   Multifocal bilateral patchy pulmonary infiltrates. XR HIP BILATERAL W AP PELVIS (2 VIEWS)   Final Result   1. There is no right or left hip fracture dislocation   2. Mild degenerative changes of the hips.           Vitals:    BP (!) 158/88   Pulse 77   Temp 98.6 °F (37 °C) (Oral)   Resp 20   Ht 5' 11\" (1.803 m)   Wt (!) 365 lb 8 oz (165.8 kg)   SpO2 97%   BMI 50.98 kg/m²     LABS:   Recent Labs     11/24/20  0405 11/25/20  0450   WBC 8.1 10.0   HGB 11.3* 12.3*   HCT 38.1 43.0    234     Recent Labs     11/23/20  0600  11/25/20  0450      < > 137   K 5.3*   < > 4.5   CL 94*   < > 92*   CO2 38*   < > 39*   PHOS 5.4*  --   --    BUN 13   < > 23   CREATININE 0.9   < > 1.0    < > = values in this interval not displayed. Recent Labs     11/24/20  0405 11/25/20  0450   PROT 6.2* 6.9       ASSESSMENTS:   Active Problems:  1. Venous stasis  2. Pre-ulcerative lesion to right first digit  3. Chronic edema  4. DM II  5. Lymphedema        Pneumonia    Chronic obstructive pulmonary disease with acute exacerbation (HCC)    Controlled type 2 diabetes mellitus, with long-term current use of insulin (HCC)    Hypoxia    Scrotal edema    Lymphedema    Acute on chronic respiratory failure (HCC)    Fall    COPD exacerbation ([facility] [address]    Uncontrolled type 2 diabetes mellitus with hyperglycemia ([facility] [address]    Dementia without behavioral disturbance (HCC)         PLAN:  - Patient's labs, charts and images were reviewed today   - Wound cultures show heavy growth of staph aureus.  - Antibiotics: azithromycin and rocephin   - NIVS reviewed: Monophasic waveforms at the anterior tibial level bilaterally. PVRs are unremarkable apart from mildly blunted waveforms at the ankle level bilaterally. - Consult placed to vascular surgery   - Dressings: xeroform, DSD, ACE b/l. Daily dressing changes.  - No immediate podiatric surgical intervention indicated at this time. - Continue local wound care   - Patient is stable for discharge from podiatric standpoint once medically clear. - Patient to follow up with Dr. Enrique Jeter in clinic 1 week after discharge     - Discussed patient with Dr. Enrique Jeter   - Will continue to follow patient while they are in-house. Thank you for the opportunity to take part in the patient's care. Please do not hesitate to call for any questions or concerns.

## 2020-11-26 NOTE — PROGRESS NOTES
bleeding or purulence expressed. Does not probe to bone. No noted edema or erythema surrounding the wound. Pitting edema noted diffusely to the LEs L>R. Hyperpigmentation noted to the LLE and ankle of RLE. Superficial abrasion with minor drainage noted         MUSCULOSKELETAL:  4/5 Gross Muscle strength in all 4 quadrants. No posterior calf pain b/l        Wound Care:   Wound #1: right plantar first digit    Size - 0.5x0.5x0.2cm    Appearance - stable/ pre-ulcerative    Drainage - None   Odor - None        CONSULTS:  IP CONSULT TO PRIMARY CARE PROVIDER  IP CONSULT TO PULMONOLOGY  IP CONSULT TO IV TEAM  IP CONSULT TO PODIATRY  IP CONSULT TO SOCIAL WORK  IP CONSULT TO CARDIOLOGY  IP CONSULT TO VASCULAR SURGERY  IP CONSULT TO IV TEAM    MEDICATION:  Scheduled Meds:   aspirin  81 mg Oral Daily    atorvastatin  20 mg Oral Daily    carvedilol  6.25 mg Oral BID WC    doxycycline hyclate  100 mg Oral 2 times per day    sodium chloride flush  10 mL Intravenous BID    budesonide  250 mcg Nebulization BID    Arformoterol Tartrate  15 mcg Nebulization BID    nystatin  5 mL Oral 4x Daily    miconazole   Topical BID    fluconazole  100 mg Intravenous Q24H    donepezil  10 mg Oral Nightly    lisinopril  40 mg Oral Daily    escitalopram  10 mg Oral Daily    insulin lispro  0-6 Units Subcutaneous TID WC    insulin lispro  0-3 Units Subcutaneous Nightly     Continuous Infusions:   dextrose       PRN Meds:.regadenoson, sodium chloride flush **AND** sodium chloride flush, glucose, dextrose, glucagon (rDNA), dextrose    RADIOLOGY:  VL LOWER EXTREMITY ARTERIAL SEGMENTAL PRESSURES W PPG BILATERAL   Final Result   1. Normal left ankle brachial index measuring 1. 14. The right PARAS could not   be calculated due to heart noncompressible vessels. Toe brachial indices   also could not be calculated. 2. Monophasic waveforms at the anterior tibial level bilaterally.   Pulse   volume recordings are unremarkable apart from mildly blunted waveforms at the   ankle level bilaterally. 3. Toe waveforms are normal apart from moderate blunting in the right great   toe and severe blunting in the left 2nd toe. 4. If clinically indicated, duplex arterial sonography could be done for   further evaluation. CTA CHEST W CONTRAST   Final Result   1. Very vague ground-glass diffuse pulmonary infiltration suggestive of CHF. 2. 1.5 cm opacity within the left upper lobe noted on axial image 37. This   finding could represent pneumonia. Follow-up CT scan is recommended in 4-6   weeks to exclude an underlying lesion. 3. Bilateral pleural effusions, left greater than right small in size. 4. Minimal fluid is seen within the right major fissure. 5. 4.2 x 1.9 cm fatty lobule seen along the anterior chest wall. This could   represent focal intercostal fatty hyperplasia or lipoma. RECOMMENDATIONS:   Follow-up unenhanced CT scan of the thorax in 4-6 weeks. CT HEAD WO CONTRAST   Final Result   1. There is no acute intracranial abnormality. Specifically, there is no   intracranial hemorrhage. 2. Atrophy and periventricular leukomalacia,      CT CERVICAL SPINE WO CONTRAST   Final Result   No acute osseous abnormality of the cervical spine. XR CHEST PORTABLE   Final Result   Multifocal bilateral patchy pulmonary infiltrates. XR HIP BILATERAL W AP PELVIS (2 VIEWS)   Final Result   1. There is no right or left hip fracture dislocation   2. Mild degenerative changes of the hips.       NM Cardiac Stress Test Nuclear Imaging    (Results Pending)       Vitals:    /77   Pulse 87   Temp 97.7 °F (36.5 °C) (Oral)   Resp 18   Ht 5' 11\" (1.803 m)   Wt (!) 352 lb 1.6 oz (159.7 kg)   SpO2 95%   BMI 49.11 kg/m²     LABS:   Recent Labs     11/25/20  0450 11/26/20  0655   WBC 10.0 9.9   HGB 12.3* 12.7   HCT 43.0 44.8    195     Recent Labs     11/26/20  0655      K 4.1   CL 93*   CO2 45*   BUN 22   CREATININE 0.9 Recent Labs     11/25/20  0450 11/26/20  0655   PROT 6.9 6.2*       ASSESSMENTS:   Active Problems:  1. Venous stasis  2. Pre-ulcerative lesion to right first digit  3. Chronic edema  4. DM II  5. Lymphedema        Pneumonia    Chronic obstructive pulmonary disease with acute exacerbation (HCC)    Controlled type 2 diabetes mellitus, with long-term current use of insulin (HCC)    Hypoxia    Scrotal edema    Lymphedema    Acute on chronic respiratory failure (HCC)    Fall    COPD exacerbation ([facility] [address]    Uncontrolled type [facility] mellitus [facility]    Dementia without behavioral disturbance ([facility])         PLAN:  - Patient's labs, charts and images were reviewed today   - Wound cultures show heavy growth of staph aureus.  - Antibiotics: azithromycin and rocephin   - Consult placed to vascular surgery, appreciate input   - Dressings: xeroform, DSD, ACE b/l. Daily dressing changes.  - No immediate podiatric surgical intervention indicated at this time. - Continue local wound care   - Patient is stable for discharge from podiatric standpoint once medically clear. - Patient to follow up with Dr. Murguia Parents in clinic 1 week after discharge     - Discussed patient with Dr. Murguia Parents   - Will continue to follow patient while they are in-house. Thank you for the opportunity to take part in the patient's care. Please do not hesitate to call for any questions or concerns.

## 2020-11-26 NOTE — PROGRESS NOTES
Internal Medicine  Progress Note  Jaimee Pretty MD     Subjective:     Patient is alert. Patient reports OK. Tolerating diet well no other c/o. Scheduled Meds:   aspirin  81 mg Oral Daily    atorvastatin  20 mg Oral Daily    carvedilol  6.25 mg Oral BID WC    doxycycline hyclate  100 mg Oral 2 times per day    sodium chloride flush  10 mL Intravenous BID    furosemide  20 mg Intravenous Daily    budesonide  250 mcg Nebulization BID    Arformoterol Tartrate  15 mcg Nebulization BID    nystatin  5 mL Oral 4x Daily    miconazole   Topical BID    fluconazole  100 mg Intravenous Q24H    donepezil  10 mg Oral Nightly    lisinopril  40 mg Oral Daily    escitalopram  10 mg Oral Daily    insulin lispro  0-6 Units Subcutaneous TID WC    insulin lispro  0-3 Units Subcutaneous Nightly     Continuous Infusions:   dextrose       PRN Meds:sodium chloride flush **AND** sodium chloride flush, glucose, dextrose, glucagon (rDNA), dextrose    Objective:      Physical Exam:  Vitals:   BP (!) 149/93   Pulse 72   Temp 98.1 °F (36.7 °C) (Oral)   Resp 16   Ht 5' 11\" (1.803 m)   Wt (!) 352 lb 1.6 oz (159.7 kg)   SpO2 93%   BMI 49.11 kg/m²   I/O's    Intake/Output Summary (Last 24 hours) at 11/26/2020 2163  Last data filed at 11/25/2020 2203  Gross per 24 hour   Intake 300 ml   Output 2950 ml   Net -2650 ml     Exam:  General appearance: alert, appears stated age and cooperative  Head: Normocephalic, without obvious abnormality, atraumatic  Eyes: conjunctivae/corneas clear. PERRL, EOM's intact. Fundi benign. Throat: normal findings: lips normal without lesions  Neck: no adenopathy, no carotid bruit, no JVD and supple, symmetrical, trachea midline  Back: symmetric, no curvature. ROM normal. No CVA tenderness.   Lungs: clear to auscultation bilaterally  Chest wall: no tenderness  Heart: regular rate and rhythm  Abdomen: soft, non-tender; bowel sounds normal; no masses,  no organomegaly  Extremities: extremities normal, atraumatic, no cyanosis or edema  Pulses: 2+ and symmetric  Skin: Skin color, texture, turgor normal. No rashes or lesions  Lymph nodes: Cervical, supraclavicular, and axillary nodes normal.  Neurologic: Grossly normal      Imaging     Chest  Xray:  No results found for this or any previous visit. Results for orders placed during the hospital encounter of 11/22/20   XR CHEST PORTABLE    Narrative EXAMINATION:  ONE XRAY VIEW OF THE CHEST  11/22/2020 9:45 am  COMPARISON:  None. HISTORY:  ORDERING SYSTEM PROVIDED HISTORY: Shortness of breath  TECHNOLOGIST PROVIDED HISTORY:  Reason for exam:->Shortness of breath  FINDINGS:  The heart is enlarged. Multifocal bilateral patchy ground-glass infiltrates are noted. There is no  pleural effusion. There is no pneumothorax. Impression Multifocal bilateral patchy pulmonary infiltrates. Additional Imaging:  none    Lab Review   Recent Results (from the past 24 hour(s))   POCT Glucose    Collection Time: 11/25/20 11:00 AM   Result Value Ref Range    Meter Glucose 242 (H) 74 - 99 mg/dL   POCT Glucose    Collection Time: 11/25/20  4:43 PM   Result Value Ref Range    Meter Glucose 199 (H) 74 - 99 mg/dL   POCT Glucose    Collection Time: 11/25/20  8:17 PM   Result Value Ref Range    Meter Glucose 250 (H) 74 - 99 mg/dL   POCT Glucose    Collection Time: 11/26/20  6:05 AM   Result Value Ref Range    Meter Glucose 174 (H) 74 - 99 mg/dL     Assessment:     Active Problems:    Pneumonia    Chronic obstructive pulmonary disease with acute exacerbation (HCC)    Controlled type 2 diabetes mellitus, with long-term current use of insulin (HCC)    Hypoxia    Scrotal edema    Lymphedema    Acute on chronic respiratory failure (HCC)    Fall    COPD exacerbation ([facility]    Uncontrolled type 2 diabetes mellitus with hyperglycemia (HCC)    Dementia without behavioral disturbance (HCC)  Resolved Problems:    * No resolved hospital problems.  *      Plan:   Looks better   Cath per cardio when stable  Josefina Brush  11/26/2020  7:02 AM

## 2020-11-27 ENCOUNTER — APPOINTMENT (OUTPATIENT)
Dept: NON INVASIVE DIAGNOSTICS | Age: 75
DRG: 291 | End: 2020-11-27
Payer: MEDICARE

## 2020-11-27 ENCOUNTER — APPOINTMENT (OUTPATIENT)
Dept: NUCLEAR MEDICINE | Age: 75
DRG: 291 | End: 2020-11-27
Payer: MEDICARE

## 2020-11-27 ENCOUNTER — APPOINTMENT (OUTPATIENT)
Dept: GENERAL RADIOLOGY | Age: 75
DRG: 291 | End: 2020-11-27
Payer: MEDICARE

## 2020-11-27 LAB
ALBUMIN SERPL-MCNC: 3.1 G/DL (ref 3.5–5.2)
ALP BLD-CCNC: 64 U/L (ref 40–129)
ALT SERPL-CCNC: 32 U/L (ref 0–40)
ANION GAP SERPL CALCULATED.3IONS-SCNC: 3 MMOL/L (ref 7–16)
AST SERPL-CCNC: 23 U/L (ref 0–39)
BASOPHILS ABSOLUTE: 0.01 E9/L (ref 0–0.2)
BASOPHILS RELATIVE PERCENT: 0.1 % (ref 0–2)
BILIRUB SERPL-MCNC: 0.5 MG/DL (ref 0–1.2)
BLOOD CULTURE, ROUTINE: NORMAL
BUN BLDV-MCNC: 15 MG/DL (ref 8–23)
CALCIUM SERPL-MCNC: 8.9 MG/DL (ref 8.6–10.2)
CHLORIDE BLD-SCNC: 94 MMOL/L (ref 98–107)
CO2: 41 MMOL/L (ref 22–29)
CREAT SERPL-MCNC: 0.7 MG/DL (ref 0.7–1.2)
CULTURE, BLOOD 2: NORMAL
EOSINOPHILS ABSOLUTE: 0.27 E9/L (ref 0.05–0.5)
EOSINOPHILS RELATIVE PERCENT: 4 % (ref 0–6)
GFR AFRICAN AMERICAN: >60
GFR NON-AFRICAN AMERICAN: >60 ML/MIN/1.73
GLUCOSE BLD-MCNC: 250 MG/DL (ref 74–99)
HCT VFR BLD CALC: 43 % (ref 37–54)
HEMOGLOBIN: 12.7 G/DL (ref 12.5–16.5)
IMMATURE GRANULOCYTES #: 0.04 E9/L
IMMATURE GRANULOCYTES %: 0.6 % (ref 0–5)
LV EF: 46 %
LVEF MODALITY: NORMAL
LYMPHOCYTES ABSOLUTE: 0.92 E9/L (ref 1.5–4)
LYMPHOCYTES RELATIVE PERCENT: 13.5 % (ref 20–42)
MCH RBC QN AUTO: 25.9 PG (ref 26–35)
MCHC RBC AUTO-ENTMCNC: 29.5 % (ref 32–34.5)
MCV RBC AUTO: 87.8 FL (ref 80–99.9)
METER GLUCOSE: 204 MG/DL (ref 74–99)
METER GLUCOSE: 207 MG/DL (ref 74–99)
METER GLUCOSE: 208 MG/DL (ref 74–99)
METER GLUCOSE: 213 MG/DL (ref 74–99)
MONOCYTES ABSOLUTE: 0.67 E9/L (ref 0.1–0.95)
MONOCYTES RELATIVE PERCENT: 9.9 % (ref 2–12)
NEUTROPHILS ABSOLUTE: 4.88 E9/L (ref 1.8–7.3)
NEUTROPHILS RELATIVE PERCENT: 71.9 % (ref 43–80)
PDW BLD-RTO: 15 FL (ref 11.5–15)
PLATELET # BLD: 194 E9/L (ref 130–450)
PMV BLD AUTO: 13 FL (ref 7–12)
POTASSIUM SERPL-SCNC: 4.7 MMOL/L (ref 3.5–5)
PRO-BNP: 2294 PG/ML (ref 0–450)
RBC # BLD: 4.9 E12/L (ref 3.8–5.8)
SODIUM BLD-SCNC: 138 MMOL/L (ref 132–146)
TOTAL PROTEIN: 6 G/DL (ref 6.4–8.3)
WBC # BLD: 6.8 E9/L (ref 4.5–11.5)

## 2020-11-27 PROCEDURE — 2060000000 HC ICU INTERMEDIATE R&B

## 2020-11-27 PROCEDURE — 36415 COLL VENOUS BLD VENIPUNCTURE: CPT

## 2020-11-27 PROCEDURE — A9500 TC99M SESTAMIBI: HCPCS | Performed by: RADIOLOGY

## 2020-11-27 PROCEDURE — 99233 SBSQ HOSP IP/OBS HIGH 50: CPT | Performed by: INTERNAL MEDICINE

## 2020-11-27 PROCEDURE — 80053 COMPREHEN METABOLIC PANEL: CPT

## 2020-11-27 PROCEDURE — 93016 CV STRESS TEST SUPVJ ONLY: CPT | Performed by: INTERNAL MEDICINE

## 2020-11-27 PROCEDURE — 2700000000 HC OXYGEN THERAPY PER DAY

## 2020-11-27 PROCEDURE — 94640 AIRWAY INHALATION TREATMENT: CPT

## 2020-11-27 PROCEDURE — 71045 X-RAY EXAM CHEST 1 VIEW: CPT

## 2020-11-27 PROCEDURE — 6360000002 HC RX W HCPCS: Performed by: INTERNAL MEDICINE

## 2020-11-27 PROCEDURE — 93018 CV STRESS TEST I&R ONLY: CPT | Performed by: INTERNAL MEDICINE

## 2020-11-27 PROCEDURE — 2580000003 HC RX 258: Performed by: INTERNAL MEDICINE

## 2020-11-27 PROCEDURE — 85025 COMPLETE CBC W/AUTO DIFF WBC: CPT

## 2020-11-27 PROCEDURE — 78452 HT MUSCLE IMAGE SPECT MULT: CPT

## 2020-11-27 PROCEDURE — 93017 CV STRESS TEST TRACING ONLY: CPT

## 2020-11-27 PROCEDURE — 6370000000 HC RX 637 (ALT 250 FOR IP): Performed by: NURSE PRACTITIONER

## 2020-11-27 PROCEDURE — 6370000000 HC RX 637 (ALT 250 FOR IP): Performed by: CLINICAL NURSE SPECIALIST

## 2020-11-27 PROCEDURE — 97165 OT EVAL LOW COMPLEX 30 MIN: CPT

## 2020-11-27 PROCEDURE — 6370000000 HC RX 637 (ALT 250 FOR IP): Performed by: INTERNAL MEDICINE

## 2020-11-27 PROCEDURE — 82962 GLUCOSE BLOOD TEST: CPT

## 2020-11-27 PROCEDURE — 97530 THERAPEUTIC ACTIVITIES: CPT

## 2020-11-27 PROCEDURE — 83880 ASSAY OF NATRIURETIC PEPTIDE: CPT

## 2020-11-27 PROCEDURE — 94660 CPAP INITIATION&MGMT: CPT

## 2020-11-27 PROCEDURE — 3430000000 HC RX DIAGNOSTIC RADIOPHARMACEUTICAL: Performed by: RADIOLOGY

## 2020-11-27 PROCEDURE — 6360000002 HC RX W HCPCS: Performed by: CLINICAL NURSE SPECIALIST

## 2020-11-27 RX ORDER — ACETAZOLAMIDE 500 MG/5ML
500 INJECTION, POWDER, LYOPHILIZED, FOR SOLUTION INTRAVENOUS ONCE
Status: DISCONTINUED | OUTPATIENT
Start: 2020-11-27 | End: 2020-11-27 | Stop reason: RX

## 2020-11-27 RX ORDER — CARVEDILOL 6.25 MG/1
12.5 TABLET ORAL 2 TIMES DAILY WITH MEALS
Status: DISCONTINUED | OUTPATIENT
Start: 2020-11-28 | End: 2020-11-30 | Stop reason: HOSPADM

## 2020-11-27 RX ORDER — ACETAZOLAMIDE 250 MG/1
500 TABLET ORAL ONCE
Status: COMPLETED | OUTPATIENT
Start: 2020-11-27 | End: 2020-11-27

## 2020-11-27 RX ADMIN — ARFORMOTEROL TARTRATE 15 MCG: 15 SOLUTION RESPIRATORY (INHALATION) at 20:46

## 2020-11-27 RX ADMIN — MICONAZOLE NITRATE: 20 POWDER TOPICAL at 13:12

## 2020-11-27 RX ADMIN — INSULIN LISPRO 1 UNITS: 100 INJECTION, SOLUTION INTRAVENOUS; SUBCUTANEOUS at 06:58

## 2020-11-27 RX ADMIN — DOXYCYCLINE HYCLATE 100 MG: 100 CAPSULE ORAL at 21:27

## 2020-11-27 RX ADMIN — Medication 10 ML: at 13:11

## 2020-11-27 RX ADMIN — SODIUM CHLORIDE, PRESERVATIVE FREE 10 ML: 5 INJECTION INTRAVENOUS at 13:43

## 2020-11-27 RX ADMIN — DONEPEZIL HYDROCHLORIDE 10 MG: 5 TABLET, FILM COATED ORAL at 21:27

## 2020-11-27 RX ADMIN — Medication 10 ML: at 21:27

## 2020-11-27 RX ADMIN — CARVEDILOL 6.25 MG: 6.25 TABLET, FILM COATED ORAL at 13:10

## 2020-11-27 RX ADMIN — BUDESONIDE 250 MCG: 0.25 SUSPENSION RESPIRATORY (INHALATION) at 20:46

## 2020-11-27 RX ADMIN — MICONAZOLE NITRATE: 20 POWDER TOPICAL at 21:27

## 2020-11-27 RX ADMIN — ASPIRIN 81 MG: 81 TABLET, COATED ORAL at 13:12

## 2020-11-27 RX ADMIN — Medication 100 MG: at 13:11

## 2020-11-27 RX ADMIN — INSULIN LISPRO 2 UNITS: 100 INJECTION, SOLUTION INTRAVENOUS; SUBCUTANEOUS at 13:13

## 2020-11-27 RX ADMIN — Medication 10 MILLICURIE: at 09:00

## 2020-11-27 RX ADMIN — INSULIN LISPRO 2 UNITS: 100 INJECTION, SOLUTION INTRAVENOUS; SUBCUTANEOUS at 18:25

## 2020-11-27 RX ADMIN — INSULIN LISPRO 1 UNITS: 100 INJECTION, SOLUTION INTRAVENOUS; SUBCUTANEOUS at 21:26

## 2020-11-27 RX ADMIN — ESCITALOPRAM OXALATE 10 MG: 10 TABLET ORAL at 13:10

## 2020-11-27 RX ADMIN — NYSTATIN 500000 UNITS: 100000 SUSPENSION ORAL at 21:27

## 2020-11-27 RX ADMIN — ATORVASTATIN CALCIUM 20 MG: 20 TABLET, FILM COATED ORAL at 13:10

## 2020-11-27 RX ADMIN — DOXYCYCLINE HYCLATE 100 MG: 100 CAPSULE ORAL at 13:10

## 2020-11-27 RX ADMIN — REGADENOSON 0.4 MG: 0.08 INJECTION, SOLUTION INTRAVENOUS at 10:30

## 2020-11-27 RX ADMIN — LISINOPRIL 40 MG: 20 TABLET ORAL at 13:10

## 2020-11-27 RX ADMIN — ACETAZOLAMIDE 500 MG: 250 TABLET ORAL at 21:27

## 2020-11-27 RX ADMIN — Medication 30 MILLICURIE: at 10:30

## 2020-11-27 ASSESSMENT — PAIN SCALES - PAIN ASSESSMENT IN ADVANCED DEMENTIA (PAINAD)
NEGVOCALIZATION: 0
TOTALSCORE: 0
BODYLANGUAGE: 0
CONSOLABILITY: 0
BREATHING: 0
FACIALEXPRESSION: 0

## 2020-11-27 ASSESSMENT — PAIN SCALES - GENERAL
PAINLEVEL_OUTOF10: 0
PAINLEVEL_OUTOF10: 0

## 2020-11-27 NOTE — PROGRESS NOTES
Date: 11/26/2020    Time: 9:35 PM    Patient Placed On BIPAP/CPAP/ Non-Invasive Ventilation? No    If no must comment. Facial area red/color change? No           If YES are Blister/Lesion present? No   If yes must notify nursing staff  BIPAP/CPAP skin barrier? No    Skin barrier type:n/a       Comments: Patient refusing Bipap machine. Machine in room if needed.         Denzel Galindo

## 2020-11-27 NOTE — PROGRESS NOTES
Internal Medicine  Progress Note  Mauricio Jaquez MD     Subjective:     Patient is asleep. Tolerating diet well no other c/o. Scheduled Meds:   aspirin  81 mg Oral Daily    atorvastatin  20 mg Oral Daily    carvedilol  6.25 mg Oral BID WC    doxycycline hyclate  100 mg Oral 2 times per day    sodium chloride flush  10 mL Intravenous BID    budesonide  250 mcg Nebulization BID    Arformoterol Tartrate  15 mcg Nebulization BID    nystatin  5 mL Oral 4x Daily    miconazole   Topical BID    fluconazole  100 mg Intravenous Q24H    donepezil  10 mg Oral Nightly    lisinopril  40 mg Oral Daily    escitalopram  10 mg Oral Daily    insulin lispro  0-6 Units Subcutaneous TID WC    insulin lispro  0-3 Units Subcutaneous Nightly     Continuous Infusions:   dextrose       PRN Meds:regadenoson, sodium chloride flush **AND** sodium chloride flush, glucose, dextrose, glucagon (rDNA), dextrose    Objective:      Physical Exam:  Vitals:   BP (!) 176/88   Pulse 66   Temp 97.9 °F (36.6 °C) (Oral)   Resp 16   Ht 5' 11\" (1.803 m)   Wt (!) 354 lb 12.8 oz (160.9 kg)   SpO2 96%   BMI 49.48 kg/m²   I/O's    Intake/Output Summary (Last 24 hours) at 11/27/2020 2532  Last data filed at 11/27/2020 3963  Gross per 24 hour   Intake 120 ml   Output 2850 ml   Net -2730 ml     Exam:  General appearance: appears stated age, cooperative and no distress  Head: Normocephalic, without obvious abnormality, atraumatic  Eyes: conjunctivae/corneas clear. PERRL, EOM's intact. Fundi benign.   Throat: lips, mucosa, and tongue normal; teeth and gums normal  Neck: no adenopathy, no carotid bruit, no JVD and supple, symmetrical, trachea midline  Back: negative  Lungs: clear to auscultation bilaterally  Chest wall: no tenderness  Heart: regular rate and rhythm  Abdomen: soft, non-tender; bowel sounds normal; no masses,  no organomegaly  Extremities: extremities normal, atraumatic, no cyanosis or edema  Pulses: 2+ and symmetric  Skin:

## 2020-11-27 NOTE — PROGRESS NOTES
carotid bruits  Lungs: Diminished  Cardiac: PMI nondisplaced, Regular rhythm with a normal rate, S1 & S2 normal, no murmurs  Abdomen: Soft, nontender, +bowel sounds  Extremities: Moves all extremities x 4, trace lower extremity edema  Neurologic: No focal motor deficits apparent, normal mood and affect  Peripheral Pulses: Intact posterior tibial pulses bilaterally    Intake/Output:    Intake/Output Summary (Last 24 hours) at 11/27/2020 0738  Last data filed at 11/27/2020 0333  Gross per 24 hour   Intake 120 ml   Output 2850 ml   Net -2730 ml     No intake/output data recorded. Laboratory Tests:  Recent Labs     11/25/20  0450 11/26/20  0655    140   K 4.5 4.1   CL 92* 93*   CO2 39* 45*   BUN 23 22   CREATININE 1.0 0.9   GLUCOSE 260* 183*   CALCIUM 9.1 8.9     Lab Results   Component Value Date    MG 2.1 11/23/2020     Recent Labs     11/25/20  0450 11/26/20  0655   ALKPHOS 67 65   ALT 32 41*   AST 27 30   PROT 6.9 6.2*   BILITOT 0.3 0.4   LABALBU 3.5 3.3*     Recent Labs     11/25/20  0450 11/26/20  0655   WBC 10.0 9.9   RBC 4.82 5.01   HGB 12.3* 12.7   HCT 43.0 44.8   MCV 89.2 89.4   MCH 25.5* 25.3*   MCHC 28.6* 28.3*   RDW 15.3* 15.3*    195   MPV 12.1* 11.6     Lab Results   Component Value Date    CKTOTAL 428 (H) 11/23/2020    CKMB 16.0 (H) 11/23/2020    TROPONINI 0.02 11/23/2020    TROPONINI 0.02 11/23/2020    TROPONINI 0.03 11/22/2020     Lab Results   Component Value Date    INR 1.1 11/22/2020    PROTIME 12.1 11/22/2020     No results found for: TSHFT4, TSH  Lab Results   Component Value Date    LABA1C 8.9 (H) 11/23/2020       Cardiac Tests:    EKG: Sinus rhythm 92 bpm with PACs. Normal axis normal intervals. Nonspecific ST changes. Possible prior septal infarct. Telemetry: Sinus rhythm 80s with PACs and PVCs    CTA chest: 11/23/2020, personally reviewed demonstrating three-vessel coronary calcification  Impression:          1.  Very vague ground-glass diffuse pulmonary infiltration suggestive of CHF. 2. 1.5 cm opacity within the left upper lobe noted on axial image 37.  This   finding could represent pneumonia.  Follow-up CT scan is recommended in 4-6   weeks to exclude an underlying lesion. 3. Bilateral pleural effusions, left greater than right small in size. 4. Minimal fluid is seen within the right major fissure. 5. 4.2 x 1.9 cm fatty lobule seen along the anterior chest wall.  This could   represent focal intercostal fatty hyperplasia or lipoma. RECOMMENDATIONS:   Follow-up unenhanced CT scan of the thorax in 4-6 weeks. Echocardiogram:   11/24/2020     Summary   Technically suboptimal and limited study. Mildly dilated left ventricle. Micro-bubble contrast injected to enhance left ventricular visualization. No regional wall motion abnormalities seen. Moderately reduced left ventricular systolic dysfunction, EF 35 to 40%   The left atrium is borderline dilated. Moderately dilated right ventricle. Right ventricle global systolic function is reduced . Mildly enlarged right atrium size. Mild mitral annular calcification. Mild mitral regurgitation is present. The aortic valve appears moderately sclerotic. Mild tricuspid regurgitation. Pulmonary hypertension is mild . Dilation of the ascending aorta. Stress test:      Cardiac catheterization:     ----------------------------------------------------------------------------------------------------------------------------------------------------------------  IMPRESSION:  1. New onset acute heart failure with reduced ejection fraction. Improving, net -10 L. 2. Cardiomyopathy, EF 35 to 40% likely ischemic  3. Coronary artery disease with three-vessel coronary calcification noted on chest CT  4. Metabolic alkalosis from diuresis  5. Acute on chronic hypoxic/hypercapnic respiratory failure  6. Left-sided pleural opacity in left upper lobe groundglass opacity  7. Morbid obesity, BMI 50 kg/m²  8.  Poorly controlled diabetes, A1c 8.9%  9. Hypertension, running high  10. Dementia  11. JACEK      RECOMMENDATIONS:     Diuretics on hold   Blood work pending, may need some acetazolamide   Poor candidate for left heart catheterization   Pharmacologic stress test today for risk stratification   Increase carvedilol to 12.5 twice daily   Continue lisinopril   Continue low-dose aspirin and increase atorvastatin   Aggressive risk factor modification, weight loss, sleep apnea treatment    Sydnee Clifford MD, [facility]    NOTE: This report was transcribed using voice recognition software. Every effort was made to ensure accuracy; however, inadvertent computerized transcription errors may be present.

## 2020-11-27 NOTE — PROGRESS NOTES
Physical Therapy    Pt NA this AM, off the floor at testing. Will follow on another date/time.     Arthmadhav Serrano PTA [id number]

## 2020-11-27 NOTE — PROGRESS NOTES
Spoke with wife, Michael Evans via phone for consent for stress test due to intermitent confusion.  Pt transferred to stress test at 830 am

## 2020-11-27 NOTE — PROGRESS NOTES
Department of Podiatry   Progress Note        Patient seen for venous stasis and stage 0 wound of the digit. Patient is resting comfortably and appears in no acute distress. Awakens to verbal stimuli. Denies n/v/f/c/d/sob/cp. No new pedal complaints. Past Medical History:        Diagnosis Date    COPD (chronic obstructive pulmonary disease) ([facility]     Dementia ([facility]     Diabetes mellitus ([facility]        Past Surgical History:    History reviewed. No pertinent surgical history. Medications Prior to Admission:    Medications Prior to Admission: metFORMIN (GLUCOPHAGE) 500 MG tablet, Take 500 mg by mouth 2 times daily (with meals)  glimepiride (AMARYL) 2 MG tablet, Take 2 mg by mouth every morning (before breakfast)  amLODIPine (NORVASC) 5 MG tablet, Take 5 mg by mouth daily  donepezil (ARICEPT) 10 MG tablet, Take 10 mg by mouth nightly  lovastatin (MEVACOR) 40 MG tablet, Take 40 mg by mouth nightly  escitalopram (LEXAPRO) 10 MG tablet, Take 10 mg by mouth daily  benazepril (LOTENSIN) 40 MG tablet, Take 40 mg by mouth daily    Allergies:  Patient has no known allergies. Social History:   · TOBACCO:   reports that he has quit smoking. He has never used smokeless tobacco.  · ETOH:   reports previous alcohol use. DRUGS:   Social History     Substance and Sexual Activity   Drug Use Never       Family History:   History reviewed. No pertinent family history. REVIEW OF SYSTEMS:    All pertinent positives and negatives as stated in the HPI       LOWER EXTREMITY EXAMINATION: Previous exam 11/23 below:      VASCULAR:  DP and PT pulses are  faint. CFT delayed B/L. Warm to warm from the tibial tuberosity to the distal aspect of the digits dorsally. No hair growth noted to the distal aspects dorsally. NEUROLOGIC:  Protective sensation is diminished b/l     DERM:  Small, pre-ulcerative lesion measuring about 0.5x0.5x0.2cm was noted to the plantar aspect of the first right digit.  Hyperkeratotic skin noted surrounding the lesion. No noted tunneling, active bleeding or purulence expressed. Does not probe to bone. No noted edema or erythema surrounding the wound. Pitting edema noted diffusely to the LEs L>R. Hyperpigmentation noted to the LLE and ankle of RLE. Superficial abrasion with minor drainage noted         MUSCULOSKELETAL:  4/5 Gross Muscle strength in all 4 quadrants. No posterior calf pain b/l        Wound Care:   Wound #1: right plantar first digit    Size - 0.5x0.5x0.2cm    Appearance - stable/ pre-ulcerative    Drainage - None   Odor - None        CONSULTS:  IP CONSULT TO PRIMARY CARE PROVIDER  IP CONSULT TO PULMONOLOGY  IP CONSULT TO IV TEAM  IP CONSULT TO PODIATRY  IP CONSULT TO SOCIAL WORK  IP CONSULT TO CARDIOLOGY  IP CONSULT TO VASCULAR SURGERY  IP CONSULT TO IV TEAM  IP CONSULT TO IV TEAM    MEDICATION:  Scheduled Meds:   aspirin  81 mg Oral Daily    atorvastatin  20 mg Oral Daily    carvedilol  6.25 mg Oral BID WC    doxycycline hyclate  100 mg Oral 2 times per day    sodium chloride flush  10 mL Intravenous BID    budesonide  250 mcg Nebulization BID    Arformoterol Tartrate  15 mcg Nebulization BID    nystatin  5 mL Oral 4x Daily    miconazole   Topical BID    fluconazole  100 mg Intravenous Q24H    donepezil  10 mg Oral Nightly    lisinopril  40 mg Oral Daily    escitalopram  10 mg Oral Daily    insulin lispro  0-6 Units Subcutaneous TID WC    insulin lispro  0-3 Units Subcutaneous Nightly     Continuous Infusions:   dextrose       PRN Meds:.sodium chloride flush **AND** sodium chloride flush, glucose, dextrose, glucagon (rDNA), dextrose    RADIOLOGY:  XR CHEST PORTABLE   Preliminary Result   Stable findings suggestive of mild pulmonary edema. VL LOWER EXTREMITY ARTERIAL SEGMENTAL PRESSURES W PPG BILATERAL   Final Result   1. Normal left ankle brachial index measuring 1. 14. The right PARAS could not   be calculated due to heart noncompressible vessels.   Toe brachial indices also could not be calculated. 2. Monophasic waveforms at the anterior tibial level bilaterally. Pulse   volume recordings are unremarkable apart from mildly blunted waveforms at the   ankle level bilaterally. 3. Toe waveforms are normal apart from moderate blunting in the right great   toe and severe blunting in the left 2nd toe. 4. If clinically indicated, duplex arterial sonography could be done for   further evaluation. CTA CHEST W CONTRAST   Final Result   1. Very vague ground-glass diffuse pulmonary infiltration suggestive of CHF. 2. 1.5 cm opacity within the left upper lobe noted on axial image 37. This   finding could represent pneumonia. Follow-up CT scan is recommended in 4-6   weeks to exclude an underlying lesion. 3. Bilateral pleural effusions, left greater than right small in size. 4. Minimal fluid is seen within the right major fissure. 5. 4.2 x 1.9 cm fatty lobule seen along the anterior chest wall. This could   represent focal intercostal fatty hyperplasia or lipoma. RECOMMENDATIONS:   Follow-up unenhanced CT scan of the thorax in 4-6 weeks. CT HEAD WO CONTRAST   Final Result   1. There is no acute intracranial abnormality. Specifically, there is no   intracranial hemorrhage. 2. Atrophy and periventricular leukomalacia,      CT CERVICAL SPINE WO CONTRAST   Final Result   No acute osseous abnormality of the cervical spine. XR CHEST PORTABLE   Final Result   Multifocal bilateral patchy pulmonary infiltrates. XR HIP BILATERAL W AP PELVIS (2 VIEWS)   Final Result   1. There is no right or left hip fracture dislocation   2. Mild degenerative changes of the hips.       NM Cardiac Stress Test Nuclear Imaging    (Results Pending)       Vitals:    BP (!) 151/87   Pulse 83   Temp 97.9 °F (36.6 °C) (Oral)   Resp 16   Ht 5' 11\" (1.803 m)   Wt (!) 354 lb 12.8 oz (160.9 kg)   SpO2 95%   BMI 49.48 kg/m²     LABS:   Recent Labs     11/26/20  0655 11/27/20  1330 WBC 9.9 6.8   HGB 12.7 12.7   HCT 44.8 43.0    194     Recent Labs     11/27/20  1330      K 4.7   CL 94*   CO2 41*   BUN 15   CREATININE 0.7     Recent Labs     11/26/20  0655 11/27/20  1330   PROT 6.2* 6.0*       ASSESSMENTS:   Active Problems:  1. Venous stasis  2. Pre-ulcerative lesion to right first digit  3. Chronic edema  4. DM II  5. Lymphedema        Pneumonia    Chronic obstructive pulmonary disease with acute exacerbation (HCC)    Controlled type 2 diabetes mellitus, with long-term current use of insulin (HCC)    Hypoxia    Scrotal edema    Lymphedema    Acute on chronic respiratory failure (HCC)    Fall    COPD exacerbation ([facility]    Uncontrolled type 2 diabetes mellitus with hyperglycemia ([facility]    Dementia without behavioral disturbance ([facility])         PLAN:  - Patient's labs, charts and images were reviewed today   - Wound cultures show heavy growth of staph aureus.  - Antibiotics: azithromycin and rocephin   - Consult placed to vascular surgery, appreciate input   - Dressings: xeroform, DSD, ACE b/l. Daily dressing changes.  - No immediate podiatric surgical intervention indicated at this time. - Continue local wound care   - Patient is stable for discharge from podiatric standpoint once medically clear. - Patient to follow up with Dr. Elvis Lake in clinic 1 week after discharge     - Discussed patient with Dr. Elvis Lake   - Will continue to follow patient while they are in-house. Thank you for the opportunity to take part in the patient's care. Please do not hesitate to call for any questions or concerns.

## 2020-11-27 NOTE — PLAN OF CARE
Problem: Falls - Risk of:  Goal: Will remain free from falls  Description: Will remain free from falls  11/26/2020 2310 by Janee Covarrubias RN  Outcome: Met This Shift  11/26/2020 1910 by Monserrat Calderón RN  Outcome: Met This Shift  Goal: Absence of physical injury  Description: Absence of physical injury  11/26/2020 2310 by Janee Covarrubias RN  Outcome: Met This Shift  11/26/2020 1910 by Monserrat Calderón RN  Outcome: Met This Shift     Problem: Skin Integrity:  Goal: Will show no infection signs and symptoms  Description: Will show no infection signs and symptoms  11/26/2020 2310 by Janee Covarrubias RN  Outcome: Met This Shift  11/26/2020 1910 by Monserrat Calderón RN  Outcome: Ongoing  Goal: Absence of new skin breakdown  Description: Absence of new skin breakdown  11/26/2020 2310 by Janee Covarrubias RN  Outcome: Met This Shift  11/26/2020 1910 by Monserrat Calderón RN  Outcome: Ongoing     Problem: Confusion - Acute:  Goal: Absence of continued neurological deterioration signs and symptoms  Description: Absence of continued neurological deterioration signs and symptoms  11/26/2020 2310 by Janee Covarrubias RN  Outcome: Met This Shift  11/26/2020 1910 by Monserrat Calderón RN  Outcome: Ongoing  Goal: Mental status will be restored to baseline  Description: Mental status will be restored to baseline  11/26/2020 2310 by Janee Covarrubias RN  Outcome: Met This Shift  11/26/2020 1910 by Monserrat Calderón RN  Outcome: Ongoing     Problem: Discharge Planning:  Goal: Ability to perform activities of daily living will improve  Description: Ability to perform activities of daily living will improve  11/26/2020 2310 by Janee Covarrubias RN  Outcome: Met This Shift  11/26/2020 1910 by Monserrat Calderón RN  Outcome: Ongoing  Goal: Participates in care planning  Description: Participates in care planning  11/26/2020 2310 by Janee Covarrubias RN  Outcome: Met This Shift  11/26/2020 1910 by Monserrat Calderón RN  Outcome: Ongoing     Problem: Injury - Risk of, Physical Injury:  Goal: Will remain free from falls  Description: Will remain free from falls  11/26/2020 2310 by Fermin Gordon RN  Outcome: Met This Shift  11/26/2020 1910 by Jose Sneed RN  Outcome: Met This Shift  Goal: Absence of physical injury  Description: Absence of physical injury  11/26/2020 2310 by Fermin Gordon RN  Outcome: Met This Shift  11/26/2020 1910 by Jose Sneed RN  Outcome: Met This Shift     Problem: Mood - Altered:  Goal: Mood stable  Description: Mood stable  Outcome: Met This Shift  Goal: Absence of abusive behavior  Description: Absence of abusive behavior  Outcome: Met This Shift  Goal: Verbalizations of feeling emotionally comfortable while being cared for will increase  Description: Verbalizations of feeling emotionally comfortable while being cared for will increase  Outcome: Met This Shift     Problem: Psychomotor Activity - Altered:  Goal: Absence of psychomotor disturbance signs and symptoms  Description: Absence of psychomotor disturbance signs and symptoms  Outcome: Met This Shift     Problem: Sensory Perception - Impaired:  Goal: Demonstrations of improved sensory functioning will increase  Description: Demonstrations of improved sensory functioning will increase  Outcome: Met This Shift  Goal: Decrease in sensory misperception frequency  Description: Decrease in sensory misperception frequency  Outcome: Met This Shift  Goal: Able to refrain from responding to false sensory perceptions  Description: Able to refrain from responding to false sensory perceptions  Outcome: Met This Shift  Goal: Demonstrates accurate environmental perceptions  Description: Demonstrates accurate environmental perceptions  Outcome: Met This Shift  Goal: Able to distinguish between reality-based and nonreality-based thinking  Description: Able to distinguish between reality-based and nonreality-based thinking  Outcome: Met This Shift  Goal: Able to interrupt nonreality-based thinking  Description: Able to interrupt nonreality-based thinking  Outcome: Met This Shift     Problem: Sleep Pattern Disturbance:  Goal: Appears well-rested  Description: Appears well-rested  Outcome: Met This Shift     Problem:  Activity:  Goal: Ability to tolerate increased activity will improve  Description: Ability to tolerate increased activity will improve  Outcome: Met This Shift     Problem: Cardiac:  Goal: Hemodynamic stability will improve  Description: Hemodynamic stability will improve  Outcome: Met This Shift  Goal: Complications related to the disease process, condition or treatment will be avoided or minimized  Description: Complications related to the disease process, condition or treatment will be avoided or minimized  Outcome: Met This Shift  Goal: Cerebral tissue perfusion will improve  Description: Cerebral tissue perfusion will improve  Outcome: Met This Shift     Problem: Coping:  Goal: Ability to identify and develop effective coping behavior will improve  Description: Ability to identify and develop effective coping behavior will improve  Outcome: Met This Shift     Problem: Health Behavior:  Goal: Identification of resources available to assist in meeting health care needs will improve  Description: Identification of resources available to assist in meeting health care needs will improve  Outcome: Met This Shift     Problem: Nutritional:  Goal: Ability to identify appropriate dietary choices will improve  Description: Ability to identify appropriate dietary choices will improve  Outcome: Met This Shift     Problem: Airway Clearance - Ineffective:  Goal: Ability to maintain a clear airway will improve  Description: Ability to maintain a clear airway will improve  Outcome: Met This Shift

## 2020-11-27 NOTE — CARE COORDINATION
11/27/2020  Social Work Discharge Planning::LILLI was informed by [facility] that [facility] is able to accept Pt. SW notified nurse. N-17 generated, hens completed and transport form is in chart.  Electronically signed by WILMAR Arreguin on 11/27/2020 at 3:53 PM

## 2020-11-27 NOTE — PROGRESS NOTES
Occupational Therapy  OCCUPATIONAL THERAPY INITIAL EVALUATION      Date:2020  Patient Name: Lawrence Norman  MRN: [id number]  : 1945  Room: [address] [place] [address]    Referring Provider: JENNIFER Wilburn; Sophia Ng MD   Evaluating OT: Broderick Da Silva [facility] - .[id number]    AM-PAC Daily Activity Raw Score: 15      Recommended Adaptive Equipment: Continue to assess. Diagnosis: Pneumonia [J18.9]    Pertinent Medical History: COPD, dementia, DM      Precautions: falls, skin integrity, O2 via nasal cannula    Home Living: Patient reported that he lives with his wife in a one-floor home (few steps to enter); patient's bedroom and bathroom are on the main living level. Patient noted that he does not have to access a basement. Bathroom Setup: walk-in shower (no seat, no grab bars) and standard-height toilet  Equipment Owned: N/A    Prior Level of Function (PLOF): Patient reported that he was independent with ADLs, but needed assistance with IADLs. Patient was independent with functional mobility (without device) prior to this hospitalization. Driving: Yes - patient's wife can assist with transportation    Pain Level: Patient denied experiencing pain. Cognition: Patient alert and oriented grossly. Fair command follow demonstrated. Slowed processing and decreased affect noted. Memory: Fair   Sequencing: Fair   Problem Solving: Fair   Judgement/Safety: Fair    Functional Assessment:   Initial Eval Status  Date: 2020 Treatment Status  Date:  Short Term Goals   Feeding Setup  Independent   Grooming Min A  SBA  (seated/standing)   UB Dressing Min A  Setup   LB Dressing Max A to adjust socks. Min A - with use of AE, as needed/appropriate   Bathing Mod A  Min A - with use of AE/DME, as needed/appropriate   Toileting Max A  Patient with rivas catheter currently. Min A   Bed Mobility  Supine-to-Sit: Mod A  Sit-to-Supine:  Mod A      Functional Transfers Sit-to-Stand: Min A   from EOB  (elevated surface)  Supervision   Functional Mobility Min A   (with walker) for few steps forward and backward. Supervision with functional mobility (with device, as needed/appropriate) in order to maximize independence with ADLs/IADLs and other functional tasks. Balance Sitting: Good  (at EOB)  Standing: Fair-  (with walker)  Fair+ dynamic standing balance during completion of ADLs/IADLs and other functional tasks. Activity Tolerance Fair  Patient will demonstrate Good understanding and consistent implementation of energy conservation techniques and work simplification techniques into ADL/IADL routines. Visual/  Perceptual WFL     N/A   B UE Strength 4-/5  Patient will demonstrate 4+/5 B UE strength in order to maximize independence with ADLs/IADLs and functional transfers. Long-Term Goal: Patient will increase functional independence to PLOF in order to allow patient to live in least restrictive environment. ROM: Additional Information:    R UE  WFL    L UE WFL      Hearing: WFL grossly  Sensation: No complaints of numbness/tingling in B UEs. Tone: WFL  Edema: No    Comments: RN approved patient's participation in [address] [place] [address] activities. Upon arrival, patient supine in bed. At end of session, patient supine in bed (per patient preference) with call light and phone within reach and all lines and tubes intact. Patient would benefit from continued skilled OT to increase safety and independence with completion of ADL/IADL tasks for functional independence and quality of life. Treatment: OT treatment provided this date included:    Instruction/training on safety and adapted techniques for completion of ADLs - including potential benefits of DME to maximize safety with ADLs in home environment.   Instruction/training on safe functional mobility/transfer techniques.   Instruction/training on energy conservation/work simplification for completion of ADLs.       Patient education provided regardin) importance of EOB/OOB activities. Patient indicated [address] understanding. Further skilled OT treatment indicated to increase patient's safety and independence with completion of ADL/IADL tasks in order to maximize patient's functional independence and quality of life.     Assessment of Current Deficits:   Functional Mobility [x]  ADLs [x] Strength [x]  Cognition []  Functional Transfers  [x] IADLs [x] Safety Awareness [x]  Endurance [x]  Fine Motor Coordination [] Balance [x] Vision/Perception [] Sensation []   Gross Motor Coordination [] ROM [] Delirium []                  Motor Control []    Plan of Care: 2-5 days/week for 1-2 weeks PRN  [x]ADL retraining/adaptive techniques and AE recommendations to increase functional independence within precautions  [x]Energy conservation techniques to improve tolerance for ADLs/IADLs  [x]Functional transfer/mobility training/DME recommendations for increased independence, safety and fall prevention  [x]Patient/family education to increase safety and functional independence  [x]Environmental modifications for safe mobility and completion of ADLs/IADLs  []Cognitive retraining exercises to improve problem solving skills & safe participation in ADLs/IADLs   []Sensory re-education techniques to improve extremity awareness, maintain skin integrity and improve hand function   []Visual/Perceptual retraining  to improve body awareness and safety during transfers and ADLs   []Splinting/positioning needs to maintain joint/skin integrity and prevent contractures   [x]Therapeutic activity to improve functional performance during ADLs/IADLs  [x]Therapeutic exercise to improve tolerance and functional strength for ADLs/IADLs  [x]Balance retraining/tolerance tasks for facilitation of postural control with dynamic challenges during ADLs   [x]Neuromuscular re-education: facilitate righting/equilibrium reactions, midline orientation, scapular stability/mobility, normalize muscle tone, and facilitate active

## 2020-11-28 LAB
ALBUMIN SERPL-MCNC: 3.1 G/DL (ref 3.5–5.2)
ALP BLD-CCNC: 65 U/L (ref 40–129)
ALT SERPL-CCNC: 31 U/L (ref 0–40)
ANION GAP SERPL CALCULATED.3IONS-SCNC: 4 MMOL/L (ref 7–16)
AST SERPL-CCNC: 18 U/L (ref 0–39)
BILIRUB SERPL-MCNC: 0.4 MG/DL (ref 0–1.2)
BUN BLDV-MCNC: 13 MG/DL (ref 8–23)
CALCIUM SERPL-MCNC: 9.3 MG/DL (ref 8.6–10.2)
CHLORIDE BLD-SCNC: 96 MMOL/L (ref 98–107)
CO2: 38 MMOL/L (ref 22–29)
CREAT SERPL-MCNC: 0.8 MG/DL (ref 0.7–1.2)
GFR AFRICAN AMERICAN: >60
GFR NON-AFRICAN AMERICAN: >60 ML/MIN/1.73
GLUCOSE BLD-MCNC: 178 MG/DL (ref 74–99)
METER GLUCOSE: 151 MG/DL (ref 74–99)
METER GLUCOSE: 213 MG/DL (ref 74–99)
METER GLUCOSE: 274 MG/DL (ref 74–99)
METER GLUCOSE: 281 MG/DL (ref 74–99)
POTASSIUM SERPL-SCNC: 4.1 MMOL/L (ref 3.5–5)
SODIUM BLD-SCNC: 138 MMOL/L (ref 132–146)
TOTAL PROTEIN: 6.3 G/DL (ref 6.4–8.3)

## 2020-11-28 PROCEDURE — 36415 COLL VENOUS BLD VENIPUNCTURE: CPT

## 2020-11-28 PROCEDURE — 6370000000 HC RX 637 (ALT 250 FOR IP): Performed by: INTERNAL MEDICINE

## 2020-11-28 PROCEDURE — 82962 GLUCOSE BLOOD TEST: CPT

## 2020-11-28 PROCEDURE — 2500000003 HC RX 250 WO HCPCS: Performed by: CLINICAL NURSE SPECIALIST

## 2020-11-28 PROCEDURE — APPSS45 APP SPLIT SHARED TIME 31-45 MINUTES: Performed by: NURSE PRACTITIONER

## 2020-11-28 PROCEDURE — 6370000000 HC RX 637 (ALT 250 FOR IP): Performed by: NURSE PRACTITIONER

## 2020-11-28 PROCEDURE — 99232 SBSQ HOSP IP/OBS MODERATE 35: CPT | Performed by: INTERNAL MEDICINE

## 2020-11-28 PROCEDURE — 6360000002 HC RX W HCPCS: Performed by: INTERNAL MEDICINE

## 2020-11-28 PROCEDURE — 94640 AIRWAY INHALATION TREATMENT: CPT

## 2020-11-28 PROCEDURE — 2060000000 HC ICU INTERMEDIATE R&B

## 2020-11-28 PROCEDURE — 80053 COMPREHEN METABOLIC PANEL: CPT

## 2020-11-28 PROCEDURE — 94660 CPAP INITIATION&MGMT: CPT

## 2020-11-28 PROCEDURE — 2580000003 HC RX 258: Performed by: INTERNAL MEDICINE

## 2020-11-28 PROCEDURE — 6370000000 HC RX 637 (ALT 250 FOR IP): Performed by: CLINICAL NURSE SPECIALIST

## 2020-11-28 PROCEDURE — 2700000000 HC OXYGEN THERAPY PER DAY

## 2020-11-28 PROCEDURE — 6360000002 HC RX W HCPCS: Performed by: CLINICAL NURSE SPECIALIST

## 2020-11-28 RX ORDER — SPIRONOLACTONE 25 MG/1
25 TABLET ORAL DAILY
Status: DISCONTINUED | OUTPATIENT
Start: 2020-11-28 | End: 2020-11-30 | Stop reason: HOSPADM

## 2020-11-28 RX ORDER — BUMETANIDE 1 MG/1
1 TABLET ORAL DAILY
Status: DISCONTINUED | OUTPATIENT
Start: 2020-11-29 | End: 2020-11-30 | Stop reason: HOSPADM

## 2020-11-28 RX ADMIN — ATORVASTATIN CALCIUM 20 MG: 20 TABLET, FILM COATED ORAL at 10:06

## 2020-11-28 RX ADMIN — INSULIN LISPRO 2 UNITS: 100 INJECTION, SOLUTION INTRAVENOUS; SUBCUTANEOUS at 21:38

## 2020-11-28 RX ADMIN — INSULIN LISPRO 1 UNITS: 100 INJECTION, SOLUTION INTRAVENOUS; SUBCUTANEOUS at 06:40

## 2020-11-28 RX ADMIN — DOXYCYCLINE HYCLATE 100 MG: 100 CAPSULE ORAL at 21:38

## 2020-11-28 RX ADMIN — ESCITALOPRAM OXALATE 10 MG: 10 TABLET ORAL at 10:06

## 2020-11-28 RX ADMIN — ARFORMOTEROL TARTRATE 15 MCG: 15 SOLUTION RESPIRATORY (INHALATION) at 08:45

## 2020-11-28 RX ADMIN — DONEPEZIL HYDROCHLORIDE 10 MG: 5 TABLET, FILM COATED ORAL at 21:38

## 2020-11-28 RX ADMIN — LISINOPRIL 40 MG: 20 TABLET ORAL at 10:06

## 2020-11-28 RX ADMIN — NYSTATIN 500000 UNITS: 100000 SUSPENSION ORAL at 16:17

## 2020-11-28 RX ADMIN — Medication 10 ML: at 21:38

## 2020-11-28 RX ADMIN — MICONAZOLE NITRATE: 20 POWDER TOPICAL at 10:07

## 2020-11-28 RX ADMIN — ASPIRIN 81 MG: 81 TABLET, COATED ORAL at 10:06

## 2020-11-28 RX ADMIN — BUDESONIDE 250 MCG: 0.25 SUSPENSION RESPIRATORY (INHALATION) at 08:45

## 2020-11-28 RX ADMIN — INSULIN LISPRO 2 UNITS: 100 INJECTION, SOLUTION INTRAVENOUS; SUBCUTANEOUS at 11:38

## 2020-11-28 RX ADMIN — CARVEDILOL 12.5 MG: 6.25 TABLET, FILM COATED ORAL at 10:06

## 2020-11-28 RX ADMIN — Medication 100 MG: at 13:40

## 2020-11-28 RX ADMIN — NYSTATIN 500000 UNITS: 100000 SUSPENSION ORAL at 21:42

## 2020-11-28 RX ADMIN — CARVEDILOL 12.5 MG: 6.25 TABLET, FILM COATED ORAL at 16:17

## 2020-11-28 RX ADMIN — ARFORMOTEROL TARTRATE 15 MCG: 15 SOLUTION RESPIRATORY (INHALATION) at 20:06

## 2020-11-28 RX ADMIN — BUDESONIDE 250 MCG: 0.25 SUSPENSION RESPIRATORY (INHALATION) at 20:06

## 2020-11-28 RX ADMIN — DOXYCYCLINE HYCLATE 100 MG: 100 CAPSULE ORAL at 10:06

## 2020-11-28 RX ADMIN — MICONAZOLE NITRATE: 20 POWDER TOPICAL at 21:38

## 2020-11-28 RX ADMIN — Medication 10 ML: at 10:07

## 2020-11-28 RX ADMIN — SPIRONOLACTONE 25 MG: 25 TABLET ORAL at 10:06

## 2020-11-28 RX ADMIN — NYSTATIN 500000 UNITS: 100000 SUSPENSION ORAL at 10:06

## 2020-11-28 RX ADMIN — NYSTATIN 500000 UNITS: 100000 SUSPENSION ORAL at 13:39

## 2020-11-28 RX ADMIN — INSULIN LISPRO 3 UNITS: 100 INJECTION, SOLUTION INTRAVENOUS; SUBCUTANEOUS at 16:19

## 2020-11-28 ASSESSMENT — PAIN SCALES - PAIN ASSESSMENT IN ADVANCED DEMENTIA (PAINAD)
BREATHING: 0
FACIALEXPRESSION: 0
CONSOLABILITY: 0
TOTALSCORE: 0
NEGVOCALIZATION: 0
BODYLANGUAGE: 0

## 2020-11-28 ASSESSMENT — PAIN SCALES - GENERAL
PAINLEVEL_OUTOF10: 0

## 2020-11-28 NOTE — PROGRESS NOTES
injection 10 mL, 10 mL, Intravenous, BID, 10 mL at 11/27/20 2127 **AND** sodium chloride flush 0.9 % injection 10 mL, 10 mL, Intravenous, PRN, Jose Alberto Hric, DO, 10 mL at 11/27/20 1343    budesonide (PULMICORT) nebulizer suspension 250 mcg, 250 mcg, Nebulization, BID, Maxi Norton MD, 250 mcg at 11/27/20 2046    Arformoterol Tartrate (BROVANA) nebulizer solution 15 mcg, 15 mcg, Nebulization, BID, Maxi Norton MD, 15 mcg at 11/27/20 2046    nystatin (MYCOSTATIN) 891106 UNIT/ML suspension 500,000 Units, 5 mL, Oral, 4x Daily, IKE Pardo, 500,000 Units at 11/27/20 2127    miconazole (MICOTIN) 2 % powder, , Topical, BID, IKE Pardo    fluconazole (DIFLUCAN) in 0.9 % sodium chloride IVPB 100 mg, 100 mg, Intravenous, Q24H, IKE Pardo, Last Rate: 100 mL/hr at 11/27/20 1311, 100 mg at 11/27/20 1311    donepezil (ARICEPT) tablet 10 mg, 10 mg, Oral, Nightly, IKE Pardo - CNS, 10 mg at 11/27/20 2127    lisinopril (PRINIVIL;ZESTRIL) tablet 40 mg, 40 mg, Oral, Daily, IKE Pardo - CNS, 40 mg at 11/27/20 1310    escitalopram (LEXAPRO) tablet 10 mg, 10 mg, Oral, Daily, IKE Pardo - CNS, 10 mg at 11/27/20 1310    insulin lispro (HUMALOG) injection vial 0-6 Units, 0-6 Units, Subcutaneous, TID WC, Jose Alberto Hric, DO, 2 Units at 11/27/20 1825    insulin lispro (HUMALOG) injection vial 0-3 Units, 0-3 Units, Subcutaneous, Nightly, Jose Alberto Hric, DO, 1 Units at 11/27/20 2126    glucose (GLUTOSE) 40 % oral gel 15 g, 15 g, Oral, PRN, Jose Alberto Hric, DO    dextrose 50 % IV solution, 12.5 g, Intravenous, PRN, Jose Alberto Hric, DO    glucagon (rDNA) injection 1 mg, 1 mg, Intramuscular, PRN, Jose Alberto Hric, DO    dextrose 5 % solution, 100 mL/hr, Intravenous, PRN, Jose Alberto Hric, DO    Physical Exam:  BP (!) 164/99   Pulse 78   Temp 97.9 °F (36.6 °C) (Oral)   Resp 16   Ht 5' 11\" (1.803 m)   Wt (!) 354 lb 12.8 oz (160.9 kg)   SpO2 97%   BMI 49.48 kg/m²   Wt Readings from Last 3 Encounters:   11/27/20 (!) 354 lb 12.8 oz (160.9 kg)     Appearance:  Morbidly obese male, awake, alert, no acute respiratory distress, on nasal cannula  Skin: Intact, no rash  Head: Normocephalic, atraumatic  Eyes: EOMI, no conjunctival erythema  ENMT: No pharyngeal erythema, MMM, no rhinorrhea  Neck: Supple, no elevated JVP, no carotid bruits  Lungs: Diminished  Cardiac: PMI nondisplaced, Regular rhythm with a normal rate, S1 & S2 normal, no murmurs  Abdomen: Soft, nontender, +bowel sounds  Extremities: Moves all extremities x 4, trace lower extremity edema  Neurologic: No focal motor deficits apparent, normal mood and affect  Peripheral Pulses: Intact posterior tibial pulses bilaterally    Intake/Output:    Intake/Output Summary (Last 24 hours) at 11/28/2020 0635  Last data filed at 11/28/2020 0073  Gross per 24 hour   Intake --   Output 3700 ml   Net -3700 ml     I/O this shift:  In: -   Out: 2500 [Urine:2500]    Laboratory Tests:  Recent Labs     11/26/20  0655 11/27/20  1330 11/28/20  0413    138 138   K 4.1 4.7 4.1   CL 93* 94* 96*   CO2 45* 41* 38*   BUN 22 15 13   CREATININE 0.9 0.7 0.8   GLUCOSE 183* 250* 178*   CALCIUM 8.9 8.9 9.3     Lab Results   Component Value Date    MG 2.1 11/23/2020     Recent Labs     11/26/20  0655 11/27/20  1330 11/28/20  0413   ALKPHOS 65 64 65   ALT 41* 32 31   AST 30 23 18   PROT 6.2* 6.0* 6.3*   BILITOT 0.4 0.5 0.4   LABALBU 3.3* 3.1* 3.1*     Recent Labs     11/26/20  0655 11/27/20  1330   WBC 9.9 6.8   RBC 5.01 4.90   HGB 12.7 12.7   HCT 44.8 43.0   MCV 89.4 87.8   MCH 25.3* 25.9*   MCHC 28.3* 29.5*   RDW 15.3* 15.0    194   MPV 11.6 13.0*     Lab Results   Component Value Date    CKTOTAL 428 (H) 11/23/2020    CKMB 16.0 (H) 11/23/2020    TROPONINI 0.02 11/23/2020    TROPONINI 0.02 11/23/2020    TROPONINI 0.03 11/22/2020     Lab Results   Component Value Date    INR 1.1 11/22/2020    PROTIME 12.1 11/22/2020     No results found for: TSHFT4, TSH  Lab Results   Component Value Date    LABA1C 8.9 (H) 11/23/2020       Cardiac Tests:    EKG: Sinus rhythm 92 bpm with PACs. Normal axis normal intervals. Nonspecific ST changes. Possible prior septal infarct. Telemetry: Sinus rhythm 80s with PACs and PVCs    CTA chest: 11/23/2020, personally reviewed demonstrating three-vessel coronary calcification  Impression:          1. Very vague ground-glass diffuse pulmonary infiltration suggestive of CHF. 2. 1.5 cm opacity within the left upper lobe noted on axial image 37.  This   finding could represent pneumonia.  Follow-up CT scan is recommended in 4-6   weeks to exclude an underlying lesion. 3. Bilateral pleural effusions, left greater than right small in size. 4. Minimal fluid is seen within the right major fissure. 5. 4.2 x 1.9 cm fatty lobule seen along the anterior chest wall.  This could   represent focal intercostal fatty hyperplasia or lipoma. RECOMMENDATIONS:   Follow-up unenhanced CT scan of the thorax in 4-6 weeks. Echocardiogram:   11/24/2020     Summary   Technically suboptimal and limited study. Mildly dilated left ventricle. Micro-bubble contrast injected to enhance left ventricular visualization. No regional wall motion abnormalities seen. Moderately reduced left ventricular systolic dysfunction, EF 35 to 40%   The left atrium is borderline dilated. Moderately dilated right ventricle. Right ventricle global systolic function is reduced . Mildly enlarged right atrium size. Mild mitral annular calcification. Mild mitral regurgitation is present. The aortic valve appears moderately sclerotic. Mild tricuspid regurgitation. Pulmonary hypertension is mild . Dilation of the ascending aorta. Stress test:      NM Stress (11/27/2020)  Impression:   1. No reversible defects to suggest ischemia. 2. Dilated left ventricle, with global hypokinesis and EF of 46%. 3. Intermediate risk study.        Cardiac catheterization:     ----------------------------------------------------------------------------------------------------------------------------------------------------------------  IMPRESSION:  1. New onset acute heart failure with reduced ejection fraction. 2. ACC stage C / NYHA class III  3. Improved hypervolemia  4. Cardiomyopathy, EF 35 to 40% likely ischemic despite nonischemic stress test yesterday (not a good candidate for invasive procedure will continue medical therapy)  5. Coronary artery disease with three-vessel coronary calcification noted on chest CT  6. Metabolic alkalosis from diuresis  7. Acute on chronic hypoxic/hypercapnic respiratory failure  8. Left-sided pleural opacity in left upper lobe groundglass opacity  9. Morbid obesity, BMI 50 kg/m²  10. Poorly controlled diabetes, A1c 8.9%  11. Hypertension, running high  12. Dementia  13. JACEK      RECOMMENDATIONS:  · Coreg titrated yesterday, to receive first increased dose this morning  · Add spironolactone  · Continue lisinopril  · Start PO bumex 1 mg daily tomorrow  · Continue low-dose aspirin and increase atorvastatin  · Aggressive risk factor modification, weight loss, sleep apnea treatment  · Ok for discharge to SNF from cardiology standpoint, will follow up as OP and continue titration of GDMT as tolerated. · Will get follow up labs middle of next week  · Fluid management instructions placed on discharge for SNF facility. Above discussed with Dr. Indira RAMIRES-[facility]. ______________________________________________________________________  Cardiology attending attestation:  I have independently interviewed and examined the patient. I personally reviewed pertinent laboratory values and diagnostic testing (including, if applicable, chest xray, electrocardiogram, telemetry, echocardiogram, stress testing, and coronary angiography).   I have reviewed the above documentation completed by IKE Castillo CNP agree with the findings, assessment and plan except where noted. Plan formulated under my direct supervision. I participated in all aspects of the medical decision making. Please see my additional contributions to the HPI, physical exam, and assessment / medical decision making:  _______________________________________________________________________    Stress reviewed. No inducible ischemia. Nonetheless etiology likely due to multivessel CAD. Optimize medical therapy as above. Okay for discharge and outpatient follow-up.     Kiera Marquez MD, [address] [facility]

## 2020-11-28 NOTE — PROGRESS NOTES
Department of Podiatry   Progress Note        Patient seen for venous stasis and stage 0 wound of the digit. Patient is resting comfortably and appears in no acute distress. Awakens to verbal stimuli. Denies n/v/f/c/d/sob/cp. No new pedal complaints. Past Medical History:        Diagnosis Date    [facility]chronic obstructive [facility] disease) [facility]     Dementia ([facility]     Diabetes mellitus ([facility]        Past Surgical History:    History reviewed. No pertinent surgical history. Medications Prior to Admission:    Medications Prior to Admission: metFORMIN (GLUCOPHAGE) 500 MG tablet, Take 500 mg by mouth 2 times daily (with meals)  glimepiride (AMARYL) 2 MG tablet, Take 2 mg by mouth every morning (before breakfast)  amLODIPine (NORVASC) 5 MG tablet, Take 5 mg by mouth daily  donepezil (ARICEPT) 10 MG tablet, Take 10 mg by mouth nightly  lovastatin (MEVACOR) 40 MG tablet, Take 40 mg by mouth nightly  escitalopram (LEXAPRO) 10 MG tablet, Take 10 mg by mouth daily  benazepril (LOTENSIN) 40 MG tablet, Take 40 mg by mouth daily    Allergies:  Patient has no known allergies. Social History:   · TOBACCO:   reports that he has quit smoking. He has never used smokeless tobacco.  · ETOH:   reports previous alcohol use. DRUGS:   Social History     Substance and Sexual Activity   Drug Use Never       Family History:   History reviewed. No pertinent family history. REVIEW OF SYSTEMS:    All pertinent positives and negatives as stated in the HPI       LOWER EXTREMITY EXAMINATION:       VASCULAR:  DP and PT pulses are  faint. CFT delayed B/L. Warm to warm from the tibial tuberosity to the distal aspect of the digits dorsally. No hair growth noted to the distal aspects dorsally. NEUROLOGIC:  Protective sensation is diminished b/l     DERM:  Small, pre-ulcerative lesion measuring about 0.5x0.5x0.2cm was noted to the plantar aspect of the first right digit. Hyperkeratotic skin noted surrounding the lesion.  No noted tunneling, active bleeding or purulence expressed. Does not probe to bone. No noted edema or erythema surrounding the wound. Pitting edema noted diffusely to the LEs L>R. Hyperpigmentation noted to the LLE and ankle of RLE. Superficial abrasion with minor drainage noted         MUSCULOSKELETAL:  4/5 Gross Muscle strength in all 4 quadrants. No posterior calf pain b/l        Wound Care:   Wound #1: right plantar first digit    Size - 0.5x0.5x0.2cm    Appearance - stable/ pre-ulcerative    Drainage - None   Odor - None        CONSULTS:  IP CONSULT TO PRIMARY CARE PROVIDER  IP CONSULT TO PULMONOLOGY  IP CONSULT TO IV TEAM  IP CONSULT TO PODIATRY  IP CONSULT TO SOCIAL WORK  IP CONSULT TO CARDIOLOGY  IP CONSULT TO VASCULAR SURGERY  IP CONSULT TO IV TEAM  IP CONSULT TO IV TEAM    MEDICATION:  Scheduled Meds:   spironolactone  25 mg Oral Daily    [START ON 11/29/2020] bumetanide  1 mg Oral Daily    carvedilol  12.5 mg Oral BID WC    aspirin  81 mg Oral Daily    atorvastatin  20 mg Oral Daily    doxycycline hyclate  100 mg Oral 2 times per day    sodium chloride flush  10 mL Intravenous BID    budesonide  250 mcg Nebulization BID    Arformoterol Tartrate  15 mcg Nebulization BID    nystatin  5 mL Oral 4x Daily    miconazole   Topical BID    fluconazole  100 mg Intravenous Q24H    donepezil  10 mg Oral Nightly    lisinopril  40 mg Oral Daily    escitalopram  10 mg Oral Daily    insulin lispro  0-6 Units Subcutaneous TID WC    insulin lispro  0-3 Units Subcutaneous Nightly     Continuous Infusions:   dextrose       PRN Meds:.sodium chloride flush **AND** sodium chloride flush, glucose, dextrose, glucagon (rDNA), dextrose    RADIOLOGY:  XR CHEST PORTABLE   Final Result   Stable findings suggestive of mild pulmonary edema. NM Cardiac Stress Test Nuclear Imaging   Final Result   1. No reversible defects to suggest ischemia. 2. Dilated left ventricle, with global hypokinesis and EF of 46%.    3.

## 2020-11-29 LAB
ALBUMIN SERPL-MCNC: 3.3 G/DL (ref 3.5–5.2)
ALP BLD-CCNC: 66 U/L (ref 40–129)
ALT SERPL-CCNC: 28 U/L (ref 0–40)
ANION GAP SERPL CALCULATED.3IONS-SCNC: 4 MMOL/L (ref 7–16)
AST SERPL-CCNC: 17 U/L (ref 0–39)
BILIRUB SERPL-MCNC: 0.3 MG/DL (ref 0–1.2)
BUN BLDV-MCNC: 14 MG/DL (ref 8–23)
CALCIUM SERPL-MCNC: 9.2 MG/DL (ref 8.6–10.2)
CHLORIDE BLD-SCNC: 96 MMOL/L (ref 98–107)
CO2: 37 MMOL/L (ref 22–29)
CREAT SERPL-MCNC: 0.9 MG/DL (ref 0.7–1.2)
GFR AFRICAN AMERICAN: >60
GFR NON-AFRICAN AMERICAN: >60 ML/MIN/1.73
GLUCOSE BLD-MCNC: 192 MG/DL (ref 74–99)
METER GLUCOSE: 189 MG/DL (ref 74–99)
METER GLUCOSE: 228 MG/DL (ref 74–99)
METER GLUCOSE: 235 MG/DL (ref 74–99)
METER GLUCOSE: 247 MG/DL (ref 74–99)
POTASSIUM SERPL-SCNC: 4 MMOL/L (ref 3.5–5)
SODIUM BLD-SCNC: 137 MMOL/L (ref 132–146)
TOTAL PROTEIN: 6.4 G/DL (ref 6.4–8.3)

## 2020-11-29 PROCEDURE — 36415 COLL VENOUS BLD VENIPUNCTURE: CPT

## 2020-11-29 PROCEDURE — 2700000000 HC OXYGEN THERAPY PER DAY

## 2020-11-29 PROCEDURE — 99222 1ST HOSP IP/OBS MODERATE 55: CPT | Performed by: SURGERY

## 2020-11-29 PROCEDURE — 6370000000 HC RX 637 (ALT 250 FOR IP): Performed by: CLINICAL NURSE SPECIALIST

## 2020-11-29 PROCEDURE — 97530 THERAPEUTIC ACTIVITIES: CPT

## 2020-11-29 PROCEDURE — 94640 AIRWAY INHALATION TREATMENT: CPT

## 2020-11-29 PROCEDURE — 2580000003 HC RX 258: Performed by: INTERNAL MEDICINE

## 2020-11-29 PROCEDURE — 82962 GLUCOSE BLOOD TEST: CPT

## 2020-11-29 PROCEDURE — 6360000002 HC RX W HCPCS: Performed by: INTERNAL MEDICINE

## 2020-11-29 PROCEDURE — 6360000002 HC RX W HCPCS: Performed by: CLINICAL NURSE SPECIALIST

## 2020-11-29 PROCEDURE — 6370000000 HC RX 637 (ALT 250 FOR IP): Performed by: INTERNAL MEDICINE

## 2020-11-29 PROCEDURE — 94660 CPAP INITIATION&MGMT: CPT

## 2020-11-29 PROCEDURE — 80053 COMPREHEN METABOLIC PANEL: CPT

## 2020-11-29 PROCEDURE — 2060000000 HC ICU INTERMEDIATE R&B

## 2020-11-29 PROCEDURE — 6370000000 HC RX 637 (ALT 250 FOR IP): Performed by: NURSE PRACTITIONER

## 2020-11-29 RX ADMIN — CARVEDILOL 12.5 MG: 6.25 TABLET, FILM COATED ORAL at 09:13

## 2020-11-29 RX ADMIN — BUMETANIDE 1 MG: 1 TABLET ORAL at 09:13

## 2020-11-29 RX ADMIN — DOXYCYCLINE HYCLATE 100 MG: 100 CAPSULE ORAL at 20:20

## 2020-11-29 RX ADMIN — CARVEDILOL 12.5 MG: 6.25 TABLET, FILM COATED ORAL at 16:25

## 2020-11-29 RX ADMIN — NYSTATIN 500000 UNITS: 100000 SUSPENSION ORAL at 20:20

## 2020-11-29 RX ADMIN — INSULIN LISPRO 4 UNITS: 100 INJECTION, SOLUTION INTRAVENOUS; SUBCUTANEOUS at 16:24

## 2020-11-29 RX ADMIN — Medication 10 ML: at 20:21

## 2020-11-29 RX ADMIN — NYSTATIN 500000 UNITS: 100000 SUSPENSION ORAL at 16:25

## 2020-11-29 RX ADMIN — SPIRONOLACTONE 25 MG: 25 TABLET ORAL at 09:13

## 2020-11-29 RX ADMIN — ASPIRIN 81 MG: 81 TABLET, COATED ORAL at 09:13

## 2020-11-29 RX ADMIN — BUDESONIDE 250 MCG: 0.25 SUSPENSION RESPIRATORY (INHALATION) at 19:52

## 2020-11-29 RX ADMIN — INSULIN LISPRO 4 UNITS: 100 INJECTION, SOLUTION INTRAVENOUS; SUBCUTANEOUS at 11:33

## 2020-11-29 RX ADMIN — MICONAZOLE NITRATE: 20 POWDER TOPICAL at 20:20

## 2020-11-29 RX ADMIN — NYSTATIN 500000 UNITS: 100000 SUSPENSION ORAL at 09:14

## 2020-11-29 RX ADMIN — ATORVASTATIN CALCIUM 20 MG: 20 TABLET, FILM COATED ORAL at 09:13

## 2020-11-29 RX ADMIN — ARFORMOTEROL TARTRATE 15 MCG: 15 SOLUTION RESPIRATORY (INHALATION) at 19:53

## 2020-11-29 RX ADMIN — DONEPEZIL HYDROCHLORIDE 10 MG: 5 TABLET, FILM COATED ORAL at 20:20

## 2020-11-29 RX ADMIN — INSULIN LISPRO 2 UNITS: 100 INJECTION, SOLUTION INTRAVENOUS; SUBCUTANEOUS at 06:31

## 2020-11-29 RX ADMIN — MICONAZOLE NITRATE: 20 POWDER TOPICAL at 09:14

## 2020-11-29 RX ADMIN — Medication 100 MG: at 11:33

## 2020-11-29 RX ADMIN — INSULIN LISPRO 2 UNITS: 100 INJECTION, SOLUTION INTRAVENOUS; SUBCUTANEOUS at 20:21

## 2020-11-29 RX ADMIN — ESCITALOPRAM OXALATE 10 MG: 10 TABLET ORAL at 09:13

## 2020-11-29 RX ADMIN — DOXYCYCLINE HYCLATE 100 MG: 100 CAPSULE ORAL at 09:14

## 2020-11-29 RX ADMIN — NYSTATIN 500000 UNITS: 100000 SUSPENSION ORAL at 14:27

## 2020-11-29 RX ADMIN — Medication 10 ML: at 09:14

## 2020-11-29 RX ADMIN — ARFORMOTEROL TARTRATE 15 MCG: 15 SOLUTION RESPIRATORY (INHALATION) at 10:11

## 2020-11-29 RX ADMIN — LISINOPRIL 40 MG: 20 TABLET ORAL at 09:13

## 2020-11-29 RX ADMIN — BUDESONIDE 250 MCG: 0.25 SUSPENSION RESPIRATORY (INHALATION) at 10:11

## 2020-11-29 ASSESSMENT — PAIN SCALES - GENERAL
PAINLEVEL_OUTOF10: 0
PAINLEVEL_OUTOF10: 0

## 2020-11-29 NOTE — CONSULTS
Vascular Surgery Inpatient Consultation Note      Reason for Consultation: Abnormal vascular exam    HISTORY OF PRESENT ILLNESS:                The patient is a 77 y.o. male who is admitted to the hospital for treatment of unresponsiveness. The patient was found unresponsive by his wife at home. History of COPD and leg swelling. History of obesity and alcohol use. The patient has a history of venous stasis ulcerations and chronic edema. He was seen by podiatry. Vascular studies were ordered that were abnormal.  Vascular surgery is consulted for evaluation and treatment. IMPRESSION: Dependency ulcerations. The patient's main problems with respect to his lower extremity wounds are obesity, sedentary state, and dependency. Being severely debilitated with swollen legs, it is unlikely he will ever heal his wounds and is high risk for recurrence. These wounds are not arterial in origin. No additional testing or intervention from vascular surgery standpoint. Patient Active Problem List   Diagnosis Code    Pneumonia J18.9    Chronic obstructive pulmonary disease with acute exacerbation ([address] J44.1    Controlled type 2 diabetes mellitus, with long-term current use of insulin ([address] E11.9, Z79.4    Hypoxia R09.02    Scrotal edema N50.89    Lymphedema I89.0    Acute on chronic respiratory failure ([address] J96.20    Fall W19. Ace Bougie COPD exacerbation ([address] J44.1    Uncontrolled type 2 diabetes mellitus with hyperglycemia (HCC) E11.65    Dementia without behavioral disturbance ([facility]) F03.90    Acute on chronic combined systolic and diastolic congestive heart failure (HCC) I50.43    Coronary artery disease involving native coronary artery of native heart without angina pectoris I25.10    Morbid obesity ([facility]) E66.01       Past Medical History:   Diagnosis Date    COPD (chronic obstructive pulmonary disease) ([address]     Dementia ([address]     Diabetes mellitus ([address]         History reviewed.  No pertinent surgical history. Current Medications:    dextrose        sodium chloride flush **AND** sodium chloride flush, glucose, dextrose, glucagon (rDNA), dextrose    insulin lispro  0-12 Units Subcutaneous TID WC    insulin lispro  0-6 Units Subcutaneous Nightly    spironolactone  25 mg Oral Daily    bumetanide  1 mg Oral Daily    carvedilol  12.5 mg Oral BID WC    aspirin  81 mg Oral Daily    atorvastatin  20 mg Oral Daily    doxycycline hyclate  100 mg Oral 2 times per day    sodium chloride flush  10 mL Intravenous BID    budesonide  250 mcg Nebulization BID    Arformoterol Tartrate  15 mcg Nebulization BID    nystatin  5 mL Oral 4x Daily    miconazole   Topical BID    fluconazole  100 mg Intravenous Q24H    donepezil  10 mg Oral Nightly    lisinopril  40 mg Oral Daily    escitalopram  10 mg Oral Daily        Allergies:  Patient has no known allergies.     Social History     Socioeconomic History    Marital status:      Spouse name: Not on file    Number of children: Not on file    Years of education: Not on file    Highest education level: Not on file   Occupational History    Not on file   Social Needs    Financial resource strain: Not on file    Food insecurity     Worry: Not on file     Inability: Not on file    Transportation needs     Medical: Not on file     Non-medical: Not on file   Tobacco Use    Smoking status: Former Smoker    Smokeless tobacco: Never Used   Substance and Sexual Activity    Alcohol use: Not Currently     Comment: hx heavy drinking per wife     Drug use: Never    Sexual activity: Not on file   Lifestyle    Physical activity     Days per week: Not on file     Minutes per session: Not on file    Stress: Not on file   Relationships    Social connections     Talks on phone: Not on file     Gets together: Not on file     Attends [employer] service: Not on file     Active member of club or organization: Not on file     Attends meetings of clubs or organizations: Not on file     Relationship status: Not on file    Intimate partner violence     Fear of current or ex partner: Not on file     Emotionally abused: Not on file     Physically abused: Not on file     Forced sexual activity: Not on file   Other Topics Concern    Not on file   Social History Narrative    Not on file        History reviewed. No pertinent family history.     REVIEW OF SYSTEMS:      Eyes:      Blurred vision:  No [x]/Yes []               Diplopia:   No [x]/Yes []               Vision loss:       No [x]/Yes []   Ears, nose, throat:             Hearing loss:    No [x]/Yes []      Vertigo:   No [x]/Yes []                       Swallowing problem:  No [x]/Yes []               Nose bleeds:   No [x]/Yes []      Voice hoarseness:  No [x]/Yes []  Respiratory:             Cough:   No [x]/Yes []      Pleuritic chest pain:  No [x]/Yes []                        Dyspnea:   No [x]/Yes []      Wheezing:   No [x]/Yes []  Cardiovascular:             Angina:   No [x]/Yes []      Palpitations:   No [x]/Yes []          Claudication:    No [x]/Yes []      Leg swelling:   No []/Yes [x]  Gastrointestinal:             Nausea or vomiting:  No [x]/Yes []               Abdominal pain:  No [x]/Yes []                     Intestinal bleeding: No [x]/Yes []  Musculoskeletal:             Leg pain:   No [x]/Yes []      Back pain:   No [x]/Yes []                    Weakness:   No [x]/Yes []  Neurologic:             Numbness:   No [x]/Yes []      Paralysis:   No [x]/Yes []                       Headaches:   No [x]/Yes []  Hematologic, lymphatic:   Anemia:   No [x]/Yes []              Bleeding or bruising:  No [x]/Yes []              Fevers or chills: No [x]/Yes []  Endocrine:             Temp intolerance:   No [x]/Yes []                       Polydipsia, polyuria:  No [x]/Yes []  Skin:              Rash:    No [x]/Yes []      Ulcers:   No [x]/Yes []              Abnorm pigment: No [x]/Yes []  : Frequency/urgency:  No [x]/Yes []      Hematuria:    No [x]/Yes []                      Incontinence:    No [x]/Yes []    PHYSICAL EXAM:  Vitals:    11/29/20 1014   BP:    Pulse:    Resp:    Temp:    SpO2: 96%     General Appearance: alert and oriented to person, place and time, in no acute distress, well- nourished  Neurologic: no cranial nerve deficit, speech normal  Head: normocephalic and atraumatic  Eyes: extraocular eye movements intact, conjunctivae normal  ENT: external ear and ear canal normal bilaterally, nose without deformity  Pulmonary/Chest: normal air movement, no respiratory distress  Cardiovascular: normal rate, regular rhythm  Abdomen: non-distended, no masses  Musculoskeletal: no joint deformity or tenderness  Extremities: Significant edema bilaterally. Skin: warm and dry, no rash or erythema, lower extremities wrapped. PULSE EXAM      Right      Left   Brachial     Radial     Femoral     Popliteal     Dorsalis Pedis     Posterior Tibial     (3=normal, 2=diminished, 1=barely palpable, 4=widened)      LABS:    Lab Results   Component Value Date    WBC 6.8 11/27/2020    HGB 12.7 11/27/2020    HCT 43.0 11/27/2020     11/27/2020    PROTIME 12.1 11/22/2020    INR 1.1 11/22/2020    K 4.0 11/29/2020    BUN 14 11/29/2020    CREATININE 0.9 11/29/2020       RADIOLOGY:    Arterial studies reviewed.

## 2020-11-29 NOTE — PROGRESS NOTES
Subjective: The patient is awake. No problems overnight. Denies chest pain, angina, and dyspnea. Denies abdominal pain. Tolerating diet. No nausea or vomiting. Objective:    /70   Pulse 70   Temp 98.2 °F (36.8 °C) (Oral)   Resp 16   Ht 5' 11\" (1.803 m)   Wt (!) 354 lb 12.8 oz (160.9 kg)   SpO2 97%   BMI 49.48 kg/m²     Heart:  RRR, no murmurs, gallops, or rubs. Lungs:  CTA bilaterally, no wheeze, rales or rhonchi  Abd: bowel sounds present, nontender, nondistended, no masses  Extrem:  No clubbing, cyanosis, or edema    CBC:   Lab Results   Component Value Date    WBC 6.8 11/27/2020    RBC 4.90 11/27/2020    HGB 12.7 11/27/2020    HCT 43.0 11/27/2020    MCV 87.8 11/27/2020    MCH 25.9 11/27/2020    MCHC 29.5 11/27/2020    RDW 15.0 11/27/2020     11/27/2020    MPV 13.0 11/27/2020     BMP:    Lab Results   Component Value Date     11/28/2020    K 4.1 11/28/2020    K 4.6 11/22/2020    CL 96 11/28/2020    CO2 38 11/28/2020    BUN 13 11/28/2020    LABALBU 3.1 11/28/2020    CREATININE 0.8 11/28/2020    CALCIUM 9.3 11/28/2020    GFRAA >60 11/28/2020    LABGLOM >60 11/28/2020    GLUCOSE 178 11/28/2020        Assessment:  DM control in progress. Patient Active Problem List   Diagnosis    Pneumonia    Chronic obstructive pulmonary disease with acute exacerbation (HCC)    Controlled type 2 diabetes mellitus, with long-term current use of insulin ([address]    Hypoxia    Scrotal edema    Lymphedema    Acute on chronic respiratory failure ([address]    Fall    COPD exacerbation ([address]    Uncontrolled type 2 diabetes mellitus with hyperglycemia ([address]    Dementia without behavioral disturbance ([address]    Acute on chronic combined systolic and diastolic congestive heart failure ([address]    Coronary artery disease involving native coronary artery of native heart without angina pectoris    Morbid obesity ([address]       Plan:  DC planning in progress.           Jami Lopez  6:19 AM  11/29/2020

## 2020-11-29 NOTE — PROGRESS NOTES
Department of Podiatry   Progress Note        Patient seen for venous stasis and stage 0 wound of the digit. Patient is resting comfortably and appears in no acute distress. Awakens to verbal stimuli. Denies n/v/f/c/d/sob/cp. No new pedal complaints. Past Medical History:        Diagnosis Date    COPD (chronic obstructive pulmonary disease) [facility]     Dementia ([facility]     Diabetes mellitus ([facility]        Past Surgical History:    History reviewed. No pertinent surgical history. Medications Prior to Admission:    Medications Prior to Admission: metFORMIN (GLUCOPHAGE) 500 MG tablet, Take 500 mg by mouth 2 times daily (with meals)  glimepiride (AMARYL) 2 MG tablet, Take 2 mg by mouth every morning (before breakfast)  amLODIPine (NORVASC) 5 MG tablet, Take 5 mg by mouth daily  donepezil (ARICEPT) 10 MG tablet, Take 10 mg by mouth nightly  lovastatin (MEVACOR) 40 MG tablet, Take 40 mg by mouth nightly  escitalopram (LEXAPRO) 10 MG tablet, Take 10 mg by mouth daily  benazepril (LOTENSIN) 40 MG tablet, Take 40 mg by mouth daily    Allergies:  Patient has no known allergies. Social History:   · TOBACCO:   reports that he has quit smoking. He has never used smokeless tobacco.  · ETOH:   reports previous alcohol use. DRUGS:   Social History     Substance and Sexual Activity   Drug Use Never       Family History:   History reviewed. No pertinent family history. REVIEW OF SYSTEMS:    All pertinent positives and negatives as stated in the HPI       LOWER EXTREMITY EXAMINATION:       VASCULAR:  DP and PT pulses are  faint. CFT delayed B/L. Warm to warm from the tibial tuberosity to the distal aspect of the digits dorsally. No hair growth noted to the distal aspects dorsally. NEUROLOGIC:  Protective sensation is diminished b/l     DERM:  Small, pre-ulcerative lesion measuring about 0.5x0.5x0.2cm was noted to the plantar aspect of the first right digit. Hyperkeratotic skin noted surrounding the lesion.  No noted VL LOWER EXTREMITY ARTERIAL SEGMENTAL PRESSURES W PPG BILATERAL   Final Result   1. Normal left ankle brachial index measuring 1. 14. The right PARAS could not   be calculated due to heart noncompressible vessels. Toe brachial indices   also could not be calculated. 2. Monophasic waveforms at the anterior tibial level bilaterally. Pulse   volume recordings are unremarkable apart from mildly blunted waveforms at the   ankle level bilaterally. 3. Toe waveforms are normal apart from moderate blunting in the right great   toe and severe blunting in the left 2nd toe. 4. If clinically indicated, duplex arterial sonography could be done for   further evaluation. CTA CHEST W CONTRAST   Final Result   1. Very vague ground-glass diffuse pulmonary infiltration suggestive of CHF. 2. 1.5 cm opacity within the left upper lobe noted on axial image 37. This   finding could represent pneumonia. Follow-up CT scan is recommended in 4-6   weeks to exclude an underlying lesion. 3. Bilateral pleural effusions, left greater than right small in size. 4. Minimal fluid is seen within the right major fissure. 5. 4.2 x 1.9 cm fatty lobule seen along the anterior chest wall. This could   represent focal intercostal fatty hyperplasia or lipoma. RECOMMENDATIONS:   Follow-up unenhanced CT scan of the thorax in 4-6 weeks. CT HEAD WO CONTRAST   Final Result   1. There is no acute intracranial abnormality. Specifically, there is no   intracranial hemorrhage. 2. Atrophy and periventricular leukomalacia,      CT CERVICAL SPINE WO CONTRAST   Final Result   No acute osseous abnormality of the cervical spine. XR CHEST PORTABLE   Final Result   Multifocal bilateral patchy pulmonary infiltrates. XR HIP BILATERAL W AP PELVIS (2 VIEWS)   Final Result   1. There is no right or left hip fracture dislocation   2. Mild degenerative changes of the hips.           Vitals:    /73   Pulse 80   Temp 97.9 °F (36.6 °C) (Oral)   Resp 16   Ht 5' 11\" (1.803 m)   Wt (!) 325 lb (147.4 kg)   SpO2 96%   BMI 45.33 kg/m²     LABS:   Recent Labs     11/27/20  1330   WBC 6.8   HGB 12.7   HCT 43.0        Recent Labs     11/29/20  0815      K 4.0   CL 96*   CO2 37*   BUN 14   CREATININE 0.9     Recent Labs     11/28/20  0413 11/29/20  0815   PROT 6.3* 6.4       ASSESSMENTS:   Active Problems:  1. Venous stasis  2. Pre-ulcerative lesion to right first digit  3. Chronic edema  4. DM II  5. Lymphedema        Pneumonia    Chronic obstructive pulmonary disease with acute exacerbation (HCC)    Controlled type 2 diabetes mellitus, with long-term current use of insulin (HCC)    Hypoxia    Scrotal edema    Lymphedema    Acute on chronic respiratory failure (HCC)    Fall    COPD exacerbation ([facility]    Uncontrolled type 2 diabetes mellitus with hyperglycemia ([facility]    Dementia without behavioral disturbance (HCC)         PLAN:  - Patient's labs, charts and images were reviewed today   - Wound cultures show heavy growth of staph aureus.  - Dressings: xeroform, DSD, ACE b/l. Daily dressing changes.  - No immediate podiatric surgical intervention indicated at this time. - Continue local wound care   - Patient is stable for discharge from podiatric standpoint once medically clear. - Patient to follow up with Dr. Mechelle Lopez in clinic 1 week after discharge   - Discharge planning underway    - Discussed patient with Dr. Mechelle Lopez   - Will continue to follow patient while they are in-house. Thank you for the opportunity to take part in the patient's care. Please do not hesitate to call for any questions or concerns.

## 2020-11-29 NOTE — PROGRESS NOTES
Physical Therapy  Facility/Department: [address] INTERNAL MEDICINE 2  Daily Treatment Note  NAME: Genesis Perez  : 1945  MRN: [id number]    Date of Service: 2020    Patient Diagnosis(es): The primary encounter diagnosis was HFrEF (heart failure with reduced ejection fraction) ([facility] Diagnoses of Pneumonia due to organism, Elevated brain natriuretic peptide (BNP) level, Hyperglycemia, Elevated CK, Fall, initial encounter, Unable to ambulate, and Scrotal edema were also pertinent to this visit. has a past medical history of COPD (chronic obstructive pulmonary disease) ([facility] Dementia ([facility] and Diabetes mellitus ([facility] has no past surgical history on file. Attending Provider:  Priya Kincaid DO     Evaluating PT:  Molly Liu, P.T.     Room #:  6286/1542-G  Diagnosis:  PNA, fell at home  Pertinent PMHx/PSHx:  dementia  Precautions:  falls  Equipment Needs: Wide wheeled walker     SUBJECTIVE:     Pt lives with wife in a 1 story home with 2-3 stairs and 1 rail to enter. Pt ambulated with no AD PTA.     OBJECTIVE:    Initial Evaluation  Date: 20 Treatment   Short Term/ Long Term   Goals   Was pt agreeable to Eval/treatment? yes  yes     Does pt have pain?  No c/o pain       Bed Mobility  Rolling: SBA  Supine to sit: MIN A  Sit to supine: MIN A  Scooting: MIN A  supine to sit mod assist  Scooting to edge of bed mod assist Independent    Transfers Sit to stand: MIN A  Stand to sit: MIN A  Stand pivot: MIN A with ww  sit <> stand mod assist   supervision   Ambulation   15 feet x 2 reps with wide ww MIN A  5 feet with ww min assist 150 feet with wide ww supervision   Stair negotiation: ascended and descended NA   4 steps with 1 rail SBA   AM-PAC 6 Clicks         Patient education  Pt was educated on importance of mobility    Patient response to education:   Pt verbalized understanding Pt demonstrated skill Pt requires further education in this area   yes Additional Comments/treatment:  Pt states he has not been up to a chair since admission. Motivated to get out of bed. Once seated edge of bed pt instructed on hand placement for transfers and transfer technique. Multiple trials before pt able to fully stand. Pt ambulated a few steps bed to chair. Pt fatigues with mobility. Pt was left in chair with call light left by patient. Chair/bed alarm: yes    Time in: 1040  Time out: 1055    Total treatment time 15 minutes    Pt is making  progress toward established Physical Therapy goals. Continue with physical therapy current plan of care.     Pretty PT [id number]      CPT codes:  [] Low Complexity PT evaluation 27818  [] Moderate Complexity PT evaluation 56452  [] High Complexity PT evaluation 08422  [] PT Re-evaluation 10542  [] Gait training 56506  minutes  [] Manual therapy 88779    minutes  [x] Therapeutic activities 20083  15  minutes  [] Therapeutic exercises 01147     minutes  [] Neuromuscular reeducation 89447     minutes

## 2020-11-30 VITALS
TEMPERATURE: 97.7 F | HEIGHT: 71 IN | SYSTOLIC BLOOD PRESSURE: 142 MMHG | OXYGEN SATURATION: 100 % | HEART RATE: 80 BPM | BODY MASS INDEX: 44.1 KG/M2 | DIASTOLIC BLOOD PRESSURE: 77 MMHG | RESPIRATION RATE: 16 BRPM | WEIGHT: 315 LBS

## 2020-11-30 LAB
ALBUMIN SERPL-MCNC: 3.1 G/DL (ref 3.5–5.2)
ALP BLD-CCNC: 63 U/L (ref 40–129)
ALT SERPL-CCNC: 26 U/L (ref 0–40)
ANION GAP SERPL CALCULATED.3IONS-SCNC: 2 MMOL/L (ref 7–16)
AST SERPL-CCNC: 20 U/L (ref 0–39)
BILIRUB SERPL-MCNC: 0.3 MG/DL (ref 0–1.2)
BUN BLDV-MCNC: 16 MG/DL (ref 8–23)
CALCIUM SERPL-MCNC: 9.2 MG/DL (ref 8.6–10.2)
CHLORIDE BLD-SCNC: 96 MMOL/L (ref 98–107)
CO2: 39 MMOL/L (ref 22–29)
CREAT SERPL-MCNC: 0.9 MG/DL (ref 0.7–1.2)
GFR AFRICAN AMERICAN: >60
GFR NON-AFRICAN AMERICAN: >60 ML/MIN/1.73
GLUCOSE BLD-MCNC: 185 MG/DL (ref 74–99)
METER GLUCOSE: 159 MG/DL (ref 74–99)
METER GLUCOSE: 316 MG/DL (ref 74–99)
POTASSIUM SERPL-SCNC: 4.4 MMOL/L (ref 3.5–5)
SODIUM BLD-SCNC: 137 MMOL/L (ref 132–146)
TOTAL PROTEIN: 6.2 G/DL (ref 6.4–8.3)

## 2020-11-30 PROCEDURE — 94660 CPAP INITIATION&MGMT: CPT

## 2020-11-30 PROCEDURE — 36415 COLL VENOUS BLD VENIPUNCTURE: CPT

## 2020-11-30 PROCEDURE — 2700000000 HC OXYGEN THERAPY PER DAY

## 2020-11-30 PROCEDURE — 6370000000 HC RX 637 (ALT 250 FOR IP): Performed by: CLINICAL NURSE SPECIALIST

## 2020-11-30 PROCEDURE — 97535 SELF CARE MNGMENT TRAINING: CPT

## 2020-11-30 PROCEDURE — 6370000000 HC RX 637 (ALT 250 FOR IP): Performed by: INTERNAL MEDICINE

## 2020-11-30 PROCEDURE — 94640 AIRWAY INHALATION TREATMENT: CPT

## 2020-11-30 PROCEDURE — 80053 COMPREHEN METABOLIC PANEL: CPT

## 2020-11-30 PROCEDURE — 6370000000 HC RX 637 (ALT 250 FOR IP): Performed by: NURSE PRACTITIONER

## 2020-11-30 PROCEDURE — 2580000003 HC RX 258: Performed by: INTERNAL MEDICINE

## 2020-11-30 PROCEDURE — 6360000002 HC RX W HCPCS: Performed by: INTERNAL MEDICINE

## 2020-11-30 PROCEDURE — 82962 GLUCOSE BLOOD TEST: CPT

## 2020-11-30 RX ORDER — BUMETANIDE 1 MG/1
1 TABLET ORAL DAILY
Qty: 30 TABLET | Refills: 3 | Status: SHIPPED | OUTPATIENT
Start: 2020-12-01 | End: 2021-01-07 | Stop reason: SDUPTHER

## 2020-11-30 RX ORDER — ASPIRIN 81 MG/1
81 TABLET ORAL DAILY
Qty: 30 TABLET | Refills: 3 | Status: SHIPPED | OUTPATIENT
Start: 2020-12-01

## 2020-11-30 RX ORDER — SPIRONOLACTONE 25 MG/1
25 TABLET ORAL DAILY
Qty: 30 TABLET | Refills: 3 | Status: SHIPPED | OUTPATIENT
Start: 2020-12-01 | End: 2021-01-07 | Stop reason: SDUPTHER

## 2020-11-30 RX ADMIN — INSULIN LISPRO 2 UNITS: 100 INJECTION, SOLUTION INTRAVENOUS; SUBCUTANEOUS at 06:11

## 2020-11-30 RX ADMIN — BUDESONIDE 250 MCG: 0.25 SUSPENSION RESPIRATORY (INHALATION) at 08:42

## 2020-11-30 RX ADMIN — NYSTATIN 500000 UNITS: 100000 SUSPENSION ORAL at 08:08

## 2020-11-30 RX ADMIN — Medication 10 ML: at 08:09

## 2020-11-30 RX ADMIN — ATORVASTATIN CALCIUM 20 MG: 20 TABLET, FILM COATED ORAL at 08:08

## 2020-11-30 RX ADMIN — DOXYCYCLINE HYCLATE 100 MG: 100 CAPSULE ORAL at 08:08

## 2020-11-30 RX ADMIN — LISINOPRIL 40 MG: 20 TABLET ORAL at 08:08

## 2020-11-30 RX ADMIN — ARFORMOTEROL TARTRATE 15 MCG: 15 SOLUTION RESPIRATORY (INHALATION) at 08:42

## 2020-11-30 RX ADMIN — INSULIN LISPRO 8 UNITS: 100 INJECTION, SOLUTION INTRAVENOUS; SUBCUTANEOUS at 11:45

## 2020-11-30 RX ADMIN — CARVEDILOL 12.5 MG: 6.25 TABLET, FILM COATED ORAL at 08:08

## 2020-11-30 RX ADMIN — ASPIRIN 81 MG: 81 TABLET, COATED ORAL at 08:08

## 2020-11-30 RX ADMIN — BUMETANIDE 1 MG: 1 TABLET ORAL at 08:08

## 2020-11-30 RX ADMIN — SPIRONOLACTONE 25 MG: 25 TABLET ORAL at 08:08

## 2020-11-30 RX ADMIN — MICONAZOLE NITRATE: 20 POWDER TOPICAL at 08:09

## 2020-11-30 ASSESSMENT — PAIN SCALES - GENERAL: PAINLEVEL_OUTOF10: 0

## 2020-11-30 NOTE — PROGRESS NOTES
Nurse to nurse called to [facility].  Brant Gillis

## 2020-11-30 NOTE — CARE COORDINATION
11/30/2020  Social Work Discharge Planning: Sw set up ambulance transport [facility] for Pt to go to [address] today at [address] at [facility], spouse and nurse were notified. Electronically signed by WILMAR Roman on 11/30/2020 at 9:36 AM

## 2020-11-30 NOTE — PROGRESS NOTES
Department of Podiatry   Progress Note        Patient seen for venous stasis and stage 0 wound of the digit. He is awake and tolerating diet. No acute events over night. Denies n/v/f/c/d/sob/cp. No new pedal complaints. Past Medical History:        Diagnosis Date    COPD (chronic obstructive pulmonary disease) ([facility]     Dementia ([facility]     Diabetes mellitus ([facility]        Past Surgical History:    History reviewed. No pertinent surgical history. Medications Prior to Admission:    Medications Prior to Admission: metFORMIN (GLUCOPHAGE) 500 MG tablet, Take 500 mg by mouth 2 times daily (with meals)  glimepiride (AMARYL) 2 MG tablet, Take 2 mg by mouth every morning (before breakfast)  amLODIPine (NORVASC) 5 MG tablet, Take 5 mg by mouth daily  donepezil (ARICEPT) 10 MG tablet, Take 10 mg by mouth nightly  lovastatin (MEVACOR) 40 MG tablet, Take 40 mg by mouth nightly  escitalopram (LEXAPRO) 10 MG tablet, Take 10 mg by mouth daily  benazepril (LOTENSIN) 40 MG tablet, Take 40 mg by mouth daily    Allergies:  Patient has no known allergies. Social History:   · TOBACCO:   reports that he has quit smoking. He has never used smokeless tobacco.  · ETOH:   reports previous alcohol use. DRUGS:   Social History     Substance and Sexual Activity   Drug Use Never       Family History:   History reviewed. No pertinent family history. REVIEW OF SYSTEMS:    All pertinent positives and negatives as stated in the HPI       LOWER EXTREMITY EXAMINATION:   Dressings CDI with no strike through noted. Previous exam 11/29/20:    VASCULAR:  DP and PT pulses are  faint. CFT delayed B/L. Warm to warm from the tibial tuberosity to the distal aspect of the digits dorsally. No hair growth noted to the distal aspects dorsally. NEUROLOGIC:  Protective sensation is diminished b/l     DERM:  Small, pre-ulcerative lesion measuring about 0.5x0.5x0.2cm was noted to the plantar aspect of the first right digit.  Hyperkeratotic skin of 46%. 3. Intermediate risk study. VL LOWER EXTREMITY ARTERIAL SEGMENTAL PRESSURES W PPG BILATERAL   Final Result   1. Normal left ankle brachial index measuring 1. 14. The right PARAS could not   be calculated due to heart noncompressible vessels. Toe brachial indices   also could not be calculated. 2. Monophasic waveforms at the anterior tibial level bilaterally. Pulse   volume recordings are unremarkable apart from mildly blunted waveforms at the   ankle level bilaterally. 3. Toe waveforms are normal apart from moderate blunting in the right great   toe and severe blunting in the left 2nd toe. 4. If clinically indicated, duplex arterial sonography could be done for   further evaluation. CTA CHEST W CONTRAST   Final Result   1. Very vague ground-glass diffuse pulmonary infiltration suggestive of CHF. 2. 1.5 cm opacity within the left upper lobe noted on axial image 37. This   finding could represent pneumonia. Follow-up CT scan is recommended in 4-6   weeks to exclude an underlying lesion. 3. Bilateral pleural effusions, left greater than right small in size. 4. Minimal fluid is seen within the right major fissure. 5. 4.2 x 1.9 cm fatty lobule seen along the anterior chest wall. This could   represent focal intercostal fatty hyperplasia or lipoma. RECOMMENDATIONS:   Follow-up unenhanced CT scan of the thorax in 4-6 weeks. CT HEAD WO CONTRAST   Final Result   1. There is no acute intracranial abnormality. Specifically, there is no   intracranial hemorrhage. 2. Atrophy and periventricular leukomalacia,      CT CERVICAL SPINE WO CONTRAST   Final Result   No acute osseous abnormality of the cervical spine. XR CHEST PORTABLE   Final Result   Multifocal bilateral patchy pulmonary infiltrates. XR HIP BILATERAL W AP PELVIS (2 VIEWS)   Final Result   1. There is no right or left hip fracture dislocation   2. Mild degenerative changes of the hips.           Vitals:    BP (!) 142/77   Pulse 80   Temp 97.7 °F (36.5 °C) (Oral)   Resp 16   Ht 5' 11\" (1.803 m)   Wt (!) 328 lb 1.6 oz (148.8 kg)   SpO2 100%   BMI 45.76 kg/m²     LABS:   Recent Labs     11/27/20  1330   WBC 6.8   HGB 12.7   HCT 43.0        Recent Labs     11/30/20  0440      K 4.4   CL 96*   CO2 39*   BUN 16   CREATININE 0.9     Recent Labs     11/29/20  0815 11/30/20  0440   PROT 6.4 6.2*       ASSESSMENTS:   Active Problems:  1. Venous stasis  2. Pre-ulcerative lesion to right first digit  3. Chronic edema  4. DM II  5. Lymphedema        Pneumonia    Chronic obstructive pulmonary disease with acute exacerbation (HCC)    Controlled type 2 diabetes mellitus, with long-term current use of insulin (HCC)    Hypoxia    Scrotal edema    Lymphedema    Acute on chronic respiratory failure (HCC)    Fall    COPD exacerbation ([facility]    Uncontrolled type 2 diabetes mellitus with hyperglycemia ([facility]    Dementia without behavioral disturbance (HCC)         PLAN:  - Patient's labs, charts and images were reviewed today   - Wound cultures show heavy growth of staph aureus.  - Dressings: xeroform, DSD, ACE b/l. Daily dressing changes.  - No immediate podiatric surgical intervention indicated at this time. - Continue local wound care   - Patient is stable for discharge from podiatric standpoint once medically clear. - Patient to follow up with Dr. Olivier Pedraza in clinic 1 week after discharge   - Per case management, plan is for patient to be discharge at 1pm to [facility]    - Discussed patient with Dr. Olivier Pedraza   - Will continue to follow patient while they are in-house. Thank you for the opportunity to take part in the patient's care. Please do not hesitate to call for any questions or concerns.

## 2020-11-30 NOTE — PROGRESS NOTES
OT BEDSIDE TREATMENT NOTE      Date:2020  Patient Name: Michael Caban  MRN: [id number]  : 1945  Room: [address] [place] [address]     Referring Provider: JENNIFER Miguel; Patito Potts MD   Evaluating OT: Queta Mcmillan, OTR/L - [id number]     AM-PAC Daily Activity Raw Score: 1524      Recommended Adaptive Equipment: Continue to assess.      Diagnosis: Pneumonia [J18.9]     Pertinent Medical History: COPD, dementia, DM      Precautions: falls, skin integrity, O2     Home Living: Patient reported that he lives with his wife in a one-floor home (few steps to enter); patient's bedroom and bathroom are on the main living level. Patient noted that he does not have to access a basement. Bathroom Setup: walk-in shower (no seat, no grab bars) and standard-height toilet  Equipment Owned: N/A     Prior Level of Function (PLOF): Patient reported that he was independent with ADLs, but needed assistance with IADLs. Patient was independent with functional mobility (without device) prior to this hospitalization. Driving: Yes - patient's wife can assist with transportation     Pain Level: Patient denied pain. Cognition: Patient alert and grossly oriented     Functional Assessment:    Initial Eval Status  Date: 2020 Treatment Status   Short Term Goals   Feeding Setup Setup   Independent   Grooming Min A Setup to comb hair   SBA  (seated/standing)   UB Dressing Min A   Setup   LB Dressing Max A to adjust socks. Max A to don slipper socks.  Min A - with use of AE, as needed/appropriate   Bathing Mod A   Min A - with use of AE/DME, as needed/appropriate   Toileting Max A  Patient with rivas catheter currently. Max A hygiene after bowel movement. Catheter.   Min A   Bed Mobility  Supine-to-Sit: Mod A  Sit-to-Supine:  Mod A  Min A supine to sit       Functional Transfers Sit-to-Stand: Min A   from EOB  (elevated surface) Min A   Supervision   Functional Mobility Min A   (with walker) for few steps forward and backward.  min A pivot to chair using w/w for support. Supervision with functional mobility (with device, as needed/appropriate) in order to maximize independence with ADLs/IADLs and other functional tasks. Balance Sitting: Good  (at EOB)  Standing: Fair-  (with walker)   Fair+ dynamic standing balance during completion of ADLs/IADLs and other functional tasks. Activity Tolerance Fair Fair   Patient will demonstrate Good understanding and consistent implementation of energy conservation techniques and work simplification techniques into ADL/IADL routines. B UE Strength 4-/5   Patient will demonstrate 4+/5 B UE strength in order to maximize independence with ADLs/IADLs and functional transfers       Comments:  Pt remained in chair at end of the session. Pleasant and cooperative. Education/treatment:  ADL retraining with facilitation of movement to increase self care. Therapeutic activity to address balance and strength for ADL and transfers. Pt education of walker safety and transfer safety. · Pt has made  progress towards set goals. Plan of Care: 2-5 days/week for 1-2 weeks PRN  [x]? ?ADL retraining/adaptive techniques and AE recommendations to increase functional independence within precautions  [x]? ? Energy conservation techniques to improve tolerance for ADLs/IADLs  [x]? ? Functional transfer/mobility training/DME recommendations for increased independence, safety and fall prevention  [x]? ?Patient/family education to increase safety and functional independence  [x]? ? Environmental modifications for safe mobility and completion of ADLs/IADLs  []? ?Cognitive retraining exercises to improve problem solving skills & safe participation in ADLs/IADLs   []? ?Sensory re-education techniques to improve extremity awareness, maintain skin integrity and improve hand function   []? ? Visual/Perceptual retraining  to improve body awareness and safety during transfers and ADLs   []? ? Splinting/positioning needs to maintain joint/skin integrity and prevent contractures   [x]? ? Therapeutic activity to improve functional performance during ADLs/IADLs  [x]? ? Therapeutic exercise to improve tolerance and functional strength for ADLs/IADLs  [x]? ? Balance retraining/tolerance tasks for facilitation of postural control with dynamic challenges during ADLs   [x]? ? Neuromuscular re-education: facilitate righting/equilibrium reactions, midline orientation, scapular stability/mobility, normalize muscle tone, and facilitate active functional movement/attention  []? ? Delirium prevention/treatment   []? Positioning to improve functional independence and prevent skin breakdown   []? ?Other:     Time In: 8:05  Time Out: 8:20      Min Units   Therapeutic Ex 50224     Therapeutic Activities 65234 5    ADL/Self Care 63479 10 1   Orthotic Management 80071     Neuro Re-Ed 99429     Non-Billable Time     TOTAL TIMED TREATMENT 15 300 [facility]/ [id number]

## 2020-11-30 NOTE — PROGRESS NOTES
Examined at 0600 this AM    Subjective: The patient is awake. Carmen Treviño No problems overnight. Denies chest pain, angina, and dyspnea. Denies abdominal pain. Tolerating diet. No nausea or vomiting. Objective:    BP (!) 142/77   Pulse 80   Temp 97.7 °F (36.5 °C) (Oral)   Resp 16   Ht 5' 11\" (1.803 m)   Wt (!) 328 lb 1.6 oz (148.8 kg)   SpO2 100%   BMI 45.76 kg/m²     Heart:  RRR, no murmurs, gallops, or rubs.   Lungs:  CTA bilaterally, no wheeze, rales or rhonchi  Abd: bowel sounds present, nontender, nondistended, no masses      CBC:   Lab Results   Component Value Date    WBC 6.8 11/27/2020    RBC 4.90 11/27/2020    HGB 12.7 11/27/2020    HCT 43.0 11/27/2020    MCV 87.8 11/27/2020    MCH 25.9 11/27/2020    MCHC 29.5 11/27/2020    RDW 15.0 11/27/2020     11/27/2020    MPV 13.0 11/27/2020     CMP:    Lab Results   Component Value Date     11/30/2020    K 4.4 11/30/2020    K 4.6 11/22/2020    CL 96 11/30/2020    CO2 39 11/30/2020    BUN 16 11/30/2020    CREATININE 0.9 11/30/2020    GFRAA >60 11/30/2020    LABGLOM >60 11/30/2020    GLUCOSE 185 11/30/2020    PROT 6.2 11/30/2020    LABALBU 3.1 11/30/2020    CALCIUM 9.2 11/30/2020    BILITOT 0.3 11/30/2020    ALKPHOS 63 11/30/2020    AST 20 11/30/2020    ALT 26 11/30/2020     BMP:    Lab Results   Component Value Date     11/30/2020    K 4.4 11/30/2020    K 4.6 11/22/2020    CL 96 11/30/2020    CO2 39 11/30/2020    BUN 16 11/30/2020    LABALBU 3.1 11/30/2020    CREATININE 0.9 11/30/2020    CALCIUM 9.2 11/30/2020    GFRAA >60 11/30/2020    LABGLOM >60 11/30/2020    GLUCOSE 185 11/30/2020        Assessment:    Patient Active Problem List   Diagnosis    Pneumonia    Chronic obstructive pulmonary disease with acute exacerbation ([facility]    Controlled type 2 diabetes mellitus, with long-term current use of insulin ([facility]    Hypoxia    Scrotal edema    Lymphedema    Acute on chronic respiratory failure ([facility]    Fall    [facility]    Uncontrolled type 2 diabetes mellitus with hyperglycemia (HCC)    Dementia without behavioral disturbance (HCC)    Acute on chronic combined systolic and diastolic congestive heart failure ([facility]    Coronary artery disease involving native coronary artery of native heart without angina pectoris    Morbid obesity ([facility]       Plan:  Present tx. JESUS MANUEL bonner in progress. To ECF/Rehab.           Jacobo Lauren  12:20 PM  11/30/2020

## 2020-12-01 ENCOUNTER — TELEPHONE (OUTPATIENT)
Dept: CARDIOLOGY CLINIC | Age: 75
End: 2020-12-01

## 2020-12-01 NOTE — TELEPHONE ENCOUNTER
Spoke with patient's wife. Patient is an IP at a rehab facility in [address] [place] [address] and will be there for 3 weeks. She is not sure if he will be coming home or not, but does not wish to schedule HFU at this time. She will call once a decision has been made regarding patient status.

## 2020-12-07 LAB
Lab: NORMAL
REPORT: NORMAL
THIS TEST SENT TO: NORMAL

## 2020-12-19 NOTE — DISCHARGE SUMMARY
Physician Discharge Summary     Patient ID:  Ivelisse Bonner  [id number]  77 y.o.  1945    Admit date: 11/22/2020    Discharge date and time: 11/30/2020  3:03 PM     Admission Diagnoses: Pneumonia [J18.9]  Pneumonia [J18.9]    Discharge Diagnoses:  Acute on chronic hypoxic respiratory failure -history of COPD   Pneumonia    Chronic obstructive pulmonary disease with acute exacerbation ([facility]    Controlled type 2 diabetes mellitus, with long-term current use of insulin (HCC)    Hypoxia    Scrotal edema    Lymphedema    Acute on chronic respiratory failure ([facility]    Fall    COPD exacerbation (HCC)    Uncontrolled type 2 diabetes mellitus with hyperglycemia (HCC)    Dementia without behavioral disturbance ([facility]    Acute on chronic combined systolic and diastolic congestive heart failure ([facility]    Coronary artery disease involving native coronary artery of native heart without angina pectoris    Morbid obesity ([facility]    Acute on chronic respiratory failure with hypoxia/ hypercapnia: pt came in by EMS with questionable LOC. Per wife pt fell on floor while watching TV. Pt wears 3-4 L at baseline. Noted to have hypercapnia on arrival. C02 85. Pt placed on biPAP. Currently on baseline 4 L NC.     2. Pneumonia bacterial vs viral pneumonia: cxr showing multifocal infiltrates. Concern initially for COVID-19. Viral panel x 2 negative. CTA chest ordered and pending. Pulmonology following- appreciate input. Check procal. Continue IV antibiotics for now. Continue IV azithromycin/ IV rocepin. Started on decadron. Continue droplet plus isolation for now. 3. S/p fall: apparently pt was sitting in chair watching TV- he had fallen asleep in chair and was noted to be on the floor. Questionable LOC vs fall. CT head/ neck with no acute findings. 4. Rhabdomyolysis- mild: ? To fall. Noted to be 713 on arrival- trend     5. COPD with probable COPD exacerbation; pulmonology consulted. Steroids/ nebs     6.  Bilateral lower ext venous stasis, lymphedema and chronic wounds     7. Fluid overload: probnp 1973. Received lasix x1 on admission     8. Dementia: ongoing 7+ years. On aricept     9. Morbid obesity: per family pt is always wanting to ear      10. DM; continue meds. Check hg a1c     11. Scrotal edema: catheter placed     12. Candidiasis skin fold: pt was previously stated on diflucan     13. Possible UTI; UA / UC sent     14. HLd: statin     15. Metabolic encephalopathy: likely related to infection/ hypercarbia: pt awake/ alert. Oriented to person and place     16. Deconditioning: pt/ ot     17. Foot wounds; podiatry              Consults: cardiology, pulmonary/intensive care, vascular surgery and Podiatry    Procedures: Nuclear Medicine                            2-D echocardiogram     Echocardiogram:   11/24/2020     Summary   Technically suboptimal and limited study. Mildly dilated left ventricle. Micro-bubble contrast injected to enhance left ventricular visualization. No regional wall motion abnormalities seen. Moderately reduced left ventricular systolic dysfunction, EF 35 to 40%   The left atrium is borderline dilated. Moderately dilated right ventricle. Right ventricle global systolic function is reduced . Mildly enlarged right atrium size. Mild mitral annular calcification. Mild mitral regurgitation is present. The aortic valve appears moderately sclerotic. Mild tricuspid regurgitation. Pulmonary hypertension is mild . Dilation of the ascending aorta. Stress test:       NM Stress (11/27/2020)  Impression:   1. No reversible defects to suggest ischemia. 2. Dilated left ventricle, with global hypokinesis and EF of 46%. 3. Intermediate risk study. Hospital Course: This is a 71-year-old  man with a past medical history of COPD, venous stasis, lymphedema, chronic wounds, dementia, morbid obesity, chronic hypoxia, history of alcohol and nicotine use, JACEK. Admitted in from the ER today coming in via EMS services apparently the patient was at home resting in the chair watching TV he had been found by his wife on the floor after a fall I am unclear if he actually lost consciousness or fell asleep obtunded. He is got a history of COPD that is oxygen dependent about 3 L follows up at local pulmonary's. Upon arrival to the ER as above, CT the spine and CT the head unremarkable for acute findings bilateral hip x-rays unremarkable for acute pathology does show degenerative changes. Chest x-ray concerning for bilateral lower infiltrates and groundglass. Patient had blood culture sent and a COVID-19 array sent. BNP shows 1973 CK total 713. He was given azithromycin, ceftriaxone, Solu-Medrol 125 mg and a DuoNeb treatment. He is currently on 6 L nasal cannula oxygen. Acute on chronic respiratory failure with hypoxia/ hypercapnia: pt came in by EMS with questionable LOC. Per wife pt fell on floor while watching TV. Pt wears 3-4 L at baseline. Noted to have hypercapnia on arrival. C02 85. Pt placed on biPAP. Currently on baseline 4 L NC.     2. Pneumonia bacterial vs viral pneumonia: cxr showing multifocal infiltrates. Concern initially for COVID-19. Viral panel x 2 negative. CTA chest ordered and pending. Pulmonology following- appreciate input. Check procal. Continue IV antibiotics for now. Continue IV azithromycin/ IV rocepin. Started on decadron. Continue droplet plus isolation for now. 3. S/p fall: apparently pt was sitting in chair watching TV- he had fallen asleep in chair and was noted to be on the floor. Questionable LOC vs fall. CT head/ neck with no acute findings. 4. Rhabdomyolysis- mild: ? To fall. Noted to be 713 on arrival- trend     5. COPD with probable COPD exacerbation; pulmonology consulted. Steroids/ nebs     6. Bilateral lower ext venous stasis, lymphedema and chronic wounds     7. Fluid overload: probnp 1973.  Received lasix x1 on admission     8. Dementia: ongoing 7+ years. On aricept     9. Morbid obesity: per family pt is always wanting to ear      10. DM; continue meds. Check hg a1c     11. Scrotal edema: catheter placed     12. Candidiasis skin fold: pt was previously stated on diflucan     13. Possible UTI; UA / UC sent     14. HLd: statin     15. Metabolic encephalopathy: likely related to infection/ hypercarbia: pt awake/ alert. Oriented to person and place     16. Deconditioning: pt/ ot     17. Foot wounds; podiatry    The above Number 1-17 were admitting plans and observations. He improved relatively promptly with resp tx and diuresis. After [facility] arrangements arranged, pt DC'd to [facility]. Condition at discharge:  Stable    Discharge Exam:  See progress note from today    Disposition: Subacute Rehab    Patient Instructions:   Discharge Medication List as of 11/30/2020 11:15 AM      START taking these medications    Details   aspirin 81 MG EC tablet Take 1 tablet by mouth daily, Disp-30 tablet,R-3Normal      miconazole (MICOTIN) 2 % powder Apply topically 2 times daily. , Disp-45 g,R-1, Normal      bumetanide (BUMEX) 1 MG tablet Take 1 tablet by mouth daily, Disp-30 tablet,R-3Normal      spironolactone (ALDACTONE) 25 MG tablet Take 1 tablet by mouth daily, Disp-30 tablet,R-3Normal         CONTINUE these medications which have NOT CHANGED    Details   metFORMIN (GLUCOPHAGE) 500 MG tablet Take 500 mg by mouth 2 times daily (with meals)Historical Med      glimepiride (AMARYL) 2 MG tablet Take 2 mg by mouth every morning (before breakfast)Historical Med      amLODIPine (NORVASC) 5 MG tablet Take 5 mg by mouth dailyHistorical Med      donepezil (ARICEPT) 10 MG tablet Take 10 mg by mouth nightlyHistorical Med      lovastatin (MEVACOR) 40 MG tablet Take 40 mg by mouth nightlyHistorical Med      escitalopram (LEXAPRO) 10 MG tablet Take 10 mg by mouth dailyHistorical Med      benazepril (LOTENSIN) 40 MG tablet Take 40 mg by mouth

## 2020-12-22 ENCOUNTER — TELEPHONE (OUTPATIENT)
Dept: ADMINISTRATIVE | Age: 75
End: 2020-12-22
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
## 2020-12-22 NOTE — TELEPHONE ENCOUNTER
Faustina  at [facility] to schedule pts HFU. He will be discharging from their \"facility\" on Saturday - please return her call cell [phone].

## 2021-01-07 ENCOUNTER — OFFICE VISIT (OUTPATIENT)
Dept: CARDIOLOGY CLINIC | Age: 76
End: 2021-01-07
Payer: MEDICARE

## 2021-01-07 VITALS
OXYGEN SATURATION: 97 % | HEIGHT: 71 IN | RESPIRATION RATE: 22 BRPM | WEIGHT: 254.6 LBS | DIASTOLIC BLOOD PRESSURE: 60 MMHG | BODY MASS INDEX: 35.64 KG/M2 | HEART RATE: 85 BPM | SYSTOLIC BLOOD PRESSURE: 100 MMHG

## 2021-01-07 DIAGNOSIS — I50.20 HFREF (HEART FAILURE WITH REDUCED EJECTION FRACTION) (HCC): Primary | ICD-10-CM

## 2021-01-07 DIAGNOSIS — I25.10 CORONARY ARTERY DISEASE INVOLVING NATIVE CORONARY ARTERY OF NATIVE HEART WITHOUT ANGINA PECTORIS: ICD-10-CM

## 2021-01-07 DIAGNOSIS — I50.43 ACUTE ON CHRONIC COMBINED SYSTOLIC AND DIASTOLIC CONGESTIVE HEART FAILURE (HCC): ICD-10-CM

## 2021-01-07 PROCEDURE — 1036F TOBACCO NON-USER: CPT | Performed by: NURSE PRACTITIONER

## 2021-01-07 PROCEDURE — G8427 DOCREV CUR MEDS BY ELIG CLIN: HCPCS | Performed by: NURSE PRACTITIONER

## 2021-01-07 PROCEDURE — 4040F PNEUMOC VAC/ADMIN/RCVD: CPT | Performed by: NURSE PRACTITIONER

## 2021-01-07 PROCEDURE — 1123F ACP DISCUSS/DSCN MKR DOCD: CPT | Performed by: NURSE PRACTITIONER

## 2021-01-07 PROCEDURE — 3017F COLORECTAL CA SCREEN DOC REV: CPT | Performed by: NURSE PRACTITIONER

## 2021-01-07 PROCEDURE — G8484 FLU IMMUNIZE NO ADMIN: HCPCS | Performed by: NURSE PRACTITIONER

## 2021-01-07 PROCEDURE — 93000 ELECTROCARDIOGRAM COMPLETE: CPT | Performed by: INTERNAL MEDICINE

## 2021-01-07 PROCEDURE — G8417 CALC BMI ABV UP PARAM F/U: HCPCS | Performed by: NURSE PRACTITIONER

## 2021-01-07 PROCEDURE — 99214 OFFICE O/P EST MOD 30 MIN: CPT | Performed by: NURSE PRACTITIONER

## 2021-01-07 RX ORDER — METOPROLOL SUCCINATE 25 MG/1
25 TABLET, EXTENDED RELEASE ORAL DAILY
Qty: 90 TABLET | Refills: 3 | Status: SHIPPED
Start: 2021-01-07 | End: 2021-01-13 | Stop reason: DRUGHIGH

## 2021-01-07 RX ORDER — LISINOPRIL 10 MG/1
10 TABLET ORAL DAILY
Qty: 30 TABLET | Refills: 3 | Status: SHIPPED
Start: 2021-01-07 | End: 2021-04-07 | Stop reason: SDUPTHER

## 2021-01-07 RX ORDER — FLUTICASONE FUROATE AND VILANTEROL TRIFENATATE 100; 25 UG/1; UG/1
1 POWDER RESPIRATORY (INHALATION) DAILY
COMMUNITY

## 2021-01-07 RX ORDER — LORATADINE 10 MG/1
10 TABLET ORAL DAILY
COMMUNITY

## 2021-01-07 RX ORDER — M-VIT,TX,IRON,MINS/CALC/FOLIC 27MG-0.4MG
1 TABLET ORAL DAILY
COMMUNITY

## 2021-01-07 RX ORDER — BUMETANIDE 1 MG/1
1 TABLET ORAL DAILY
Qty: 30 TABLET | Refills: 3 | Status: SHIPPED | OUTPATIENT
Start: 2021-01-07

## 2021-01-07 RX ORDER — ALBUTEROL SULFATE 90 UG/1
2 AEROSOL, METERED RESPIRATORY (INHALATION) EVERY 6 HOURS PRN
COMMUNITY

## 2021-01-07 RX ORDER — SPIRONOLACTONE 25 MG/1
25 TABLET ORAL DAILY
Qty: 30 TABLET | Refills: 3 | Status: SHIPPED
Start: 2021-01-07 | End: 2022-08-12 | Stop reason: SINTOL

## 2021-01-07 SDOH — HEALTH STABILITY: MENTAL HEALTH: HOW OFTEN DO YOU HAVE A DRINK CONTAINING ALCOHOL?: NOT ASKED

## 2021-01-07 NOTE — PATIENT INSTRUCTIONS
1. Stop Amlodipine / Norvasc    2. Stop Benzapril     3. Start Metoprolol succinate (Toprol) 25 mg nightly    4. Start Lisinopril 10 mg daily     4. Continue Bumex and spironolactone     5. Get blood work per visiting RN next week ( [facility])    6. After review of your labs we will continue to adjust your heart failure medication    7. Follow up with Dr. Faisal Grove in 1 month    8. Weigh yourself daily    -Stay Hydrated    -Diet should sodium restricted to 2 grams    -Again watch your daily weight trends and if you gain water weight please follow below instructions.    -If you gain 3-5 pounds in 2-3 days OR notice that you are retaining fluid in anyway just like you did before then take an extra dose of your water pill (bumetanide/Bumex) every day until you lose the weight or feel better.    -If you notice that you have taken more than 3 extra doses in 1 week then please call and let us know. -If at any time you feel that you are retaining fluid, your medications are not working, or you feel ill in anyway, then please call us for either same day appointment or the next day, and for instructions. Our goal is to keep you out of the emergency room and the hospital and we have ways to do it. You just need to call us in a timely manner.     -If you become sick for other reasons, and notice that you are not urinating as much, the urine is very dark, you have significant diarrhea or vomiting, then please DO NOT take your water pill and CALL US immediately.

## 2021-01-07 NOTE — PROGRESS NOTES
[facility] Cardiology   Office Visit     Reason for Visit: Heart Failure    Primary Cardiologist: Dr. Charl Epley        History of Present Illness:     Mr. Heena Black is a 77year old male with PMHx of HFrEF (45-40%), diabetes mellitus, chronic lower extremity ulcers, COPD (on chronic O2 3-4L NC), former smoker, morbid obesity, JACEK (noncompliant with BiPAP), HTN, HLD, depression, dementia. He presented to [facility] on 11/22/2020 after a mechanical fall. Upon arrival to the ED his blood pressure was elevated and labs demonstrated proBNP 1973 and negative troponin. CT chest consistent with CHF. He was IV diuresed with an urinary output of 7 liters. During hospitalization he underwent TTE that demonstrated LVEF 35-40%, mild RV dysfunction, mild MR, mild TR. He was initiated on GDMT and underwent NM stress that was nonischemic. Upon discharge from the hospital he was placed in a rehab facility in [place] and has been home for a couple of weeks. He is here today for post hospital follow-up, since discharge he has been doing well with no complaints. Visiting nurses and PT/OT have been coming to the home and he is able to walk without SOB per wife. He ran out of his bumex/spironolactone approximately 3 days ago and his wife been giving him an over the counter herbal diuretic. Since initial hospitalization admission he has lost 65 lbs with improved symptoms. He chronic dyspnea with exertion and supplemental oxygen use, however denies increased shortness of breath, or decline in overall functional capacity. He denies orthopnea, PND, nocturnal cough or hemoptysis. He has known JACEK however refuses treatment. He denies abdominal distention or bloating, early satiety, anorexia/change in appetite, unintentional weight loss. He does lower extremity edema. He denies exertional lightheadedness. He denies palpitations, syncope or near syncope. Review of systems is negative for chest pain, pressure, discomfort.  When ambulating on an incline, He does not leg claudication. History is negative for neurological symptoms including transient loss of vision, asymmetric weakness, aphasia, dysphasia, numbness, tingling. Past medical, surgical, family and social histories have been reviewed. Any changes in the past medical history, social history or family history have been made and are reflected in the electronic medical record. Patient Active Problem List    Diagnosis Date Noted    Acute on chronic combined systolic and diastolic congestive heart failure ([address]     Coronary artery disease involving native coronary artery of native heart without angina pectoris     Morbid obesity ([address]     Pneumonia 11/22/2020    Chronic obstructive pulmonary disease with acute exacerbation ([address] 11/22/2020    Controlled type 2 diabetes mellitus, with long-term current use of insulin ([address] 11/22/2020    Hypoxia 11/22/2020    Scrotal edema 11/22/2020    Lymphedema 11/22/2020    Acute on chronic respiratory failure ([address] 11/22/2020    COPD exacerbation ([address]     Uncontrolled type 2 diabetes mellitus with hyperglycemia ([address]     Dementia without behavioral disturbance ([address]      Past Medical History:    1. COPD with chronic home oxygen (3-4 L). 2.  Former smoker  3. Dementia: On Aricept  4. NIDDM   5.  Morbid obesity: BMI 50.9  6. History of JACEK: Refuses BiPAP  7. Cardiomyopathy: Ischemic versus nonischemic ?  ? TTE: 11/24/2020: No wall motion abnormality, moderately reduced left ventricular systolic dysfunction, EF 72-71%, indeterminate diastolic function, moderately dilated right ventricle, right ventricle global systolic function is reduced (TAPSE 1.6 cm). Intra-atrial septum appears intact, mild MR, mild TR, mild pulmonary hypertension, RVSP 45 mmHg, no evidence of pericardial effusion. Dilatation of ascending aorta (3.9 cm)  8. Hyperlipidemia: On Lipitor  9.   Hypertension (on Norvasc and Benzapril prior to admission)  10. Depression      Allergies:  Patient has no known allergies.     Social History: Former smoker: 2 PPD x 20 years; quit 30 years ago. Denies alcohol and illicit drug use. Lives with his wife.     Family History:   Noncontributory due to advanced age.       Past Medical History:   Diagnosis Date    CAD (coronary artery disease)     [facility]     CHF (congestive heart failure) (HCC)     COPD (chronic obstructive pulmonary disease) (HCC)     COPD (chronic obstructive pulmonary disease) (HCC)     Dementia ([facility]     Diabetes mellitus ([facility]     Hypertension          Past Surgical History:   Procedure Laterality Date    TOE AMPUTATION         Family History   Problem Relation Age of Onset    Heart Attack Mother     Diabetes Mother     Diabetes Father        Social History     Socioeconomic History    Marital status:      Spouse name: None    Number of children: None    Years of education: None    Highest education level: None   Occupational History    None   Social Needs    Financial resource strain: None    Food insecurity     Worry: None     Inability: None    Transportation needs     Medical: None     Non-medical: None   Tobacco Use    Smoking status: Former Smoker     Packs/day: 3.00     Years: 10.00     Pack years: 30.00     Types: Cigarettes     Quit date:      Years since quittin.0    Smokeless tobacco: Never Used   Substance and Sexual Activity    Alcohol use: Not Currently     Comment: hx heavy drinking per wife / quit 17 yrs ago    Drug use: Never    Sexual activity: None   Lifestyle    Physical activity     Days per week: None     Minutes per session: None    Stress: None   Relationships    Social connections     Talks on phone: None     Gets together: None     Attends Jain service: None     Active member of club or organization: None     Attends meetings of clubs or organizations: None     Relationship status: None    Intimate partner violence     Fear of current or ex partner: None     Emotionally abused: None     Physically abused: None     Forced sexual activity: None   Other Topics Concern    None   Social History Narrative    Drinks 3 glasses diet coke       No Known Allergies        Guideline directed medical/device therapy:  ARNI/ACE I/ARB: No  Beta blocker:  No  Aldosterone antagonist:  Yes  ICD/CRT-P/-D:    QRS interval on recent ECG (personally reviewed/interpreted): <120 ms  Percentage RV pacing (personally reviewed/interpreted): %/NA      Review of Systems:   Cardiac: As per HPI  General: No fever, chills, rigors  Pulmonary: As per HPI  HEENT: No visual disturbances, difficult swallowing  GI: No nausea, vomiting, abdominal pain  : No dysuria or hematuria  Endocrine: No thyroid disease or diabetes  Musculoskeletal: BRYANT x 4, no focal motor deficits  Skin: Intact, no rashes  Neuro/Psych: No headache or seizures        Weights: Wt Readings from Last 3 Encounters:   01/07/21 254 lb 9.6 oz (115.5 kg)   11/30/20 (!) 328 lb 1.6 oz (148.8 kg)         Physical Examination:     /60   Pulse 85   Resp 22   Ht 5' 11\" (1.803 m)   Wt 254 lb 9.6 oz (115.5 kg)   SpO2 97% Comment: On 3L O2  BMI 35.51 kg/m²       CONSTITUTIONAL: Alert and oriented times 1 (Hx of dementia), no acute distress and cooperative to examination with proper mood and affect. SKIN: Skin color, texture, turgor normal. No rashes. BLE lesions. HEENT: Head is normocephalic, atraumatic. EOMI, PERRLA. NECK: Supple, symmetrical, trachea midline, no adenopathy, thyroid symmetric, not enlarged and no tenderness, skin normal. Unable to assess jvd. CHEST/LUNGS: Chest symmetric with normal A/P diameter, normal respiratory rate and rhythm, lungs clear to auscultation without wheezes, rales or rhonchi. No accessory muscle use. Scars None. Continuous oxygen in use at 3L.     CARDIOVASCULAR: Heart sounds are normal.  Regular rate and rhythm without murmur, gallop or rub. Normal S1 and S2. Carotid and 2+ and equal bilaterally. ABDOMEN: Obese. Normal bowel sounds. No bruits. soft, nondistended, no masses or organomegaly. no evidence of hernia. Tenderness: absent. RECTAL: deferred, not clinically indicated  NEUROLOGIC: Dementia. EXTREMITIES: No cyanosis, no clubbing. Statis dermatitis and + 1-2 lower extremity edema. All the following diagnostics were personally reviewed and interpreted by me. LAB DATA:     11/30/2020 04:40   Sodium 137   Potassium 4.4   Chloride 96 (L)   CO2 39 (H)   BUN 16   Creatinine 0.9   Anion Gap 2 (L)   GFR Non- >60   GFR African American >60   Glucose 185 (H)   Calcium 9.2   Total Protein 6.2 (L)   Albumin 3.1 (L)   Alk Phos 63   ALT 26   AST 20   Bilirubin 0.3         IMAGING:    CXR (11/27/2020)  FINDINGS:  Heart size is unable to be accurately assessed on this single portable view of the chest, but appears to be stable.  There is persistent prominence of the pulmonary vasculature with increased interstitial markings throughout both lungs.  No evidence of a pneumothorax. Difficult to exclude smallbilateral pleural effusions.  No acute osseous abnormality. Impression:   Stable findings suggestive of mild pulmonary edema. CXR (11/22/2020)  FINDINGS:  The heart is enlarged. Multifocal bilateral patchy ground-glass infiltrates are noted. Janie Kb is no pleural effusion. Janie Kb is no pneumothorax. Impression:  Multifocal bilateral patchy pulmonary infiltrates. CTA of chest (11/23/2020)  Impression:  1. Very vague ground-glass diffuse pulmonary infiltration suggestive of CHF. 2. 1.5 cm opacity within the left upper lobe noted on axial image 37. This finding could represent pneumonia.  Follow-up CT scan is recommended in 4-6 weeks to exclude an underlying lesion. 3. Bilateral pleural effusions, left greater than right small in size. 4. Minimal fluid is seen within the right major fissure.   5. 4.2 x 1.9 cm fatty lobule seen along the anterior chest wall.  This could represent focal intercostal fatty hyperplasia or lipoma. CARDIAC TESTING:    NM Stress (11/27/2020)  FINDINGS:  The technical quality of the study is fair. Rotating planar images demonstrate diaphragmatic attenuation and  increased GI uptake adjacent to the inferior wall. The heart is enlarged on both rest and stress images. Post stress tomographic images demonstrate a predominantly fixed mild  perfusion defect involving the apical inferior wall, possibly due to  diaphragmatic attenuation. There are no significant reversible  defects. Gated images demonstrate a dilated ventricle, with global hypokinesis  and estimated ejection fraction of 46%. End-diastolic volume is 548  mL. TID ratio is 1.33. Impression:  1. No reversible defects to suggest ischemia. 2. Dilated left ventricle, with global hypokinesis and EF of 46%. 3. Intermediate risk study. TTE (11/24/2020)  Summary  Technically suboptimal and limited study. Mildly dilated left ventricle. LVEF 35-40%  Micro-bubble contrast injected to enhance left ventricular visualization. No regional wall motion abnormalities seen. Moderately reduced left ventricular systolic dysfunction. The left atrium is borderline dilated. Moderately dilated right ventricle. Right ventricle global systolic function is reduced . Mildly enlarged right atrium size. Mild mitral annular calcification. Mild mitral regurgitation is present. The aortic valve appears moderately sclerotic. Mild tricuspid regurgitation. Pulmonary hypertension is mild . Dilation of the ascending aorta. EKG  NSR       ASSESSMENT:  1. Chronic HFrEF  2. ACC stage C / NYHA class II-III  3. Near euvolemic with some dependent lower extremity edema  4. Mixed cardiomyopathy, likely ischemic component (3 vessel calcification on CT)  5. LVEF 35-40%, LVEDD 6, LVMI 69  6. CAD - medically treated  7. HTN  8.  Chronic hypoxic respiratory failure with supplemental O2  9. T2DM - hgba1c 8.9  10. Chronic lower extremity ulcers - follows with podiatry   11. Obesity  12. JACEK (noncompliant)  13. Dementia      PLAN:  1. Patient was initiated on GDMT during hospitalization however was then transferred to SNF facility, during that period medications were changed however do not have documentation available to evaluate. He has a history of cardiomyopathy with LV dysfunction, therefore will adjust GDMT according to [facility] guidelines. 2. Stop Amlodipine / Norvasc    3. Stop Benzapril     4. Start Metoprolol succinate (Toprol) 25 mg nightly    5. Start Lisinopril 10 mg daily     6. Continue Bumex and spironolactone     7. Get blood work per visiting RN next week ([facility])    8. After review of your labs we will continue to adjust your heart failure medication as able. 7. Follow up with Dr. Nuris Zapata in 1 month    8. Weigh yourself daily    -Stay Hydrated    -Diet should sodium restricted to 2 grams    -Again watch your daily weight trends and if you gain water weight please follow below instructions.    -If you gain 3-5 pounds in 2-3 days OR notice that you are retaining fluid in anyway just like you did before then take an extra dose of your water pill (bumetanide/Bumex) every day until you lose the weight or feel better.    -If you notice that you have taken more than 3 extra doses in 1 week then please call and let us know. -If at any time you feel that you are retaining fluid, your medications are not working, or you feel ill in anyway, then please call us for either same day appointment or the next day, and for instructions. Our goal is to keep you out of the emergency room and the hospital and we have ways to do it.  You just need to call us in a timely manner.     -If you become sick for other reasons, and notice that you are not urinating as much, the urine is very dark, you have significant diarrhea or vomiting, then please DO NOT take your water pill and CALL US immediately. Calderon Zavala, BSN  Student Nurse Practitioner    Amanda Burnett APRN-CNP  [id number] [facility] Card[facility].

## 2021-01-07 NOTE — Clinical Note
Please reach out to THE [facility] -- Make sure they are completing HF assessments on patient with each visit. Also he is due to have labs drawn at home next week if those and vitals can please be sent to our office. Thank you.
no tinnitus

## 2021-01-11 ENCOUNTER — TELEPHONE (OUTPATIENT)
Dept: CARDIOLOGY CLINIC | Age: 76
End: 2021-01-11

## 2021-01-11 DIAGNOSIS — I50.20 HFREF (HEART FAILURE WITH REDUCED EJECTION FRACTION) (HCC): Primary | ICD-10-CM

## 2021-01-11 LAB
B-TYPE NATRIURETIC PEPTIDE: NORMAL
BUN BLDV-MCNC: 16 MG/DL
CALCIUM SERPL-MCNC: NORMAL MG/DL
CHLORIDE BLD-SCNC: NORMAL MMOL/L
CO2: NORMAL
CREAT SERPL-MCNC: 0.87 MG/DL
GFR CALCULATED: NORMAL
GLUCOSE BLD-MCNC: NORMAL MG/DL
POTASSIUM SERPL-SCNC: NORMAL MMOL/L
SODIUM BLD-SCNC: NORMAL MMOL/L

## 2021-01-11 NOTE — TELEPHONE ENCOUNTER
Andrew Zarco called with an update on the pt his bp was 92/60 today and hr was 92. She did draw the blood work that was requested this afternoon. Their number is [phone] if you have questions.

## 2021-01-13 ENCOUNTER — TELEPHONE (OUTPATIENT)
Dept: CARDIOLOGY CLINIC | Age: 76
End: 2021-01-13

## 2021-01-13 RX ORDER — METOPROLOL SUCCINATE 50 MG/1
50 TABLET, EXTENDED RELEASE ORAL DAILY
Qty: 90 TABLET | Refills: 1 | Status: SHIPPED
Start: 2021-01-13 | End: 2021-08-12 | Stop reason: SDUPTHER

## 2021-01-13 NOTE — TELEPHONE ENCOUNTER
MED toprol was just adjusted today this AM   This bp / hr goes to Monty Kim CNP. Will forward Frank's note. Patient doing well. Following next week Gabby Hinds for tentative titration needs again. Current Outpatient Medications   Medication Sig Dispense Refill    metoprolol succinate (TOPROL XL) 50 MG extended release tablet Take 1 tablet by mouth daily 90 tablet 1    loratadine (CLARITIN) 10 MG tablet Take 10 mg by mouth daily      fluticasone-vilanterol (BREO ELLIPTA) 100-25 MCG/INH AEPB inhaler Inhale 1 puff into the lungs daily      albuterol sulfate HFA (PROAIR HFA) 108 (90 Base) MCG/ACT inhaler Inhale 2 puffs into the lungs every 6 hours as needed for Wheezing      Multiple Vitamins-Minerals (THERAPEUTIC MULTIVITAMIN-MINERALS) tablet Take 1 tablet by mouth daily      bumetanide (BUMEX) 1 MG tablet Take 1 tablet by mouth daily 30 tablet 3    spironolactone (ALDACTONE) 25 MG tablet Take 1 tablet by mouth daily 30 tablet 3    lisinopril (PRINIVIL;ZESTRIL) 10 MG tablet Take 1 tablet by mouth daily 30 tablet 3    aspirin 81 MG EC tablet Take 1 tablet by mouth daily 30 tablet 3    miconazole (MICOTIN) 2 % powder Apply topically 2 times daily. (Patient taking differently: Apply topically 2 times daily prn) 45 g 1    metFORMIN (GLUCOPHAGE) 500 MG tablet Take by mouth Takes 2 tabs in am and 1 tab in pm      glimepiride (AMARYL) 2 MG tablet Take 2 mg by mouth 2 times daily       donepezil (ARICEPT) 10 MG tablet Take 10 mg by mouth nightly      lovastatin (MEVACOR) 40 MG tablet Take 40 mg by mouth nightly      escitalopram (LEXAPRO) 10 MG tablet Take 10 mg by mouth daily       No current facility-administered medications for this visit.

## 2021-01-13 NOTE — TELEPHONE ENCOUNTER
Wife Will make med changes today per Foster VARNER   And she says  visiting nurse returning today at 2pm    Per wife they made the med changes as instructed , she manages [facility] [employer] meds. No questinos on meds. Adjusted toprol dose today, no dizziness. She will use up the 25mg toprol by taking 2 tabs every night. New dose sent to pharmacy for 50mg daily (1 tab daily)     Andrew Pitts, [address] visiting nurse Parvin  Updated on med change metoprolol.

## 2021-01-13 NOTE — TELEPHONE ENCOUNTER
Thank you for update   Will continue to monitor closely and get follow up vitals at [place] [id number] visit next week.

## 2021-02-08 ENCOUNTER — OFFICE VISIT (OUTPATIENT)
Dept: CARDIOLOGY CLINIC | Age: 76
End: 2021-02-08
Payer: MEDICARE

## 2021-02-08 VITALS
BODY MASS INDEX: 34.16 KG/M2 | RESPIRATION RATE: 16 BRPM | WEIGHT: 244 LBS | HEIGHT: 71 IN | SYSTOLIC BLOOD PRESSURE: 112 MMHG | HEART RATE: 78 BPM | DIASTOLIC BLOOD PRESSURE: 78 MMHG

## 2021-02-08 DIAGNOSIS — I50.43 ACUTE ON CHRONIC COMBINED SYSTOLIC AND DIASTOLIC CONGESTIVE HEART FAILURE (HCC): Primary | ICD-10-CM

## 2021-02-08 PROCEDURE — 1123F ACP DISCUSS/DSCN MKR DOCD: CPT | Performed by: INTERNAL MEDICINE

## 2021-02-08 PROCEDURE — G8484 FLU IMMUNIZE NO ADMIN: HCPCS | Performed by: INTERNAL MEDICINE

## 2021-02-08 PROCEDURE — G8428 CUR MEDS NOT DOCUMENT: HCPCS | Performed by: INTERNAL MEDICINE

## 2021-02-08 PROCEDURE — 3017F COLORECTAL CA SCREEN DOC REV: CPT | Performed by: INTERNAL MEDICINE

## 2021-02-08 PROCEDURE — 93000 ELECTROCARDIOGRAM COMPLETE: CPT | Performed by: INTERNAL MEDICINE

## 2021-02-08 PROCEDURE — G8417 CALC BMI ABV UP PARAM F/U: HCPCS | Performed by: INTERNAL MEDICINE

## 2021-02-08 PROCEDURE — 1036F TOBACCO NON-USER: CPT | Performed by: INTERNAL MEDICINE

## 2021-02-08 PROCEDURE — 4040F PNEUMOC VAC/ADMIN/RCVD: CPT | Performed by: INTERNAL MEDICINE

## 2021-02-08 PROCEDURE — 99214 OFFICE O/P EST MOD 30 MIN: CPT | Performed by: INTERNAL MEDICINE

## 2021-02-08 NOTE — PROGRESS NOTES
OUTPATIENT CARDIOLOGY FOLLOW-UP    Name: Linda Turner    Age: 77 y.o. Primary Care Physician: Nolan Murry MD    Date of Service: 2/8/2021    Chief Complaint:   Chief Complaint   Patient presents with    Congestive Heart Failure     1 month check- Patient has no complaints. Interim History:   62-year-old male with cardiomyopathy EF 35 to 40%, presumed ischemic, diabetes, lower extremity ulcers, presenting for follow-up. Was admitted in November 2020 after a fall, found to have new onset CHF. He follow-up with Taty Rivero once, who put him back on GDMT that had been discontinued when he was sent to rehab. He presents today accompanied by his wife. Apparently for the past few days he has been very weak, difficulty ambulating, he uses a walker. He is usually able to get around on his own but has been very weak past few days. He denies specific complaints. No chest pain, shortness of breath, palpitations, syncope. He did fall off the bed the other day, unclear circumstances. He has lost about 100 pounds since his hospital discharge, his wife said she had locked the kitchen because he was eating uncontrollably. He is seeing Dr. Alec Kolb, apparently scheduled for a PET scan because of pulmonary nodules.     Review of Systems:   Negative except as described above    Past Medical History:  Past Medical History:   Diagnosis Date    CAD (coronary artery disease)     Cardiomyopathy ([address]     CHF (congestive heart failure) (HCC)     COPD (chronic obstructive pulmonary disease) (HCC)     COPD (chronic obstructive pulmonary disease) (HCC)     Dementia ([address]     [facility]     Hypertension        Past Surgical History:  Past Surgical History:   Procedure Laterality Date    TOE AMPUTATION         Family History:  Family History   Problem Relation Age of Onset    Heart Attack Mother     Diabetes Mother     Diabetes Father        Social History:  Social History     Tobacco Use    Smoking status: Former Smoker     Packs/day: 3.00     Years: 10.00     Pack years: 30.00     Types: Cigarettes     Quit date:      Years since quittin.1    Smokeless tobacco: Never Used   Substance Use Topics    Alcohol use: Not Currently     Comment: hx heavy drinking per wife / quit 17 yrs ago    Drug use: Never        Allergies:  No Known Allergies    Current Medications:    Current Outpatient Medications:     metoprolol succinate (TOPROL XL) 50 MG extended release tablet, Take 1 tablet by mouth daily, Disp: 90 tablet, Rfl: 1    loratadine (CLARITIN) 10 MG tablet, Take 10 mg by mouth daily, Disp: , Rfl:     fluticasone-vilanterol (BREO ELLIPTA) 100-25 MCG/INH AEPB inhaler, Inhale 1 puff into the lungs daily, Disp: , Rfl:     albuterol sulfate HFA (PROAIR HFA) 108 (90 Base) MCG/ACT inhaler, Inhale 2 puffs into the lungs every 6 hours as needed for Wheezing, Disp: , Rfl:     Multiple Vitamins-Minerals (THERAPEUTIC MULTIVITAMIN-MINERALS) tablet, Take 1 tablet by mouth daily, Disp: , Rfl:     bumetanide (BUMEX) 1 MG tablet, Take 1 tablet by mouth daily, Disp: 30 tablet, Rfl: 3    spironolactone (ALDACTONE) 25 MG tablet, Take 1 tablet by mouth daily, Disp: 30 tablet, Rfl: 3    lisinopril (PRINIVIL;ZESTRIL) 10 MG tablet, Take 1 tablet by mouth daily, Disp: 30 tablet, Rfl: 3    aspirin 81 MG EC tablet, Take 1 tablet by mouth daily, Disp: 30 tablet, Rfl: 3    miconazole (MICOTIN) 2 % powder, Apply topically 2 times daily.  (Patient taking differently: Apply topically 2 times daily prn), Disp: 45 g, Rfl: 1    metFORMIN (GLUCOPHAGE) 500 MG tablet, Take by mouth Takes 2 tabs in am and 1 tab in pm, Disp: , Rfl:     glimepiride (AMARYL) 2 MG tablet, Take 2 mg by mouth 2 times daily , Disp: , Rfl:     donepezil (ARICEPT) 10 MG tablet, Take 10 mg by mouth nightly, Disp: , Rfl:     lovastatin (MEVACOR) 40 MG tablet, Take 40 mg by mouth nightly, Disp: , Rfl:     escitalopram (LEXAPRO) 10 MG tablet, Take 10 mg by mouth daily, Disp: , Rfl:     Physical Exam:  /78   Pulse 78   Resp 16   Ht 5' 11\" (1.803 m)   Wt 244 lb (110.7 kg)   BMI 34.03 kg/m²   Wt Readings from Last 3 Encounters:   02/08/21 244 lb (110.7 kg)   01/07/21 254 lb 9.6 oz (115.5 kg)   11/30/20 (!) 328 lb 1.6 oz (148.8 kg)     Appearance: Awake, alert and oriented x 3, no acute respiratory distress  Skin: Intact, no rash  Head: Normocephalic, atraumatic  Eyes: EOMI, no conjunctival erythema  ENMT: No pharyngeal erythema, MMM, no rhinorrhea  Neck: Supple, no elevated JVP, no carotid bruits  Lungs: Clear to auscultation bilaterally. No wheezes, rales, or rhonchi. Cardiac: Regular rate and rhythm, +S1S2, no murmurs apparent  Abdomen: Soft, nontender, +bowel sounds  Extremities: Moves all extremities x 4, no lower extremity edema.   Some abrasions and scrapes to the left lower leg  Neurologic: No focal motor deficits apparent, normal mood and affect, alert and oriented x 3  Peripheral Pulses: Intact posterior tibial pulses bilaterally    Laboratory Tests:  Lab Results   Component Value Date    CREATININE 0.87 01/11/2021    BUN 16 01/11/2021     11/30/2020    K 4.4 11/30/2020    CL 96 (L) 11/30/2020    CO2 39 (H) 11/30/2020     Lab Results   Component Value Date    MG 2.1 11/23/2020     Lab Results   Component Value Date    WBC 6.8 11/27/2020    HGB 12.7 11/27/2020    HCT 43.0 11/27/2020    MCV 87.8 11/27/2020     11/27/2020     Lab Results   Component Value Date    ALT 26 11/30/2020    AST 20 11/30/2020    ALKPHOS 63 11/30/2020    BILITOT 0.3 11/30/2020     Lab Results   Component Value Date    CKTOTAL 428 (H) 11/23/2020    CKMB 16.0 (H) 11/23/2020    TROPONINI 0.02 11/23/2020    TROPONINI 0.02 11/23/2020    TROPONINI 0.03 11/22/2020     Lab Results   Component Value Date    INR 1.1 11/22/2020    PROTIME 12.1 11/22/2020     No results found for: TSHFT4, TSH  Lab Results   Component Value Date    LABA1C 8.9 (H) 11/23/2020 suggest ischemia. 2. Dilated left ventricle, with global hypokinesis and EF of 46%. 3. Intermediate risk study. Orders Placed This Encounter   Procedures    EKG 12 Lead        Requested Prescriptions      No prescriptions requested or ordered in this encounter        ASSESSMENT / PLAN:  1. Generalized weakness  2. Chronic heart failure with reduced ejection fraction  3. New onset cardiomyopathy, EF 35 to 40%. Likely ischemic in etiology, no active ischemia on stress imaging. 4. CAD with three-vessel coronary calcification noted on chest CT  5. Obesity BMI 34 kg/m²  6. Chronic hypoxic respiratory failure, on continuous O2  7. Poorly controlled diabetes  8. Hypertension  9. JACEK  10. Dementia  11. Weight loss   12. Pulmonary nodules, seeing pulmonary and PET scan pending to rule out malignancy    Recommendations:  Appears fairly stable from cardiac standpoint. Unclear etiology of his profound weakness and gait unsteadiness, but I do not see immediately apparent cardiac etiology. His neuro exam is nonfocal.  I did recommend a follow-up with Dr. Kem Kirby regarding this. · Continue guideline directed medical therapy with metoprolol, lisinopril and spironolactone  · Given soft blood pressure, will not uptitrate today  · Continue daily bumetanide  · Continue aspirin and lovastatin  · Follow-up with Dr. Kem Kirby and pulmonary as planned  · Aggressive risk factor modification  · Follow-up in 3 months or sooner if need arises    The patient's current medication list, allergies, problem list and results of all previously ordered testing were reviewed at today's visit.     Faye Pacheco MD, [facility]

## 2021-03-31 ENCOUNTER — IMMUNIZATION (OUTPATIENT)
Dept: PRIMARY CARE CLINIC | Age: 76
End: 2021-03-31
Payer: MEDICARE

## 2021-03-31 PROCEDURE — 91300 COVID-19, PFIZER VACCINE 30MCG/0.3ML DOSE: CPT | Performed by: INTERNAL MEDICINE

## 2021-03-31 PROCEDURE — 0001A COVID-19, PFIZER VACCINE 30MCG/0.3ML DOSE: CPT | Performed by: INTERNAL MEDICINE

## 2021-04-08 RX ORDER — LISINOPRIL 10 MG/1
10 TABLET ORAL DAILY
Qty: 90 TABLET | Refills: 3 | Status: SHIPPED
Start: 2021-04-08 | End: 2022-04-07 | Stop reason: SDUPTHER

## 2021-04-27 ENCOUNTER — IMMUNIZATION (OUTPATIENT)
Dept: PRIMARY CARE CLINIC | Age: 76
End: 2021-04-27
Payer: MEDICARE

## 2021-04-27 PROCEDURE — 91300 COVID-19, PFIZER VACCINE 30MCG/0.3ML DOSE: CPT | Performed by: INTERNAL MEDICINE

## 2021-04-27 PROCEDURE — 0002A COVID-19, PFIZER VACCINE 30MCG/0.3ML DOSE: CPT | Performed by: INTERNAL MEDICINE

## 2021-05-06 ENCOUNTER — TELEPHONE (OUTPATIENT)
Dept: ADMINISTRATIVE | Age: 76
End: 2021-05-06

## 2021-06-30 ENCOUNTER — OFFICE VISIT (OUTPATIENT)
Dept: CARDIOLOGY CLINIC | Age: 76
End: 2021-06-30
Payer: MEDICARE

## 2021-06-30 VITALS
HEART RATE: 76 BPM | RESPIRATION RATE: 16 BRPM | SYSTOLIC BLOOD PRESSURE: 114 MMHG | BODY MASS INDEX: 34.85 KG/M2 | WEIGHT: 248.9 LBS | HEIGHT: 71 IN | DIASTOLIC BLOOD PRESSURE: 62 MMHG

## 2021-06-30 DIAGNOSIS — I25.10 CORONARY ARTERY DISEASE INVOLVING NATIVE CORONARY ARTERY OF NATIVE HEART WITHOUT ANGINA PECTORIS: Primary | ICD-10-CM

## 2021-06-30 DIAGNOSIS — I50.42 CHRONIC COMBINED SYSTOLIC AND DIASTOLIC CONGESTIVE HEART FAILURE (HCC): ICD-10-CM

## 2021-06-30 PROCEDURE — G8417 CALC BMI ABV UP PARAM F/U: HCPCS | Performed by: INTERNAL MEDICINE

## 2021-06-30 PROCEDURE — 3017F COLORECTAL CA SCREEN DOC REV: CPT | Performed by: INTERNAL MEDICINE

## 2021-06-30 PROCEDURE — 1123F ACP DISCUSS/DSCN MKR DOCD: CPT | Performed by: INTERNAL MEDICINE

## 2021-06-30 PROCEDURE — 99214 OFFICE O/P EST MOD 30 MIN: CPT | Performed by: INTERNAL MEDICINE

## 2021-06-30 PROCEDURE — 93000 ELECTROCARDIOGRAM COMPLETE: CPT | Performed by: INTERNAL MEDICINE

## 2021-06-30 PROCEDURE — G8427 DOCREV CUR MEDS BY ELIG CLIN: HCPCS | Performed by: INTERNAL MEDICINE

## 2021-06-30 PROCEDURE — 1036F TOBACCO NON-USER: CPT | Performed by: INTERNAL MEDICINE

## 2021-06-30 PROCEDURE — 4040F PNEUMOC VAC/ADMIN/RCVD: CPT | Performed by: INTERNAL MEDICINE

## 2021-06-30 RX ORDER — LOPERAMIDE HYDROCHLORIDE 2 MG/1
2 CAPSULE ORAL 4 TIMES DAILY PRN
COMMUNITY

## 2021-06-30 NOTE — PROGRESS NOTES
OUTPATIENT CARDIOLOGY FOLLOW-UP    Name: Rizwan Horvath    Age: 77 y.o. Primary Care Physician: Supriya Wilkinson MD    Date of Service: 2021    Chief Complaint:   Chief Complaint   Patient presents with    3 Month Follow-Up    Congestive Heart Failure        Interim History:   80-year-old male with cardiomyopathy EF 35 to 40%, presumed ischemic, diabetes, lower extremity ulcers, presenting for follow-up. He presents today accompanied by his wife. He has been doing very well since last visit. He has had progressive dementia and his wife is taking good care of him. She has to lock the kitchen because he is always trying to eat, and if she is not watching carefully he will go put stuff in a frying pan and turn it on. Bumetanide was reduced to half pill, and his weakness improved. Denies chest pain, shortness of breath, lower extremity edema. He has no complaints but does not talk much, most history is obtained from the wife.     Review of Systems:   Negative except as described above    Past Medical History:  Past Medical History:   Diagnosis Date    CAD (coronary artery disease)     [facility]     CHF (congestive heart failure) (HCC)     COPD (chronic obstructive pulmonary disease) (HCC)     COPD (chronic obstructive pulmonary disease) (HCC)     Dementia [facility]     Diabetes mellitus ([facility]     Hypertension        Past Surgical History:  Past Surgical History:   Procedure Laterality Date    TOE AMPUTATION         Family History:  Family History   Problem Relation Age of Onset    Heart Attack Mother     Diabetes Mother     Diabetes Father        Social History:  Social History     Tobacco Use    Smoking status: Former Smoker     Packs/day: 3.00     Years: 10.00     Pack years: 30.00     Types: Cigarettes     Quit date:      Years since quittin.5    Smokeless tobacco: Never Used   Vaping Use    Vaping Use: Never used   Substance Use Topics    Alcohol use: Not Currently     Comment: hx heavy drinking per wife / quit 17 yrs ago    Drug use: Never        Allergies:  No Known Allergies    Current Medications:    Current Outpatient Medications:     loperamide (IMODIUM) 2 MG capsule, Take 2 mg by mouth 4 times daily as needed for Diarrhea, Disp: , Rfl:     lisinopril (PRINIVIL;ZESTRIL) 10 MG tablet, Take 1 tablet by mouth daily, Disp: 90 tablet, Rfl: 3    metoprolol succinate (TOPROL XL) 50 MG extended release tablet, Take 1 tablet by mouth daily, Disp: 90 tablet, Rfl: 1    loratadine (CLARITIN) 10 MG tablet, Take 10 mg by mouth daily, Disp: , Rfl:     fluticasone-vilanterol (BREO ELLIPTA) 100-25 MCG/INH AEPB inhaler, Inhale 1 puff into the lungs daily, Disp: , Rfl:     albuterol sulfate HFA (PROAIR HFA) 108 (90 Base) MCG/ACT inhaler, Inhale 2 puffs into the lungs every 6 hours as needed for Wheezing, Disp: , Rfl:     Multiple Vitamins-Minerals (THERAPEUTIC MULTIVITAMIN-MINERALS) tablet, Take 1 tablet by mouth daily, Disp: , Rfl:     bumetanide (BUMEX) 1 MG tablet, Take 1 tablet by mouth daily (Patient taking differently: Take 0.5 mg by mouth daily Indications: taking 1/2 pill daily now ), Disp: 30 tablet, Rfl: 3    spironolactone (ALDACTONE) 25 MG tablet, Take 1 tablet by mouth daily, Disp: 30 tablet, Rfl: 3    aspirin 81 MG EC tablet, Take 1 tablet by mouth daily, Disp: 30 tablet, Rfl: 3    miconazole (MICOTIN) 2 % powder, Apply topically 2 times daily.  (Patient taking differently: Apply topically 2 times daily prn), Disp: 45 g, Rfl: 1    metFORMIN (GLUCOPHAGE) 500 MG tablet, Take by mouth Takes 2 tabs in am and 1 tab in pm, Disp: , Rfl:     glimepiride (AMARYL) 2 MG tablet, Take 2 mg by mouth 2 times daily , Disp: , Rfl:     donepezil (ARICEPT) 10 MG tablet, Take 10 mg by mouth nightly, Disp: , Rfl:     lovastatin (MEVACOR) 40 MG tablet, Take 40 mg by mouth nightly, Disp: , Rfl:     escitalopram (LEXAPRO) 10 MG tablet, Take 10 mg by mouth daily, Disp: , Rfl:     Physical Exam:  /62   Pulse 76   Resp 16   Ht 5' 11\" (1.803 m)   Wt 248 lb 14.4 oz (112.9 kg)   BMI 34.71 kg/m²   Wt Readings from Last 3 Encounters:   06/30/21 248 lb 14.4 oz (112.9 kg)   02/08/21 244 lb (110.7 kg)   01/07/21 254 lb 9.6 oz (115.5 kg)     Appearance: Awake, alert and oriented x 3, on nasal cannula  Skin: Intact, no rash  Head: Normocephalic, atraumatic  Eyes: EOMI, no conjunctival erythema  ENMT: No pharyngeal erythema, MMM, no rhinorrhea  Neck: Supple, no elevated JVP, no carotid bruits  Lungs: Clear to auscultation bilaterally. No wheezes, rales, or rhonchi. Cardiac: Regular rate and rhythm, +S1S2, no murmurs apparent  Abdomen: Soft, nontender, +bowel sounds  Extremities: Moves all extremities x 4, no lower extremity edema.   Some abrasions and scrapes to the left lower leg  Neurologic: No focal motor deficits apparent, normal mood and affect, alert and oriented x 3  Peripheral Pulses: Intact posterior tibial pulses bilaterally    Laboratory Tests:  Lab Results   Component Value Date    CREATININE 0.87 01/11/2021    BUN 16 01/11/2021     11/30/2020    K 4.4 11/30/2020    CL 96 (L) 11/30/2020    CO2 39 (H) 11/30/2020     Lab Results   Component Value Date    MG 2.1 11/23/2020     Lab Results   Component Value Date    WBC 6.8 11/27/2020    HGB 12.7 11/27/2020    HCT 43.0 11/27/2020    MCV 87.8 11/27/2020     11/27/2020     Lab Results   Component Value Date    ALT 26 11/30/2020    AST 20 11/30/2020    ALKPHOS 63 11/30/2020    BILITOT 0.3 11/30/2020     Lab Results   Component Value Date    CKTOTAL 428 (H) 11/23/2020    CKMB 16.0 (H) 11/23/2020    TROPONINI 0.02 11/23/2020    TROPONINI 0.02 11/23/2020    TROPONINI 0.03 11/22/2020     Lab Results   Component Value Date    INR 1.1 11/22/2020    PROTIME 12.1 11/22/2020     No results found for: TSHFT4, TSH  Lab Results   Component Value Date    LABA1C 8.9 (H) 11/23/2020     No results found for: EAG  No results found for: CHOL  No results found for: TRIG  No results found for: HDL  No results found for: LDLCALC, LDLCHOLESTEROL  No results found for: LABVLDL, VLDL  No results found for: CHOLHDLRATIO  No results for input(s): PROBNP in the last 72 hours. Cardiac Tests:  ECG: Sinus rhythm 78 bpm.  Normal axis normal intervals. Nonspecific ST changes. 6/30/2021: Sinus rhythm 76 beats minute. Normal axis normal intervals. Nonspecific T wave flattening    CTA chest: 11/23/2020, personally reviewed demonstrating three-vessel coronary calcification       Impression:           1. Very vague ground-glass diffuse pulmonary infiltration suggestive of CHF. 2. 1.5 cm opacity within the left upper lobe noted on axial image 37.  This   finding could represent pneumonia.  Follow-up CT scan is recommended in 4-6   weeks to exclude an underlying lesion. 3. Bilateral pleural effusions, left greater than right small in size. 4. Minimal fluid is seen within the right major fissure. 5. 4.2 x 1.9 cm fatty lobule seen along the anterior chest wall.  This could   represent focal intercostal fatty hyperplasia or lipoma.    RECOMMENDATIONS:   Follow-up unenhanced CT scan of the thorax in 4-6 weeks.       Echocardiogram:   11/24/2020     Summary   Technically suboptimal and limited study.   Mildly dilated left ventricle.   Micro-bubble contrast injected to enhance left ventricular visualization.   No regional wall motion abnormalities seen.   Moderately reduced left ventricular systolic dysfunction, EF 35 to 40%   The left atrium is borderline dilated.   Moderately dilated right ventricle.   Right ventricle global systolic function is reduced .   Mildly enlarged right atrium size.   Mild mitral annular calcification.   Mild mitral regurgitation is present.   The aortic valve appears moderately sclerotic.   Mild tricuspid regurgitation.   Pulmonary hypertension is mild .   Dilation of the ascending aorta.     Stress test:       NM Stress

## 2021-06-30 NOTE — LETTER
[facility]  [address]  Phone: [phone]  Fax: [phone]    Vimal Snyder MD    June 30, 2021     Uziel Simpson MD  [address] [place], [place] [id number] [place] [address]    Patient: Karen Mars   MR Number: [id number]   YOB: 1945   Date of Visit: 6/30/2021       Dear Uziel Simpson:    Thank you for referring Karen Mars to me for evaluation/treatment. Below are the relevant portions of my assessment and plan of care. If you have questions, please do not hesitate to call me. I look forward to following Marradha Mendoza along with you.     Sincerely,    MD Vimal Becker MD

## 2021-08-12 DIAGNOSIS — I50.20 HFREF (HEART FAILURE WITH REDUCED EJECTION FRACTION) (HCC): ICD-10-CM

## 2021-08-12 RX ORDER — METOPROLOL SUCCINATE 50 MG/1
50 TABLET, EXTENDED RELEASE ORAL DAILY
Qty: 90 TABLET | Refills: 3 | Status: SHIPPED
Start: 2021-08-12 | End: 2022-05-16 | Stop reason: SDUPTHER

## 2022-02-01 ENCOUNTER — OFFICE VISIT (OUTPATIENT)
Dept: CARDIOLOGY CLINIC | Age: 77
End: 2022-02-01
Payer: MEDICARE

## 2022-02-01 ENCOUNTER — TELEPHONE (OUTPATIENT)
Dept: CARDIOLOGY CLINIC | Age: 77
End: 2022-02-01

## 2022-02-01 VITALS
RESPIRATION RATE: 16 BRPM | HEART RATE: 74 BPM | WEIGHT: 251.8 LBS | HEIGHT: 71 IN | SYSTOLIC BLOOD PRESSURE: 118 MMHG | BODY MASS INDEX: 35.25 KG/M2 | DIASTOLIC BLOOD PRESSURE: 62 MMHG

## 2022-02-01 DIAGNOSIS — I50.43 ACUTE ON CHRONIC COMBINED SYSTOLIC AND DIASTOLIC CONGESTIVE HEART FAILURE (HCC): Primary | ICD-10-CM

## 2022-02-01 DIAGNOSIS — I25.10 CORONARY ARTERY DISEASE INVOLVING NATIVE CORONARY ARTERY OF NATIVE HEART WITHOUT ANGINA PECTORIS: ICD-10-CM

## 2022-02-01 PROCEDURE — 4040F PNEUMOC VAC/ADMIN/RCVD: CPT | Performed by: INTERNAL MEDICINE

## 2022-02-01 PROCEDURE — 1036F TOBACCO NON-USER: CPT | Performed by: INTERNAL MEDICINE

## 2022-02-01 PROCEDURE — 1123F ACP DISCUSS/DSCN MKR DOCD: CPT | Performed by: INTERNAL MEDICINE

## 2022-02-01 PROCEDURE — G8484 FLU IMMUNIZE NO ADMIN: HCPCS | Performed by: INTERNAL MEDICINE

## 2022-02-01 PROCEDURE — G8427 DOCREV CUR MEDS BY ELIG CLIN: HCPCS | Performed by: INTERNAL MEDICINE

## 2022-02-01 PROCEDURE — G8417 CALC BMI ABV UP PARAM F/U: HCPCS | Performed by: INTERNAL MEDICINE

## 2022-02-01 PROCEDURE — 93000 ELECTROCARDIOGRAM COMPLETE: CPT | Performed by: INTERNAL MEDICINE

## 2022-02-01 PROCEDURE — 99214 OFFICE O/P EST MOD 30 MIN: CPT | Performed by: INTERNAL MEDICINE

## 2022-02-01 PROCEDURE — 3017F COLORECTAL CA SCREEN DOC REV: CPT | Performed by: INTERNAL MEDICINE

## 2022-02-01 NOTE — TELEPHONE ENCOUNTER
Please advise what happened to echo scheduling from order of June 30, 2021. Please schedule and advise.

## 2022-02-01 NOTE — PROGRESS NOTES
OUTPATIENT CARDIOLOGY FOLLOW-UP    Name: Vish Jacinto    Age: 77 y.o. Primary Care Physician: Siena Matthews MD    Date of Service: 2022    Chief Complaint:   Chief Complaint   Patient presents with    3 Month Follow-Up    Coronary Artery Disease        Interim History:   Here for follow-up of cardiomyopathy EF 35 to 40%, presumed ischemic, diabetes. He presents today accompanied by his wife. He has been doing very well since last visit. He has had progressive dementia and his wife continues to take good care of him. She has to lock the kitchen because he is always trying to eat. His weights been stable. Patient has no complaints, does not talk much. Wife is very pleased with his progress. Not very active physically, sits in his room and watches a lot of TV. No complaints. Echo was ordered in  but never scheduled.   Review of Systems:   Negative except as described above    Past Medical History:  Past Medical History:   Diagnosis Date    CAD (coronary artery disease)     Cardiomyopathy ([facility]     CHF (congestive heart failure) (HCC)     COPD (chronic obstructive pulmonary disease) (HCC)     COPD (chronic obstructive pulmonary disease) (HCC)     Dementia ([facility]     Diabetes mellitus ([facility]     Hypertension        Past Surgical History:  Past Surgical History:   Procedure Laterality Date    TOE AMPUTATION         Family History:  Family History   Problem Relation Age of Onset    Heart Attack Mother     Diabetes Mother     Diabetes Father        Social History:  Social History     Tobacco Use    Smoking status: Former Smoker     Packs/day: 3.00     Years: 10.00     Pack years: 30.00     Types: Cigarettes     Quit date:      Years since quittin.1    Smokeless tobacco: Never Used   Vaping Use    Vaping Use: Never used   Substance Use Topics    Alcohol use: Not Currently     Comment: hx heavy drinking per wife / quit 17 yrs ago    Drug use: Never        Allergies:  No Known Allergies    Current Medications:    Current Outpatient Medications:     metoprolol succinate (TOPROL XL) 50 MG extended release tablet, Take 1 tablet by mouth daily, Disp: 90 tablet, Rfl: 3    loperamide (IMODIUM) 2 MG capsule, Take 2 mg by mouth 4 times daily as needed for Diarrhea, Disp: , Rfl:     lisinopril (PRINIVIL;ZESTRIL) 10 MG tablet, Take 1 tablet by mouth daily, Disp: 90 tablet, Rfl: 3    loratadine (CLARITIN) 10 MG tablet, Take 10 mg by mouth daily, Disp: , Rfl:     fluticasone-vilanterol (BREO ELLIPTA) 100-25 MCG/INH AEPB inhaler, Inhale 1 puff into the lungs daily, Disp: , Rfl:     albuterol sulfate HFA (PROAIR HFA) 108 (90 Base) MCG/ACT inhaler, Inhale 2 puffs into the lungs every 6 hours as needed for Wheezing, Disp: , Rfl:     Multiple Vitamins-Minerals (THERAPEUTIC MULTIVITAMIN-MINERALS) tablet, Take 1 tablet by mouth daily, Disp: , Rfl:     bumetanide (BUMEX) 1 MG tablet, Take 1 tablet by mouth daily (Patient taking differently: Take 0.5 mg by mouth daily Indications: taking 1/2 pill daily now ), Disp: 30 tablet, Rfl: 3    spironolactone (ALDACTONE) 25 MG tablet, Take 1 tablet by mouth daily, Disp: 30 tablet, Rfl: 3    aspirin 81 MG EC tablet, Take 1 tablet by mouth daily, Disp: 30 tablet, Rfl: 3    miconazole (MICOTIN) 2 % powder, Apply topically 2 times daily.  (Patient taking differently: Apply topically 2 times daily prn), Disp: 45 g, Rfl: 1    metFORMIN (GLUCOPHAGE) 500 MG tablet, Take by mouth Takes 2 tabs in am and 1 tab in pm, Disp: , Rfl:     glimepiride (AMARYL) 2 MG tablet, Take 2 mg by mouth 2 times daily , Disp: , Rfl:     donepezil (ARICEPT) 10 MG tablet, Take 10 mg by mouth nightly, Disp: , Rfl:     lovastatin (MEVACOR) 40 MG tablet, Take 40 mg by mouth nightly, Disp: , Rfl:     escitalopram (LEXAPRO) 10 MG tablet, Take 10 mg by mouth daily, Disp: , Rfl:     Physical Exam:  /62   Pulse 74   Resp 16   Ht 5' 11\" (1.803 m)   Wt 251 lb 12.8 oz (114.2 kg) BMI 35.12 kg/m²   Wt Readings from Last 3 Encounters:   02/01/22 251 lb 12.8 oz (114.2 kg)   06/30/21 248 lb 14.4 oz (112.9 kg)   02/08/21 244 lb (110.7 kg)     Appearance: Awake, alert and oriented x 3, on nasal cannula  Skin: Intact, no rash  Head: Normocephalic, atraumatic  Eyes: EOMI, no conjunctival erythema  ENMT: No pharyngeal erythema, MMM, no rhinorrhea  Neck: Supple, no elevated JVP, no carotid bruits  Lungs: Clear to auscultation bilaterally. No wheezes, rales, or rhonchi. Cardiac: Regular rate and rhythm, +S1S2, no murmurs apparent  Abdomen: Soft, nontender, +bowel sounds  Extremities: Moves all extremities x 4, no lower extremity edema.   Neurologic: No focal motor deficits apparent, normal mood and affect, alert and oriented x 3  Peripheral Pulses: Intact posterior tibial pulses bilaterally    Laboratory Tests:  Lab Results   Component Value Date    CREATININE 0.87 01/11/2021    BUN 16 01/11/2021     11/30/2020    K 4.4 11/30/2020    CL 96 (L) 11/30/2020    CO2 39 (H) 11/30/2020     Lab Results   Component Value Date    MG 2.1 11/23/2020     Lab Results   Component Value Date    WBC 6.8 11/27/2020    HGB 12.7 11/27/2020    HCT 43.0 11/27/2020    MCV 87.8 11/27/2020     11/27/2020     Lab Results   Component Value Date    ALT 26 11/30/2020    AST 20 11/30/2020    ALKPHOS 63 11/30/2020    BILITOT 0.3 11/30/2020     Lab Results   Component Value Date    CKTOTAL 428 (H) 11/23/2020    CKMB 16.0 (H) 11/23/2020    TROPONINI 0.02 11/23/2020    TROPONINI 0.02 11/23/2020    TROPONINI 0.03 11/22/2020     Lab Results   Component Value Date    INR 1.1 11/22/2020    PROTIME 12.1 11/22/2020     No results found for: TSHFT4, TSH  Lab Results   Component Value Date    LABA1C 8.9 (H) 11/23/2020     No results found for: EAG  No results found for: CHOL  No results found for: TRIG  No results found for: HDL  No results found for: LDLCALC, LDLCHOLESTEROL  No results found for: LABVLDL, VLDL  No results found for: CHOLHDLRATIO  No results for input(s): PROBNP in the last 72 hours. Cardiac Tests:  EC2022: Sinus rhythm 74 beats minute. Normal axis normal intervals. Nonspecific T wave changes    CTA chest: 2020, personally reviewed demonstrating three-vessel coronary calcification       Impression:           1. Very vague ground-glass diffuse pulmonary infiltration suggestive of CHF. 2. 1.5 cm opacity within the left upper lobe noted on axial image 37.  This   finding could represent pneumonia.  Follow-up CT scan is recommended in 4-6   weeks to exclude an underlying lesion. 3. Bilateral pleural effusions, left greater than right small in size. 4. Minimal fluid is seen within the right major fissure. 5. 4.2 x 1.9 cm fatty lobule seen along the anterior chest wall.  This could   represent focal intercostal fatty hyperplasia or lipoma. RECOMMENDATIONS:   Follow-up unenhanced CT scan of the thorax in 4-6 weeks.       Echocardiogram:   2020     Summary   Technically suboptimal and limited study.   Mildly dilated left ventricle.   Micro-bubble contrast injected to enhance left ventricular visualization.   No regional wall motion abnormalities seen.   Moderately reduced left ventricular systolic dysfunction, EF 35 to 40%   The left atrium is borderline dilated.   Moderately dilated right ventricle.   Right ventricle global systolic function is reduced .   Mildly enlarged right atrium size.   Mild mitral annular calcification.   Mild mitral regurgitation is present.   The aortic valve appears moderately sclerotic.   Mild tricuspid regurgitation.   Pulmonary hypertension is mild .   Dilation of the ascending aorta.     Stress test:       NM Stress (2020)  Impression:   1. No reversible defects to suggest ischemia. 2. Dilated left ventricle, with global hypokinesis and EF of 46%. 3. Intermediate risk study.        Orders Placed This Encounter   Procedures    EKG 12 lead        Requested Prescriptions      No prescriptions requested or ordered in this encounter        ASSESSMENT / PLAN:  1. Chronic heart failure with reduced ejection fraction, euvolemic  2. Cardiomyopathy, EF 35 to 40%. Likely ischemic in etiology, no active ischemia on stress imaging, no anginal symptoms. 3. CAD with three-vessel coronary calcification noted on chest CT  4. Obesity BMI 35 kg/m²  5. Chronic hypoxic respiratory failure, on continuous O2  6. Poorly controlled diabetes  7. Hypertension, well controlled  8. JACEK  9. Dementia, progressive  10. Weight loss   11. Pulmonary nodules, negative for malignancy    Recommendations:  Continues to do very well from a cardiac standpoint. · Continue guideline directed medical therapy with metoprolol, lisinopril and spironolactone  · Check echocardiogram to see if EF has improved on guideline therapy  · Continue daily bumetanide, 0.5 mg  · Continue aspirin and lovastatin  · Has upcoming appointment with Dr. Jules Martini, will request blood work to monitor kidney function and electrolytes  · Aggressive risk factor modification  · Increase physical activity as able  · Follow-up in 6 months or sooner if need arises    The patient's current medication list, allergies, problem list and results of all previously ordered testing were reviewed at today's visit.     Dayna Ann MD, [facility]

## 2022-02-02 ENCOUNTER — TELEPHONE (OUTPATIENT)
Dept: CARDIOLOGY | Age: 77
End: 2022-02-02

## 2022-02-07 ENCOUNTER — TELEPHONE (OUTPATIENT)
Dept: CARDIOLOGY | Age: 77
End: 2022-02-07

## 2022-02-07 NOTE — TELEPHONE ENCOUNTER
SCHEDULED ECHO FOR 03-02-22. REVIEWED COVID CHECKLIST WITH PATIENT.     Electronically signed by Sivakumar Wan on 2/7/2022 at 1:23 PM

## 2022-02-08 NOTE — PROCEDURES
Pharmacologic Nuclear Stress Test    Date: 11/27/2020    Indication: Cardiomyopathy    Description of procedure:    Protocol: Regadenoson stress SPECT myocardial perfusion imaging    Baseline EKG: SR 87 bpm with aberrant PACs. Nonspecific ST/T changes  Baseline BP: 122/82 mmHg    0.4 mg of regadenoson was injected followed by radiotracer injection. Patient reported no chest pain. Stress EKG showed no evidence of ischemia, and no arrhythmias were noted. Impression:  1. No evidence of regadenoson induced ischemia on stress EKG. 2. Nuclear images to be reported separately.     Linda Bryant MD, [address] [facility] Cardiology Detail Level: Zone Detail Level: Detailed The patient has been re-examined and I agree with the above assessment or I updated with my findings.

## 2022-03-02 ENCOUNTER — HOSPITAL ENCOUNTER (OUTPATIENT)
Dept: CARDIOLOGY | Age: 77
Discharge: HOME | End: 2022-03-02
Payer: MEDICARE

## 2022-03-02 DIAGNOSIS — I50.42 CHRONIC COMBINED SYSTOLIC AND DIASTOLIC CONGESTIVE HEART FAILURE (HCC): ICD-10-CM

## 2022-03-02 LAB
LV EF: 48 %
LVEF MODALITY: NORMAL

## 2022-03-02 PROCEDURE — 6360000004 HC RX CONTRAST MEDICATION: Performed by: INTERNAL MEDICINE

## 2022-03-02 PROCEDURE — 93306 TTE W/DOPPLER COMPLETE: CPT

## 2022-03-02 RX ADMIN — PERFLUTREN 1.1 MG: 6.52 INJECTION, SUSPENSION INTRAVENOUS at 14:58

## 2022-03-10 ENCOUNTER — TELEPHONE (OUTPATIENT)
Dept: CARDIOLOGY CLINIC | Age: 77
End: 2022-03-10

## 2022-03-10 NOTE — TELEPHONE ENCOUNTER
Left message for patient to contact office. ----- Message from Milton Kendall MD sent at 3/10/2022 10:48 AM EST -----  Echo looks good shows improved EF, it is still mildly reduced at 45 to 50% which is an improvement.   Continue current medications and follow-up as scheduled

## 2022-03-10 NOTE — RESULT ENCOUNTER NOTE
Echo looks good shows improved EF, it is still mildly reduced at 45 to 50% which is an improvement.   Continue current medications and follow-up as scheduled

## 2022-03-11 NOTE — TELEPHONE ENCOUNTER
Patient returned our call and was given results and recommendations per Dr. Jane Chong. He verbalized understanding.

## 2022-04-12 RX ORDER — LISINOPRIL 10 MG/1
10 TABLET ORAL DAILY
Qty: 30 TABLET | Refills: 5 | Status: SHIPPED
Start: 2022-04-12 | End: 2022-10-11 | Stop reason: SDUPTHER

## 2022-05-16 DIAGNOSIS — I50.20 HFREF (HEART FAILURE WITH REDUCED EJECTION FRACTION) (HCC): ICD-10-CM

## 2022-05-16 RX ORDER — METOPROLOL SUCCINATE 50 MG/1
50 TABLET, EXTENDED RELEASE ORAL DAILY
Qty: 30 TABLET | Refills: 5 | Status: SHIPPED
Start: 2022-05-16 | End: 2022-10-31 | Stop reason: SDUPTHER

## 2022-08-12 ENCOUNTER — OFFICE VISIT (OUTPATIENT)
Dept: CARDIOLOGY CLINIC | Age: 77
End: 2022-08-12
Payer: MEDICARE

## 2022-08-12 VITALS
WEIGHT: 247.8 LBS | DIASTOLIC BLOOD PRESSURE: 50 MMHG | BODY MASS INDEX: 34.69 KG/M2 | SYSTOLIC BLOOD PRESSURE: 80 MMHG | RESPIRATION RATE: 12 BRPM | HEIGHT: 71 IN | HEART RATE: 76 BPM

## 2022-08-12 DIAGNOSIS — I42.9 CARDIOMYOPATHY, UNSPECIFIED TYPE (HCC): ICD-10-CM

## 2022-08-12 DIAGNOSIS — I95.9 HYPOTENSION, UNSPECIFIED HYPOTENSION TYPE: ICD-10-CM

## 2022-08-12 DIAGNOSIS — I25.10 CORONARY ARTERY DISEASE INVOLVING NATIVE CORONARY ARTERY OF NATIVE HEART WITHOUT ANGINA PECTORIS: ICD-10-CM

## 2022-08-12 DIAGNOSIS — I50.42 CHRONIC COMBINED SYSTOLIC AND DIASTOLIC CONGESTIVE HEART FAILURE (HCC): Primary | ICD-10-CM

## 2022-08-12 PROCEDURE — 1036F TOBACCO NON-USER: CPT | Performed by: INTERNAL MEDICINE

## 2022-08-12 PROCEDURE — G8417 CALC BMI ABV UP PARAM F/U: HCPCS | Performed by: INTERNAL MEDICINE

## 2022-08-12 PROCEDURE — 3017F COLORECTAL CA SCREEN DOC REV: CPT | Performed by: INTERNAL MEDICINE

## 2022-08-12 PROCEDURE — 1123F ACP DISCUSS/DSCN MKR DOCD: CPT | Performed by: INTERNAL MEDICINE

## 2022-08-12 PROCEDURE — G8427 DOCREV CUR MEDS BY ELIG CLIN: HCPCS | Performed by: INTERNAL MEDICINE

## 2022-08-12 PROCEDURE — 99214 OFFICE O/P EST MOD 30 MIN: CPT | Performed by: INTERNAL MEDICINE

## 2022-08-12 PROCEDURE — 93000 ELECTROCARDIOGRAM COMPLETE: CPT | Performed by: INTERNAL MEDICINE

## 2022-08-12 NOTE — PROGRESS NOTES
OUTPATIENT CARDIOLOGY FOLLOW-UP    Name: Jluis Moss    Age: 77 y.o. Primary Care Physician: Preet Mathis MD    Date of Service: 2022    Chief Complaint:   Chief Complaint   Patient presents with    Congestive Heart Failure        Interim History:   Here for follow-up of cardiomyopathy EF 35 to 40%, presumed ischemic, diabetes. Recent echo showed improvement of EF on GDMT 45-50%. He presents today accompanied by his wife. Continues to do well. Denies any complaints. No chest pain, shortness of breath, dizziness lightheadedness or syncope. No lower extremity edema. Wears oxygen at home. Has some dementia and impulsive behavior, she has to keep the kitchen locked to bring him from eating continuously. She try to limit his fluid intake quite a bit as well.     Review of Systems:   Negative except as described above    Past Medical History:  Past Medical History:   Diagnosis Date    CAD (coronary artery disease)     Cardiomyopathy ([facility]     CHF (congestive heart failure) (HCC)     COPD (chronic obstructive pulmonary disease) (HCC)     COPD (chronic obstructive pulmonary disease) (HCC)     Dementia ([facility]     Diabetes mellitus ([facility]     Hypertension        Past Surgical History:  Past Surgical History:   Procedure Laterality Date    TOE AMPUTATION         Family History:  Family History   Problem Relation Age of Onset    Heart Attack Mother     Diabetes Mother     Diabetes Father        Social History:  Social History     Tobacco Use    Smoking status: Former     Packs/day: 3.00     Years: 10.00     Pack years: 30.00     Types: Cigarettes     Quit date:      Years since quittin.6    Smokeless tobacco: Never   Vaping Use    Vaping Use: Never used   Substance Use Topics    Alcohol use: Not Currently     Comment: hx heavy drinking per wife / quit 17 yrs ago    Drug use: Never        Allergies:  No Known Allergies    Current Medications:    Current Outpatient Medications:     metoprolol succinate (TOPROL XL) 50 MG extended release tablet, Take 1 tablet by mouth daily, Disp: 30 tablet, Rfl: 5    lisinopril (PRINIVIL;ZESTRIL) 10 MG tablet, Take 1 tablet by mouth daily, Disp: 30 tablet, Rfl: 5    loperamide (IMODIUM) 2 MG capsule, Take 2 mg by mouth 4 times daily as needed for Diarrhea, Disp: , Rfl:     loratadine (CLARITIN) 10 MG tablet, Take 10 mg by mouth daily, Disp: , Rfl:     fluticasone-vilanterol (BREO ELLIPTA) 100-25 MCG/INH AEPB inhaler, Inhale 1 puff into the lungs daily, Disp: , Rfl:     Multiple Vitamins-Minerals (THERAPEUTIC MULTIVITAMIN-MINERALS) tablet, Take 1 tablet by mouth daily, Disp: , Rfl:     bumetanide (BUMEX) 1 MG tablet, Take 1 tablet by mouth daily (Patient taking differently: Take 0.5 mg by mouth in the morning. Indications: taking 1/2 pill daily now.), Disp: 30 tablet, Rfl: 3    aspirin 81 MG EC tablet, Take 1 tablet by mouth daily, Disp: 30 tablet, Rfl: 3    metFORMIN (GLUCOPHAGE) 500 MG tablet, Take by mouth Takes 2 tabs in am and 1 tab in pm, Disp: , Rfl:     glimepiride (AMARYL) 2 MG tablet, Take 2 mg by mouth 2 times daily , Disp: , Rfl:     donepezil (ARICEPT) 10 MG tablet, Take 10 mg by mouth nightly, Disp: , Rfl:     lovastatin (MEVACOR) 40 MG tablet, Take 40 mg by mouth nightly, Disp: , Rfl:     escitalopram (LEXAPRO) 10 MG tablet, Take 10 mg by mouth daily, Disp: , Rfl:     albuterol sulfate HFA (PROAIR HFA) 108 (90 Base) MCG/ACT inhaler, Inhale 2 puffs into the lungs every 6 hours as needed for Wheezing, Disp: , Rfl:     miconazole (MICOTIN) 2 % powder, Apply topically 2 times daily.  (Patient not taking: Reported on 8/12/2022), Disp: 45 g, Rfl: 1    Physical Exam:  BP (!) 80/50   Pulse 76   Resp 12   Ht 5' 11\" (1.803 m)   Wt 247 lb 12.8 oz (112.4 kg)   BMI 34.56 kg/m²   Wt Readings from Last 3 Encounters:   08/12/22 247 lb 12.8 oz (112.4 kg)   02/01/22 251 lb 12.8 oz (114.2 kg)   06/30/21 248 lb 14.4 oz (112.9 kg)     Blood pressure on my repeat 90/50    Appearance: Awake, alert and oriented x 3,  Skin: Intact, no rash  Head: Normocephalic, atraumatic  Eyes: EOMI, no conjunctival erythema  ENMT: No pharyngeal erythema, MMM, no rhinorrhea  Neck: Supple, no elevated JVP, no carotid bruits  Lungs: Clear to auscultation bilaterally. No wheezes, rales, or rhonchi. Cardiac: Regular rate and rhythm, +S1S2, no murmurs apparent  Abdomen: Soft, nontender, +bowel sounds  Extremities: Moves all extremities x 4, no lower extremity edema. Neurologic: No focal motor deficits apparent, normal mood and affect, alert and oriented x 3  Peripheral Pulses: Intact posterior tibial pulses bilaterally    Laboratory Tests:  Lab Results   Component Value Date    CREATININE 0.87 2021    BUN 16 2021     2020    K 4.4 2020    CL 96 (L) 2020    CO2 39 (H) 2020     Lab Results   Component Value Date/Time    MG 2.1 2020 06:00 AM     Lab Results   Component Value Date    WBC 6.8 2020    HGB 12.7 2020    HCT 43.0 2020    MCV 87.8 2020     2020     Lab Results   Component Value Date    ALT 26 2020    AST 20 2020    ALKPHOS 63 2020    BILITOT 0.3 2020     Lab Results   Component Value Date    CKTOTAL 428 (H) 2020    CKMB 16.0 (H) 2020    TROPONINI 0.02 2020    TROPONINI 0.02 2020    TROPONINI 0.03 2020     Lab Results   Component Value Date    INR 1.1 2020    PROTIME 12.1 2020     No results found for: TSHFT4, TSH  Lab Results   Component Value Date    LABA1C 8.9 (H) 2020     No results found for: EAG  No results found for: CHOL  No results found for: TRIG  No results found for: HDL  No results found for: LDLCALC, LDLCHOLESTEROL  No results found for: LABVLDL, VLDL  No results found for: CHOLHDLRATIO  No results for input(s): PROBNP in the last 72 hours. Cardiac Tests:  EC2022: Sinus rhythm 76 beats minute.   Normal axis and intervals. Nonspecific T wave changes. CTA chest:   11/23/2020, personally reviewed demonstrating three-vessel coronary calcification      Echocardiogram:   TTE 3/2/22   Summary   Micro-bubble contrast injected to enhance left ventricular visualization. Normal left ventricular size. LV systolic function is mildly reduced. Ejection fraction is visually estimated at 45-50%. Abnormal diastolic function. No regional wall motion abnormalities seen. Normal left ventricular wall thickness. Normal right ventricular size and function. Aortic sclerosis present. 11/24/2020   Summary   Technically suboptimal and limited study. Mildly dilated left ventricle. Micro-bubble contrast injected to enhance left ventricular visualization. No regional wall motion abnormalities seen. Moderately reduced left ventricular systolic dysfunction, EF 35 to 40%   The left atrium is borderline dilated. Moderately dilated right ventricle. Right ventricle global systolic function is reduced . Mildly enlarged right atrium size. Mild mitral annular calcification. Mild mitral regurgitation is present. The aortic valve appears moderately sclerotic. Mild tricuspid regurgitation. Pulmonary hypertension is mild . Dilation of the ascending aorta. Stress test:       NM Stress (11/27/2020)  Impression:   1. No reversible defects to suggest ischemia. 2. Dilated left ventricle, with global hypokinesis and EF of 46%. 3. Intermediate risk study. Orders Placed This Encounter   Procedures    EKG 12 lead        Requested Prescriptions      No prescriptions requested or ordered in this encounter        ASSESSMENT / PLAN:  Chronic heart failure with reduced ejection fraction, euvolemic  Cardiomyopathy, EF 35-40% -> 45-50%. Likely ischemic in etiology, no active ischemia on stress imaging, no anginal symptoms.   CAD with three-vessel coronary calcification noted on chest CT  Obesity BMI 35 kg/m²  Chronic hypoxic respiratory failure, on continuous O2  Poorly controlled diabetes  History of hypertension. Now hypotensive and asymptomatic  JACEK  Dementia, progressive  Pulmonary nodules, negative for malignancy    Recommendations:  He looks good and continues to do well from a cardiac standpoint. EF is improved. I am however concerned about his low blood pressure though he is asymptomatic at present. Discontinue spironolactone due to hypotension  Continue guideline directed medical therapy otherwise with metoprolol and lisinopril  Continue daily bumetanide, 0.5 mg but if continues to be hypotensive would decrease to alternate day dosing  Notify me if blood pressure still running low  Explained to wife she does not need to be too strict with a fluid restriction, best to focus on low-sodium diet and allow him to stay well hydrated as this will also help his blood pressure  Continue aspirin and lovastatin  Has upcoming appointment with Dr. Jabari Kwok  Aggressive risk factor modification  Increase physical activity as able  Follow-up in 6 months or sooner if need arises    Discussed with patient and wife    The patient's current medication list, allergies, problem list and results of all previously ordered testing were reviewed at today's visit.     Fran Aaron MD, [address] [place] [address] Cardiology

## 2022-10-11 RX ORDER — LISINOPRIL 10 MG/1
10 TABLET ORAL DAILY
Qty: 30 TABLET | Refills: 5 | Status: SHIPPED | OUTPATIENT
Start: 2022-10-11

## 2022-10-31 DIAGNOSIS — I50.20 HFREF (HEART FAILURE WITH REDUCED EJECTION FRACTION) (HCC): ICD-10-CM

## 2022-10-31 RX ORDER — METOPROLOL SUCCINATE 50 MG/1
50 TABLET, EXTENDED RELEASE ORAL DAILY
Qty: 30 TABLET | Refills: 5 | Status: SHIPPED | OUTPATIENT
Start: 2022-10-31

## 2023-04-20 RX ORDER — LISINOPRIL 10 MG/1
10 TABLET ORAL DAILY
Qty: 90 TABLET | Refills: 3 | Status: SHIPPED | OUTPATIENT
Start: 2023-04-20

## 2023-05-04 DIAGNOSIS — I50.20 HFREF (HEART FAILURE WITH REDUCED EJECTION FRACTION) (HCC): ICD-10-CM

## 2023-05-04 RX ORDER — METOPROLOL SUCCINATE 50 MG/1
50 TABLET, EXTENDED RELEASE ORAL DAILY
Qty: 30 TABLET | Refills: 5 | Status: SHIPPED | OUTPATIENT
Start: 2023-05-04

## 2023-07-05 RX ORDER — LISINOPRIL 10 MG/1
10 TABLET ORAL DAILY
Qty: 90 TABLET | Refills: 3 | Status: SHIPPED | OUTPATIENT
Start: 2023-07-05

## 2023-11-14 DIAGNOSIS — I50.20 HFREF (HEART FAILURE WITH REDUCED EJECTION FRACTION) (HCC): ICD-10-CM

## 2023-11-14 RX ORDER — METOPROLOL SUCCINATE 50 MG/1
50 TABLET, EXTENDED RELEASE ORAL DAILY
Qty: 30 TABLET | Refills: 3 | Status: SHIPPED | OUTPATIENT
Start: 2023-11-14

## 2024-03-20 DIAGNOSIS — I50.20 HFREF (HEART FAILURE WITH REDUCED EJECTION FRACTION) (HCC): ICD-10-CM

## 2024-03-20 RX ORDER — METOPROLOL SUCCINATE 50 MG/1
50 TABLET, EXTENDED RELEASE ORAL DAILY
Qty: 30 TABLET | Refills: 5 | Status: SHIPPED | OUTPATIENT
Start: 2024-03-20

## 2024-04-10 ENCOUNTER — OFFICE VISIT (OUTPATIENT)
Dept: CARDIOLOGY CLINIC | Age: 79
End: 2024-04-10
Payer: MEDICARE

## 2024-04-10 VITALS
HEIGHT: 71 IN | WEIGHT: 227 LBS | BODY MASS INDEX: 31.78 KG/M2 | HEART RATE: 100 BPM | SYSTOLIC BLOOD PRESSURE: 114 MMHG | DIASTOLIC BLOOD PRESSURE: 80 MMHG | RESPIRATION RATE: 18 BRPM

## 2024-04-10 DIAGNOSIS — I42.9 CARDIOMYOPATHY, UNSPECIFIED TYPE (HCC): ICD-10-CM

## 2024-04-10 DIAGNOSIS — I48.92 ATRIAL FLUTTER, UNSPECIFIED TYPE (HCC): Primary | ICD-10-CM

## 2024-04-10 DIAGNOSIS — I50.20 HFREF (HEART FAILURE WITH REDUCED EJECTION FRACTION) (HCC): ICD-10-CM

## 2024-04-10 DIAGNOSIS — I25.10 CORONARY ARTERY DISEASE INVOLVING NATIVE CORONARY ARTERY OF NATIVE HEART WITHOUT ANGINA PECTORIS: ICD-10-CM

## 2024-04-10 PROCEDURE — 99215 OFFICE O/P EST HI 40 MIN: CPT | Performed by: INTERNAL MEDICINE

## 2024-04-10 PROCEDURE — 93000 ELECTROCARDIOGRAM COMPLETE: CPT | Performed by: INTERNAL MEDICINE

## 2024-04-10 PROCEDURE — G8427 DOCREV CUR MEDS BY ELIG CLIN: HCPCS | Performed by: INTERNAL MEDICINE

## 2024-04-10 PROCEDURE — 1123F ACP DISCUSS/DSCN MKR DOCD: CPT | Performed by: INTERNAL MEDICINE

## 2024-04-10 PROCEDURE — 1036F TOBACCO NON-USER: CPT | Performed by: INTERNAL MEDICINE

## 2024-04-10 PROCEDURE — G8419 CALC BMI OUT NRM PARAM NOF/U: HCPCS | Performed by: INTERNAL MEDICINE

## 2024-04-10 NOTE — PROGRESS NOTES
Patient was seen today and a 7 day monitor was placed. Monitor was ordered by Dr. Parks The monitor was applied, instructions were given to the patient. Patient stated understanding and gave verbalize readback.   Monitor company: [employer]  Serial number: [id number]

## 2024-04-10 NOTE — PROGRESS NOTES
OUTPATIENT CARDIOLOGY FOLLOW-UP    Name: Enmanuel Feliz    Age: 78 y.o.    Primary Care Physician: Bruce Lobo MD    Date of Service: 4/10/2024    Chief Complaint:   Chief Complaint   Patient presents with    Annual Exam        Interim History:   Here for follow-up of cardiomyopathy EF 35 to 40%, presumed ischemic, diabetes.  Repeat echo 3/2022 showed improvement of EF on GDMT 45-50%.    He presents today accompanied by his wife.  Continues to do well.  Denies any complaints.  No chest pain, shortness of breath, dizziness lightheadedness or syncope.  No lower extremity edema.  Wears oxygen at home.  Has dementia and impulsive behavior, she has to keep the kitchen locked to bring him from eating continuously.      Today noted to be in atrial flutter which is a new diagnosis.  He is unaware of the arrhythmia.  Wife also surprised that she thought he was doing very well.    Review of Systems:   Negative except as described above    Past Medical History:  Past Medical History:   Diagnosis Date    CAD (coronary artery disease)     Cardiomyopathy (HCC)     CHF (congestive heart failure) (HCC)     COPD (chronic obstructive pulmonary disease) (HCC)     COPD (chronic obstructive pulmonary disease) (HCC)     Dementia (HCC)     Diabetes mellitus (HCC)     Hypertension        Past Surgical History:  Past Surgical History:   Procedure Laterality Date    TOE AMPUTATION         Family History:  Family History   Problem Relation Age of Onset    Heart Attack Mother     Diabetes Mother     Diabetes Father        Social History:  Social History     Tobacco Use    Smoking status: Former     Current packs/day: 0.00     Average packs/day: 3.0 packs/day for 10.0 years (30.0 ttl pk-yrs)     Types: Cigarettes     Start date:      Quit date: 1980     Years since quittin.3    Smokeless tobacco: Never   Vaping Use    Vaping Use: Never used   Substance Use Topics    Alcohol use: Not Currently     Comment: hx heavy drinking per

## 2024-05-03 ENCOUNTER — TELEPHONE (OUTPATIENT)
Dept: CARDIOLOGY CLINIC | Age: 79
End: 2024-05-03

## 2024-05-03 DIAGNOSIS — I48.92 ATRIAL FLUTTER, UNSPECIFIED TYPE (HCC): ICD-10-CM

## 2024-05-03 NOTE — TELEPHONE ENCOUNTER
----- Message from Seamus Fitzgerald MD sent at 5/2/2024  6:41 PM EDT -----  Monitor showed continuous atrial flutter with an average heart rate in the 80s so overall his heart rates are well-controlled.    As we discussed in the office will continue with a rate control and anticoagulation strategy.  Will await results of echocardiogram and follow-up as scheduled.  Continue same medications for now.

## 2024-05-03 NOTE — TELEPHONE ENCOUNTER
Patient's wife Isa notified of monitor results and will have patient follow up as scheduled per Dr. Fitzgerald's recommendations.

## 2024-06-26 ENCOUNTER — HOSPITAL ENCOUNTER (OUTPATIENT)
Dept: CARDIOLOGY | Age: 79
Discharge: HOME | End: 2024-06-28
Attending: INTERNAL MEDICINE
Payer: MEDICARE

## 2024-06-26 VITALS
SYSTOLIC BLOOD PRESSURE: 114 MMHG | HEIGHT: 71 IN | WEIGHT: 227 LBS | DIASTOLIC BLOOD PRESSURE: 80 MMHG | BODY MASS INDEX: 31.78 KG/M2

## 2024-06-26 DIAGNOSIS — I48.92 ATRIAL FLUTTER, UNSPECIFIED TYPE (HCC): ICD-10-CM

## 2024-06-26 DIAGNOSIS — I50.20 HFREF (HEART FAILURE WITH REDUCED EJECTION FRACTION) (HCC): ICD-10-CM

## 2024-06-26 PROCEDURE — 93306 TTE W/DOPPLER COMPLETE: CPT

## 2024-06-26 PROCEDURE — 93306 TTE W/DOPPLER COMPLETE: CPT | Performed by: INTERNAL MEDICINE

## 2024-06-27 LAB
ECHO AO ASC DIAM: 2.7 CM
ECHO AO ASCENDING AORTA INDEX: 1.21 CM/M2
ECHO AV AREA PEAK VELOCITY: 2.1 CM2
ECHO AV AREA VTI: 1.7 CM2
ECHO AV AREA/BSA PEAK VELOCITY: 0.9 CM2/M2
ECHO AV AREA/BSA VTI: 0.8 CM2/M2
ECHO AV CUSP MM: 1 CM
ECHO AV MEAN GRADIENT: 5 MMHG
ECHO AV MEAN VELOCITY: 1.1 M/S
ECHO AV PEAK GRADIENT: 10 MMHG
ECHO AV PEAK VELOCITY: 1.6 M/S
ECHO AV VELOCITY RATIO: 0.5
ECHO AV VTI: 27.2 CM
ECHO BSA: 2.27 M2
ECHO EST RA PRESSURE: 3 MMHG
ECHO LA DIAMETER INDEX: 2.11 CM/M2
ECHO LA DIAMETER: 4.7 CM
ECHO LA VOL A-L A2C: 40 ML (ref 18–58)
ECHO LA VOL A-L A4C: 48 ML (ref 18–58)
ECHO LA VOL MOD A2C: 40 ML (ref 18–58)
ECHO LA VOL MOD A4C: 45 ML (ref 18–58)
ECHO LA VOLUME AREA LENGTH: 45 ML
ECHO LA VOLUME INDEX A-L A2C: 18 ML/M2 (ref 16–34)
ECHO LA VOLUME INDEX A-L A4C: 22 ML/M2 (ref 16–34)
ECHO LA VOLUME INDEX AREA LENGTH: 20 ML/M2 (ref 16–34)
ECHO LA VOLUME INDEX MOD A2C: 18 ML/M2 (ref 16–34)
ECHO LA VOLUME INDEX MOD A4C: 20 ML/M2 (ref 16–34)
ECHO LV EDV A2C: 103 ML
ECHO LV EDV A4C: 124 ML
ECHO LV EDV BP: 113 ML (ref 67–155)
ECHO LV EDV INDEX A4C: 56 ML/M2
ECHO LV EDV INDEX BP: 51 ML/M2
ECHO LV EDV NDEX A2C: 46 ML/M2
ECHO LV EF PHYSICIAN: 35 %
ECHO LV EJECTION FRACTION A2C: 38 %
ECHO LV EJECTION FRACTION A4C: 37 %
ECHO LV EJECTION FRACTION BIPLANE: 38 % (ref 55–100)
ECHO LV ESV A2C: 64 ML
ECHO LV ESV A4C: 78 ML
ECHO LV ESV BP: 70 ML (ref 22–58)
ECHO LV ESV INDEX A2C: 29 ML/M2
ECHO LV ESV INDEX A4C: 35 ML/M2
ECHO LV ESV INDEX BP: 31 ML/M2
ECHO LV FRACTIONAL SHORTENING: 19 % (ref 28–44)
ECHO LV INTERNAL DIMENSION DIASTOLE INDEX: 3 CM/M2
ECHO LV INTERNAL DIMENSION DIASTOLIC: 6.7 CM (ref 4.2–5.9)
ECHO LV INTERNAL DIMENSION SYSTOLIC INDEX: 2.42 CM/M2
ECHO LV INTERNAL DIMENSION SYSTOLIC: 5.4 CM
ECHO LV ISOVOLUMETRIC RELAXATION TIME (IVRT): 73.8 MS
ECHO LV IVSD: 0.7 CM (ref 0.6–1)
ECHO LV IVSS: 0.9 CM
ECHO LV MASS 2D: 192.5 G (ref 88–224)
ECHO LV MASS INDEX 2D: 86.3 G/M2 (ref 49–115)
ECHO LV POSTERIOR WALL DIASTOLIC: 0.7 CM (ref 0.6–1)
ECHO LV POSTERIOR WALL SYSTOLIC: 1.2 CM
ECHO LV RELATIVE WALL THICKNESS RATIO: 0.21
ECHO LVOT AREA: 3.8 CM2
ECHO LVOT AV VTI INDEX: 0.44
ECHO LVOT DIAM: 2.2 CM
ECHO LVOT MEAN GRADIENT: 2 MMHG
ECHO LVOT PEAK GRADIENT: 3 MMHG
ECHO LVOT PEAK VELOCITY: 0.8 M/S
ECHO LVOT STROKE VOLUME INDEX: 20.3 ML/M2
ECHO LVOT SV: 45.2 ML
ECHO LVOT VTI: 11.9 CM
ECHO MV AREA PHT: 3.1 CM2
ECHO MV AREA VTI: 2.2 CM2
ECHO MV E DECELERATION TIME (DT): 185.7 MS
ECHO MV EROA PISA: 0.2 CM2
ECHO MV LVOT VTI INDEX: 1.76
ECHO MV MAX VELOCITY: 1 M/S
ECHO MV MEAN GRADIENT: 1 MMHG
ECHO MV MEAN VELOCITY: 0.5 M/S
ECHO MV PEAK GRADIENT: 4 MMHG
ECHO MV PRESSURE HALF TIME (PHT): 72.1 MS
ECHO MV REGURGITANT ALIASING (NYQUIST) VELOCITY: 31 CM/S
ECHO MV REGURGITANT RADIUS PISA: 0.6 CM
ECHO MV REGURGITANT VELOCITY PISA: 4.3 M/S
ECHO MV REGURGITANT VOLUME PISA: 20.91 ML
ECHO MV REGURGITANT VTIA: 128.3 CM
ECHO MV VTI: 21 CM
ECHO PULMONARY ARTERY END DIASTOLIC PRESSURE: 14 MMHG
ECHO PV MAX VELOCITY: 0.6 M/S
ECHO PV MEAN GRADIENT: 1 MMHG
ECHO PV MEAN VELOCITY: 0.4 M/S
ECHO PV PEAK GRADIENT: 2 MMHG
ECHO PV REGURGITANT MAX VELOCITY: 1.9 M/S
ECHO PV VTI: 10 CM
ECHO PVEIN PEAK D VELOCITY: 0.7 M/S
ECHO PVEIN PEAK S VELOCITY: 0.3 M/S
ECHO PVEIN S/D RATIO: 0.4
ECHO RIGHT VENTRICULAR SYSTOLIC PRESSURE (RVSP): 49 MMHG
ECHO TV REGURGITANT MAX VELOCITY: 3.38 M/S
ECHO TV REGURGITANT PEAK GRADIENT: 46 MMHG

## 2024-07-03 ENCOUNTER — TELEPHONE (OUTPATIENT)
Dept: CARDIOLOGY CLINIC | Age: 79
End: 2024-07-03

## 2024-07-03 NOTE — TELEPHONE ENCOUNTER
Patient's wife Isa notified of Echo results and reduced EF per Dr. Fitzgerald. Isa notified to ncrease metoprolol succinate to 75 mg daily. Add empagliflozin 10 mg daily given reduced ejection fraction. Notify office if any SOB or swelling per Dr. Fitzgerald.  Chart reflects the changes script E-scribed.

## 2024-07-03 NOTE — TELEPHONE ENCOUNTER
----- Message from Seamus Fitzgerald MD sent at 7/2/2024  7:25 PM EDT -----  Echo showed heart pumping function is reduced again around 35 to 40%.    Recommend increase metoprolol succinate to 75 mg daily.  Add empagliflozin 10 mg daily given reduced ejection fraction.    Watch for fluid retention/shortness of breath.  Notify if any issues.  Follow-up as scheduled.

## 2024-07-11 ENCOUNTER — OFFICE VISIT (OUTPATIENT)
Dept: CARDIOLOGY CLINIC | Age: 79
End: 2024-07-11
Payer: MEDICARE

## 2024-07-11 VITALS
HEART RATE: 98 BPM | RESPIRATION RATE: 18 BRPM | HEIGHT: 71 IN | WEIGHT: 241.6 LBS | BODY MASS INDEX: 33.82 KG/M2 | SYSTOLIC BLOOD PRESSURE: 106 MMHG | DIASTOLIC BLOOD PRESSURE: 74 MMHG

## 2024-07-11 DIAGNOSIS — I42.9 CARDIOMYOPATHY, UNSPECIFIED TYPE (HCC): ICD-10-CM

## 2024-07-11 DIAGNOSIS — I50.42 CHRONIC COMBINED SYSTOLIC AND DIASTOLIC CONGESTIVE HEART FAILURE (HCC): ICD-10-CM

## 2024-07-11 DIAGNOSIS — I48.92 ATRIAL FLUTTER, UNSPECIFIED TYPE (HCC): Primary | ICD-10-CM

## 2024-07-11 DIAGNOSIS — I25.10 CORONARY ARTERY DISEASE INVOLVING NATIVE CORONARY ARTERY OF NATIVE HEART WITHOUT ANGINA PECTORIS: ICD-10-CM

## 2024-07-11 PROCEDURE — G8417 CALC BMI ABV UP PARAM F/U: HCPCS | Performed by: INTERNAL MEDICINE

## 2024-07-11 PROCEDURE — 1036F TOBACCO NON-USER: CPT | Performed by: INTERNAL MEDICINE

## 2024-07-11 PROCEDURE — 99214 OFFICE O/P EST MOD 30 MIN: CPT | Performed by: INTERNAL MEDICINE

## 2024-07-11 PROCEDURE — G8427 DOCREV CUR MEDS BY ELIG CLIN: HCPCS | Performed by: INTERNAL MEDICINE

## 2024-07-11 PROCEDURE — 1123F ACP DISCUSS/DSCN MKR DOCD: CPT | Performed by: INTERNAL MEDICINE

## 2024-07-11 PROCEDURE — 93000 ELECTROCARDIOGRAM COMPLETE: CPT | Performed by: INTERNAL MEDICINE

## 2024-07-11 RX ORDER — SPIRONOLACTONE 25 MG/1
25 TABLET ORAL DAILY
COMMUNITY

## 2024-07-11 RX ORDER — LISINOPRIL 10 MG/1
10 TABLET ORAL DAILY
Qty: 30 TABLET | Refills: 3 | Status: SHIPPED | OUTPATIENT
Start: 2024-07-11

## 2024-07-11 NOTE — PROGRESS NOTES
OUTPATIENT CARDIOLOGY FOLLOW-UP    Name: Enmanuel Feliz    Age: 78 y.o.    Primary Care Physician: Bruce Lobo MD    Date of Service: 2024    Chief Complaint:   Chief Complaint   Patient presents with    Atrial Flutter     3 month office visit        Interim History:   Here for follow-up of cardiomyopathy EF 35 to 40%, presumed ischemic, diabetes.  Repeat echo 3/2022 showed improvement of EF on GDMT 45-50%.    He presents accompanied by his wife.  Continues to do well.  Denies any complaints.  No chest pain, shortness of breath, dizziness lightheadedness or syncope.  No lower extremity edema.  Wears oxygen at home.  Has dementia and impulsive behavior, she has to keep the kitchen locked to keep him from eating continuously.      Last visit found to be in atrial flutter of new onset.  Asymptomatic.  Echo showed his EF had decreased again to 35-40%.    Review of Systems:   Negative except as described above    Past Medical History:  Past Medical History:   Diagnosis Date    CAD (coronary artery disease)     Cardiomyopathy (HCC)     CHF (congestive heart failure) (HCC)     COPD (chronic obstructive pulmonary disease) (HCC)     COPD (chronic obstructive pulmonary disease) (HCC)     Dementia (HCC)     Diabetes mellitus (HCC)     Hypertension        Past Surgical History:  Past Surgical History:   Procedure Laterality Date    TOE AMPUTATION         Family History:  Family History   Problem Relation Age of Onset    Heart Attack Mother     Diabetes Mother     Diabetes Father        Social History:  Social History     Tobacco Use    Smoking status: Former     Current packs/day: 0.00     Average packs/day: 3.0 packs/day for 10.0 years (30.0 ttl pk-yrs)     Types: Cigarettes     Start date:      Quit date:      Years since quittin.5    Smokeless tobacco: Never   Vaping Use    Vaping Use: Never used   Substance Use Topics    Alcohol use: Yes     Comment: hx heavy drinking per wife / quit 17 yrs ago

## 2024-07-12 ENCOUNTER — TELEPHONE (OUTPATIENT)
Dept: CARDIOLOGY CLINIC | Age: 79
End: 2024-07-12

## 2024-07-12 NOTE — TELEPHONE ENCOUNTER
Conjuntivae and eyelids appear normal, Sclerae : White without injection Note faxed to Dr. Lobo [phone] to request labs.

## 2024-07-12 NOTE — TELEPHONE ENCOUNTER
----- Message from Seamus Fitzgerald MD sent at 7/11/2024  4:47 PM EDT -----  Please request recent blood work from primary care

## 2024-08-06 DIAGNOSIS — I50.20 HFREF (HEART FAILURE WITH REDUCED EJECTION FRACTION) (HCC): ICD-10-CM

## 2024-08-06 RX ORDER — METOPROLOL SUCCINATE 50 MG/1
75 TABLET, EXTENDED RELEASE ORAL DAILY
Qty: 45 TABLET | Refills: 5 | Status: SHIPPED | OUTPATIENT
Start: 2024-08-06

## 2024-08-22 NOTE — PROGRESS NOTES
Date: 11/25/2020    Time: 9:41 PM    Patient Placed On BIPAP/CPAP/ Non-Invasive Ventilation? No    If no must comment. Facial area red/color change? No           If YES are Blister/Lesion present? No   If yes must notify nursing staff  BIPAP/CPAP skin barrier? No    Skin barrier type:n/a       Comments: pt refuses bipap. Machine in room on standby if needed.          Lucille Akers Please call & inform patient:  1.  Urinalysis shows a few skin cells otherwise no evidence of infection  2.  Electrolytes and renal function are normal  3.  A1c slightly up overall stable over the last 2 years, at goal, no changes  4.  Urine microalbumin creatinine ratio was normal  5.  Celiac screen negative this makes celiac disease unlikely  Continue with abdominal ultrasound as planned, workup thus far reassuring  Thank you,  DIMITRIS Putnam

## 2024-11-11 RX ORDER — LISINOPRIL 10 MG/1
10 TABLET ORAL DAILY
Qty: 90 TABLET | Refills: 3 | Status: SHIPPED | OUTPATIENT
Start: 2024-11-11

## 2025-01-08 ENCOUNTER — OFFICE VISIT (OUTPATIENT)
Dept: CARDIOLOGY CLINIC | Age: 80
End: 2025-01-08
Payer: MEDICARE

## 2025-01-08 VITALS
TEMPERATURE: 97.7 F | RESPIRATION RATE: 18 BRPM | DIASTOLIC BLOOD PRESSURE: 62 MMHG | BODY MASS INDEX: 31.86 KG/M2 | HEIGHT: 71 IN | WEIGHT: 227.6 LBS | OXYGEN SATURATION: 90 % | SYSTOLIC BLOOD PRESSURE: 106 MMHG | HEART RATE: 64 BPM

## 2025-01-08 DIAGNOSIS — I25.10 CORONARY ARTERY DISEASE INVOLVING NATIVE CORONARY ARTERY OF NATIVE HEART WITHOUT ANGINA PECTORIS: ICD-10-CM

## 2025-01-08 DIAGNOSIS — I48.92 ATRIAL FLUTTER, UNSPECIFIED TYPE (HCC): Primary | ICD-10-CM

## 2025-01-08 DIAGNOSIS — I42.9 CARDIOMYOPATHY, UNSPECIFIED TYPE (HCC): ICD-10-CM

## 2025-01-08 DIAGNOSIS — I50.42 CHRONIC COMBINED SYSTOLIC AND DIASTOLIC CONGESTIVE HEART FAILURE (HCC): ICD-10-CM

## 2025-01-08 PROCEDURE — M1308 PR FLU IMMUNIZE NO ADMIN: HCPCS | Performed by: INTERNAL MEDICINE

## 2025-01-08 PROCEDURE — 1159F MED LIST DOCD IN RCRD: CPT | Performed by: INTERNAL MEDICINE

## 2025-01-08 PROCEDURE — 99214 OFFICE O/P EST MOD 30 MIN: CPT | Performed by: INTERNAL MEDICINE

## 2025-01-08 PROCEDURE — G8427 DOCREV CUR MEDS BY ELIG CLIN: HCPCS | Performed by: INTERNAL MEDICINE

## 2025-01-08 PROCEDURE — 1123F ACP DISCUSS/DSCN MKR DOCD: CPT | Performed by: INTERNAL MEDICINE

## 2025-01-08 PROCEDURE — G8417 CALC BMI ABV UP PARAM F/U: HCPCS | Performed by: INTERNAL MEDICINE

## 2025-01-08 PROCEDURE — 93000 ELECTROCARDIOGRAM COMPLETE: CPT | Performed by: INTERNAL MEDICINE

## 2025-01-08 PROCEDURE — 1036F TOBACCO NON-USER: CPT | Performed by: INTERNAL MEDICINE

## 2025-01-08 NOTE — PROGRESS NOTES
malignancy    Recommendations:  Patient remains asymptomatic and well compensated from cardiac standpoint with persistent atrial flutter.  I am not sure that the fecal incontinence had anything to do with either the empagliflozin or the apixaban but improved with reducing the dose.    Discontinue empagliflozin and for now  Continue apixaban recommend full dose 5 mg twice daily  Continue rate control strategy with metoprolol  Monitor for heart failure symptoms   Continue metoprolol succinate 75 mg daily  Continue guideline directed medical therapy for cardiomyopathy with metoprolol and lisinopril  Spironolactone previously added by primary care  Closely monitor renal function and electrolytes, has upcoming visit with primary care and labs will be checked  Watch blood pressure, running a bit low  Continue daily bumetanide, 0.5 mg   No aspirin given initiation of DOAC  Continue lovastatin  Follow-up with primary care if persistent incontinence issues  Aggressive risk factor modification  Increase physical activity as able  Follow-up in 6 months or sooner if need arises    Discussed at length with patient and wife    The patient's current medication list, allergies, problem list and results of all previously ordered testing were reviewed at today's visit.    Behzad Fitzgerald MD, [facility]   1.7

## 2025-02-13 DIAGNOSIS — I50.20 HFREF (HEART FAILURE WITH REDUCED EJECTION FRACTION) (HCC): ICD-10-CM

## 2025-02-13 RX ORDER — METOPROLOL SUCCINATE 50 MG/1
75 TABLET, EXTENDED RELEASE ORAL DAILY
Qty: 45 TABLET | Refills: 5 | Status: SHIPPED | OUTPATIENT
Start: 2025-02-13

## 2025-09-02 ENCOUNTER — OFFICE VISIT (OUTPATIENT)
Dept: CARDIOLOGY CLINIC | Age: 80
End: 2025-09-02
Payer: MEDICARE

## 2025-09-02 VITALS
BODY MASS INDEX: 33.1 KG/M2 | RESPIRATION RATE: 18 BRPM | OXYGEN SATURATION: 94 % | WEIGHT: 236.4 LBS | DIASTOLIC BLOOD PRESSURE: 80 MMHG | HEART RATE: 121 BPM | HEIGHT: 71 IN | SYSTOLIC BLOOD PRESSURE: 120 MMHG | TEMPERATURE: 97 F

## 2025-09-02 DIAGNOSIS — I48.92 ATRIAL FLUTTER, UNSPECIFIED TYPE (HCC): Primary | ICD-10-CM

## 2025-09-02 DIAGNOSIS — I42.9 CARDIOMYOPATHY, UNSPECIFIED TYPE (HCC): ICD-10-CM

## 2025-09-02 DIAGNOSIS — I50.42 CHRONIC COMBINED SYSTOLIC AND DIASTOLIC CONGESTIVE HEART FAILURE (HCC): ICD-10-CM

## 2025-09-02 PROCEDURE — 99214 OFFICE O/P EST MOD 30 MIN: CPT | Performed by: INTERNAL MEDICINE

## 2025-09-02 PROCEDURE — G8417 CALC BMI ABV UP PARAM F/U: HCPCS | Performed by: INTERNAL MEDICINE

## 2025-09-02 PROCEDURE — 1036F TOBACCO NON-USER: CPT | Performed by: INTERNAL MEDICINE

## 2025-09-02 PROCEDURE — 1123F ACP DISCUSS/DSCN MKR DOCD: CPT | Performed by: INTERNAL MEDICINE

## 2025-09-02 PROCEDURE — 93000 ELECTROCARDIOGRAM COMPLETE: CPT | Performed by: INTERNAL MEDICINE

## 2025-09-02 PROCEDURE — 1159F MED LIST DOCD IN RCRD: CPT | Performed by: INTERNAL MEDICINE

## 2025-09-02 PROCEDURE — G8427 DOCREV CUR MEDS BY ELIG CLIN: HCPCS | Performed by: INTERNAL MEDICINE
